# Patient Record
Sex: FEMALE | Race: BLACK OR AFRICAN AMERICAN | NOT HISPANIC OR LATINO | Employment: OTHER | URBAN - METROPOLITAN AREA
[De-identification: names, ages, dates, MRNs, and addresses within clinical notes are randomized per-mention and may not be internally consistent; named-entity substitution may affect disease eponyms.]

---

## 2023-02-22 LAB
ALANINE AMINOTRANSFERASE (SGPT) (U/L) IN SER/PLAS: 16 U/L (ref 7–45)
ALBUMIN (G/DL) IN SER/PLAS: 4.3 G/DL (ref 3.4–5)
ALKALINE PHOSPHATASE (U/L) IN SER/PLAS: 112 U/L (ref 33–136)
ANION GAP IN SER/PLAS: 13 MMOL/L (ref 10–20)
ASPARTATE AMINOTRANSFERASE (SGOT) (U/L) IN SER/PLAS: 18 U/L (ref 9–39)
BASOPHILS (10*3/UL) IN BLOOD BY AUTOMATED COUNT: 0.04 X10E9/L (ref 0–0.1)
BASOPHILS/100 LEUKOCYTES IN BLOOD BY AUTOMATED COUNT: 0.6 % (ref 0–2)
BILIRUBIN TOTAL (MG/DL) IN SER/PLAS: 0.3 MG/DL (ref 0–1.2)
CALCIUM (MG/DL) IN SER/PLAS: 9.6 MG/DL (ref 8.6–10.6)
CARBON DIOXIDE, TOTAL (MMOL/L) IN SER/PLAS: 27 MMOL/L (ref 21–32)
CHLORIDE (MMOL/L) IN SER/PLAS: 107 MMOL/L (ref 98–107)
CHOLESTEROL (MG/DL) IN SER/PLAS: 150 MG/DL (ref 0–199)
CHOLESTEROL IN HDL (MG/DL) IN SER/PLAS: 53 MG/DL
CHOLESTEROL/HDL RATIO: 2.8
CREATININE (MG/DL) IN SER/PLAS: 0.72 MG/DL (ref 0.5–1.05)
EOSINOPHILS (10*3/UL) IN BLOOD BY AUTOMATED COUNT: 0.26 X10E9/L (ref 0–0.7)
EOSINOPHILS/100 LEUKOCYTES IN BLOOD BY AUTOMATED COUNT: 4.1 % (ref 0–6)
ERYTHROCYTE DISTRIBUTION WIDTH (RATIO) BY AUTOMATED COUNT: 14.1 % (ref 11.5–14.5)
ERYTHROCYTE MEAN CORPUSCULAR HEMOGLOBIN CONCENTRATION (G/DL) BY AUTOMATED: 29.9 G/DL (ref 32–36)
ERYTHROCYTE MEAN CORPUSCULAR VOLUME (FL) BY AUTOMATED COUNT: 93 FL (ref 80–100)
ERYTHROCYTES (10*6/UL) IN BLOOD BY AUTOMATED COUNT: 4.32 X10E12/L (ref 4–5.2)
ESTIMATED AVERAGE GLUCOSE FOR HBA1C: 131 MG/DL
GFR FEMALE: >90 ML/MIN/1.73M2
GLUCOSE (MG/DL) IN SER/PLAS: 108 MG/DL (ref 74–99)
HEMATOCRIT (%) IN BLOOD BY AUTOMATED COUNT: 40.1 % (ref 36–46)
HEMOGLOBIN (G/DL) IN BLOOD: 12 G/DL (ref 12–16)
HEMOGLOBIN A1C/HEMOGLOBIN TOTAL IN BLOOD: 6.2 %
IMMATURE GRANULOCYTES/100 LEUKOCYTES IN BLOOD BY AUTOMATED COUNT: 0.2 % (ref 0–0.9)
LDL: 75 MG/DL (ref 0–99)
LEUKOCYTES (10*3/UL) IN BLOOD BY AUTOMATED COUNT: 6.3 X10E9/L (ref 4.4–11.3)
LYMPHOCYTES (10*3/UL) IN BLOOD BY AUTOMATED COUNT: 2.31 X10E9/L (ref 1.2–4.8)
LYMPHOCYTES/100 LEUKOCYTES IN BLOOD BY AUTOMATED COUNT: 36.6 % (ref 13–44)
MONOCYTES (10*3/UL) IN BLOOD BY AUTOMATED COUNT: 0.26 X10E9/L (ref 0.1–1)
MONOCYTES/100 LEUKOCYTES IN BLOOD BY AUTOMATED COUNT: 4.1 % (ref 2–10)
NEUTROPHILS (10*3/UL) IN BLOOD BY AUTOMATED COUNT: 3.43 X10E9/L (ref 1.2–7.7)
NEUTROPHILS/100 LEUKOCYTES IN BLOOD BY AUTOMATED COUNT: 54.4 % (ref 40–80)
NRBC (PER 100 WBCS) BY AUTOMATED COUNT: 0 /100 WBC (ref 0–0)
PLATELETS (10*3/UL) IN BLOOD AUTOMATED COUNT: 352 X10E9/L (ref 150–450)
POTASSIUM (MMOL/L) IN SER/PLAS: 4.4 MMOL/L (ref 3.5–5.3)
PROTEIN TOTAL: 6.9 G/DL (ref 6.4–8.2)
SODIUM (MMOL/L) IN SER/PLAS: 143 MMOL/L (ref 136–145)
THYROTROPIN (MIU/L) IN SER/PLAS BY DETECTION LIMIT <= 0.05 MIU/L: 1.41 MIU/L (ref 0.44–3.98)
TRIGLYCERIDE (MG/DL) IN SER/PLAS: 108 MG/DL (ref 0–149)
UREA NITROGEN (MG/DL) IN SER/PLAS: 13 MG/DL (ref 6–23)
VLDL: 22 MG/DL (ref 0–40)

## 2023-03-13 ENCOUNTER — TELEPHONE (OUTPATIENT)
Dept: PRIMARY CARE | Facility: CLINIC | Age: 68
End: 2023-03-13
Payer: MEDICARE

## 2023-03-13 DIAGNOSIS — R32 URINARY INCONTINENCE, UNSPECIFIED TYPE: Primary | ICD-10-CM

## 2023-03-13 RX ORDER — TROSPIUM CHLORIDE ER 60 MG/1
60 CAPSULE ORAL DAILY
Qty: 90 CAPSULE | Refills: 1 | Status: SHIPPED | OUTPATIENT
Start: 2023-03-13 | End: 2024-04-04 | Stop reason: SDUPTHER

## 2023-03-13 NOTE — TELEPHONE ENCOUNTER
----- Message from Aarti Miller sent at 3/13/2023 11:05 AM EDT -----  Regarding: Script & referral  Patient stated that she discussed with you about receiving a refill of  Tropsium at her visit and she would like this sent to [facility]. Patient also requested a referral for Urology because of excessive urination.

## 2023-03-20 ENCOUNTER — TELEPHONE (OUTPATIENT)
Dept: PRIMARY CARE | Facility: CLINIC | Age: 68
End: 2023-03-20

## 2023-03-20 PROBLEM — M25.612 DECREASED RANGE OF MOTION OF LEFT SHOULDER: Status: ACTIVE | Noted: 2023-03-20

## 2023-03-20 PROBLEM — M19.012 ARTHRITIS OF LEFT SHOULDER REGION: Status: ACTIVE | Noted: 2023-03-20

## 2023-03-20 PROBLEM — M43.10 ACQUIRED SPONDYLOLISTHESIS: Status: ACTIVE | Noted: 2023-03-20

## 2023-03-20 PROBLEM — E66.812 CLASS 2 SEVERE OBESITY WITH SERIOUS COMORBIDITY AND BODY MASS INDEX (BMI) OF 38.0 TO 38.9 IN ADULT: Status: ACTIVE | Noted: 2023-03-20

## 2023-03-20 PROBLEM — I50.9 HEART FAILURE (MULTI): Status: ACTIVE | Noted: 2023-03-20

## 2023-03-20 PROBLEM — I25.2 HISTORY OF ST ELEVATION MYOCARDIAL INFARCTION (STEMI): Status: ACTIVE | Noted: 2023-03-20

## 2023-03-20 PROBLEM — R39.9 LOWER URINARY TRACT SYMPTOMS (LUTS): Status: ACTIVE | Noted: 2023-03-20

## 2023-03-20 PROBLEM — M54.12 CERVICAL RADICULOPATHY: Status: ACTIVE | Noted: 2023-03-20

## 2023-03-20 PROBLEM — M25.519 SHOULDER PAIN: Status: ACTIVE | Noted: 2023-03-20

## 2023-03-20 PROBLEM — E83.42 HYPOMAGNESEMIA: Status: ACTIVE | Noted: 2023-03-20

## 2023-03-20 PROBLEM — M65.4 DE QUERVAIN'S TENOSYNOVITIS, RIGHT: Status: ACTIVE | Noted: 2023-03-20

## 2023-03-20 PROBLEM — Z96.652 STATUS POST LEFT KNEE REPLACEMENT: Status: ACTIVE | Noted: 2023-03-20

## 2023-03-20 PROBLEM — I73.9 PAD (PERIPHERAL ARTERY DISEASE) (CMS-HCC): Status: ACTIVE | Noted: 2023-03-20

## 2023-03-20 PROBLEM — M25.532 ARTHRALGIA OF LEFT WRIST: Status: ACTIVE | Noted: 2023-03-20

## 2023-03-20 PROBLEM — R93.1 DECREASED CARDIAC EJECTION FRACTION: Status: ACTIVE | Noted: 2023-03-20

## 2023-03-20 PROBLEM — M54.50 BACK PAIN, LUMBOSACRAL: Status: ACTIVE | Noted: 2023-03-20

## 2023-03-20 PROBLEM — G56.21 CUBITAL TUNNEL SYNDROME, RIGHT: Status: ACTIVE | Noted: 2023-03-20

## 2023-03-20 PROBLEM — M48.061 LUMBAR SPINAL STENOSIS: Status: ACTIVE | Noted: 2023-03-20

## 2023-03-20 PROBLEM — I25.5 ISCHEMIC CARDIOMYOPATHY: Status: ACTIVE | Noted: 2023-03-20

## 2023-03-20 PROBLEM — M54.16 CHRONIC LUMBAR RADICULOPATHY: Status: ACTIVE | Noted: 2023-03-20

## 2023-03-20 PROBLEM — M54.9 BACK PAIN: Status: ACTIVE | Noted: 2023-03-20

## 2023-03-20 PROBLEM — M17.12 OSTEOARTHRITIS OF LEFT KNEE: Status: ACTIVE | Noted: 2023-03-20

## 2023-03-20 PROBLEM — M79.672 LEFT FOOT PAIN: Status: ACTIVE | Noted: 2023-03-20

## 2023-03-20 PROBLEM — I10 ESSENTIAL HYPERTENSION: Status: ACTIVE | Noted: 2023-03-20

## 2023-03-20 PROBLEM — G56.01 RIGHT CARPAL TUNNEL SYNDROME: Status: ACTIVE | Noted: 2023-03-20

## 2023-03-20 PROBLEM — I47.29 VENTRICULAR TACHYCARDIA (PAROXYSMAL) (MULTI): Status: ACTIVE | Noted: 2023-03-20

## 2023-03-20 PROBLEM — R20.2 RIGHT HAND PARESTHESIA: Status: ACTIVE | Noted: 2023-03-20

## 2023-03-20 PROBLEM — M41.9 SCOLIOSIS: Status: ACTIVE | Noted: 2023-03-20

## 2023-03-20 PROBLEM — M17.11 PRIMARY OSTEOARTHRITIS OF RIGHT KNEE: Status: ACTIVE | Noted: 2023-03-20

## 2023-03-20 PROBLEM — S39.012A LOW BACK STRAIN: Status: ACTIVE | Noted: 2023-03-20

## 2023-03-20 PROBLEM — Z95.810 ICD (IMPLANTABLE CARDIOVERTER-DEFIBRILLATOR) IN PLACE: Status: ACTIVE | Noted: 2023-03-20

## 2023-03-20 PROBLEM — M51.369 DEGENERATIVE DISC DISEASE, LUMBAR: Status: ACTIVE | Noted: 2023-03-20

## 2023-03-20 PROBLEM — M19.011 ARTHRITIS OF RIGHT SHOULDER REGION: Status: ACTIVE | Noted: 2023-03-20

## 2023-03-20 PROBLEM — M47.812 CERVICAL SPONDYLOSIS: Status: ACTIVE | Noted: 2023-03-20

## 2023-03-20 PROBLEM — K59.00 CONSTIPATION: Status: ACTIVE | Noted: 2023-03-20

## 2023-03-20 PROBLEM — M51.36 DEGENERATIVE DISC DISEASE, LUMBAR: Status: ACTIVE | Noted: 2023-03-20

## 2023-03-20 PROBLEM — R63.4 WEIGHT LOSS: Status: ACTIVE | Noted: 2023-03-20

## 2023-03-20 PROBLEM — M25.511 RIGHT SHOULDER PAIN: Status: ACTIVE | Noted: 2023-03-20

## 2023-03-20 PROBLEM — E78.5 HYPERLIPIDEMIA: Status: ACTIVE | Noted: 2023-03-20

## 2023-03-20 PROBLEM — M46.1 SACROILIITIS (CMS-HCC): Status: ACTIVE | Noted: 2023-03-20

## 2023-03-20 PROBLEM — M62.81 MUSCLE WEAKNESS: Status: ACTIVE | Noted: 2023-03-20

## 2023-03-20 PROBLEM — M41.9 ACQUIRED SCOLIOSIS: Status: ACTIVE | Noted: 2023-03-20

## 2023-03-20 PROBLEM — E66.01 CLASS 2 SEVERE OBESITY WITH SERIOUS COMORBIDITY AND BODY MASS INDEX (BMI) OF 38.0 TO 38.9 IN ADULT (MULTI): Status: ACTIVE | Noted: 2023-03-20

## 2023-03-20 PROBLEM — K21.9 GERD (GASTROESOPHAGEAL REFLUX DISEASE): Status: ACTIVE | Noted: 2023-03-20

## 2023-03-20 PROBLEM — I47.20 VENTRICULAR TACHYCARDIA (PAROXYSMAL): Status: ACTIVE | Noted: 2023-03-20

## 2023-03-20 PROBLEM — R73.03 PRE-DIABETES: Status: ACTIVE | Noted: 2023-03-20

## 2023-03-20 PROBLEM — M25.512 LEFT SHOULDER PAIN: Status: ACTIVE | Noted: 2023-03-20

## 2023-03-20 PROBLEM — I25.10 CORONARY ARTERY DISEASE INVOLVING NATIVE CORONARY ARTERY OF NATIVE HEART WITHOUT ANGINA PECTORIS: Status: ACTIVE | Noted: 2023-03-20

## 2023-03-20 PROBLEM — M54.50 BILATERAL LOW BACK PAIN: Status: ACTIVE | Noted: 2023-03-20

## 2023-03-20 PROBLEM — M40.05 POSTURAL KYPHOSIS OF THORACOLUMBAR REGION: Status: ACTIVE | Noted: 2023-03-20

## 2023-03-20 RX ORDER — AMLODIPINE BESYLATE 10 MG/1
1 TABLET ORAL DAILY
COMMUNITY
Start: 2017-06-24 | End: 2023-08-11

## 2023-03-20 RX ORDER — GABAPENTIN 600 MG/1
2 TABLET ORAL 3 TIMES DAILY
COMMUNITY
Start: 2019-07-22

## 2023-03-20 RX ORDER — OMEPRAZOLE 20 MG/1
1 CAPSULE, DELAYED RELEASE ORAL DAILY
COMMUNITY
Start: 2015-04-28 | End: 2023-04-04

## 2023-03-20 RX ORDER — FLUTICASONE PROPIONATE 50 MCG
2 SPRAY, SUSPENSION (ML) NASAL DAILY
COMMUNITY
Start: 2017-05-02 | End: 2023-12-18 | Stop reason: SDUPTHER

## 2023-03-20 RX ORDER — CARVEDILOL 25 MG/1
1 TABLET ORAL 2 TIMES DAILY
COMMUNITY
Start: 2018-07-18 | End: 2023-05-05

## 2023-03-20 RX ORDER — LANOLIN ALCOHOL/MO/W.PET/CERES
1 CREAM (GRAM) TOPICAL DAILY
COMMUNITY
Start: 2021-06-27 | End: 2023-08-08 | Stop reason: SDUPTHER

## 2023-03-20 RX ORDER — ATORVASTATIN CALCIUM 80 MG/1
1 TABLET, FILM COATED ORAL NIGHTLY
COMMUNITY
Start: 2018-11-26 | End: 2023-04-19

## 2023-03-20 RX ORDER — ASPIRIN 81 MG/1
1 TABLET ORAL DAILY
COMMUNITY
Start: 2017-05-19

## 2023-03-20 RX ORDER — DAPAGLIFLOZIN 10 MG/1
1 TABLET, FILM COATED ORAL EVERY MORNING
COMMUNITY
End: 2023-10-13

## 2023-03-20 RX ORDER — DICLOFENAC SODIUM 10 MG/G
2 GEL TOPICAL 4 TIMES DAILY
COMMUNITY
End: 2024-02-07 | Stop reason: SDUPTHER

## 2023-03-20 RX ORDER — OXYBUTYNIN CHLORIDE 5 MG/1
1 TABLET, EXTENDED RELEASE ORAL DAILY
COMMUNITY
Start: 2023-01-12 | End: 2024-02-07 | Stop reason: WASHOUT

## 2023-03-20 RX ORDER — FUROSEMIDE 20 MG/1
1 TABLET ORAL DAILY
COMMUNITY
Start: 2021-01-22 | End: 2023-10-02 | Stop reason: SDUPTHER

## 2023-03-22 ENCOUNTER — OFFICE VISIT (OUTPATIENT)
Dept: PRIMARY CARE | Facility: CLINIC | Age: 68
End: 2023-03-22
Payer: MEDICARE

## 2023-03-22 VITALS
HEART RATE: 80 BPM | TEMPERATURE: 97 F | DIASTOLIC BLOOD PRESSURE: 80 MMHG | HEIGHT: 68 IN | SYSTOLIC BLOOD PRESSURE: 138 MMHG | BODY MASS INDEX: 37.25 KG/M2 | RESPIRATION RATE: 19 BRPM | OXYGEN SATURATION: 97 % | WEIGHT: 245.8 LBS

## 2023-03-22 DIAGNOSIS — R22.9 SUBCUTANEOUS MASS: Primary | ICD-10-CM

## 2023-03-22 DIAGNOSIS — R10.9 FLANK PAIN: ICD-10-CM

## 2023-03-22 PROBLEM — N17.9 AKI (ACUTE KIDNEY INJURY) (CMS-HCC): Status: ACTIVE | Noted: 2023-03-22

## 2023-03-22 PROCEDURE — 1160F RVW MEDS BY RX/DR IN RCRD: CPT | Performed by: FAMILY MEDICINE

## 2023-03-22 PROCEDURE — 3079F DIAST BP 80-89 MM HG: CPT | Performed by: FAMILY MEDICINE

## 2023-03-22 PROCEDURE — 99213 OFFICE O/P EST LOW 20 MIN: CPT | Performed by: FAMILY MEDICINE

## 2023-03-22 PROCEDURE — 1036F TOBACCO NON-USER: CPT | Performed by: FAMILY MEDICINE

## 2023-03-22 PROCEDURE — 3075F SYST BP GE 130 - 139MM HG: CPT | Performed by: FAMILY MEDICINE

## 2023-03-22 PROCEDURE — 1159F MED LIST DOCD IN RCRD: CPT | Performed by: FAMILY MEDICINE

## 2023-03-22 RX ORDER — TRAMADOL HYDROCHLORIDE 50 MG/1
TABLET ORAL
COMMUNITY
Start: 2009-09-21 | End: 2023-03-22 | Stop reason: SINTOL

## 2023-03-22 ASSESSMENT — PATIENT HEALTH QUESTIONNAIRE - PHQ9
SUM OF ALL RESPONSES TO PHQ9 QUESTIONS 1 AND 2: 0
2. FEELING DOWN, DEPRESSED OR HOPELESS: NOT AT ALL
1. LITTLE INTEREST OR PLEASURE IN DOING THINGS: NOT AT ALL

## 2023-03-22 NOTE — PROGRESS NOTES
"Subjective   Patient ID: Jerri Ramirez is a 67 y.o. female who presents for Back Pain (Pt complains of left side pain).    HPI     Health Maintenance:  - Colonoscopy:- 11/15/22- repeat 3-5 yrs  - Mammogram:8/30/22  - PAP: n/a  -Bone density DEXA:- due- ordered   COVID vaccination status - completed   Due for shingrix/ tdap - can get from local pharm      HTN  BP at goal today in office.  Using medications without issues.  Denies CP, SOB, palpitations, change in vision, dizziness, N/V.     Has subcutaneous mass resembling lipoma in the upper back.  States the area is causing issue where her ICD defibrillator is in place.  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 , Living with her grandson  :  -   Kids: 2     Review of Systems    All systems reviewed and neg if not noted in the HPI above       Objective   /80 (BP Location: Right arm, Patient Position: Sitting, BP Cuff Size: Adult)   Pulse 80   Temp 36.1 °C (97 °F)   Resp 19   Ht 1.715 m (5' 7.5\")   Wt 111 kg (245 lb 12.8 oz)   SpO2 97%   BMI 37.93 kg/m²     Physical Exam  TTP at the left flank with subcut mass    Assessment/Plan   Problem List Items Addressed This Visit    None  Visit Diagnoses       Subcutaneous mass    -  Primary    Relevant Orders    Referral to General Surgery    Flank pain                   "

## 2023-04-04 ENCOUNTER — HOSPITAL ENCOUNTER (OUTPATIENT)
Dept: DATA CONVERSION | Facility: HOSPITAL | Age: 68
End: 2023-04-04
Attending: SURGERY | Admitting: SURGERY
Payer: MEDICARE

## 2023-04-04 DIAGNOSIS — E78.5 HYPERLIPIDEMIA, UNSPECIFIED: ICD-10-CM

## 2023-04-04 DIAGNOSIS — D64.9 ANEMIA, UNSPECIFIED: ICD-10-CM

## 2023-04-04 DIAGNOSIS — I25.2 OLD MYOCARDIAL INFARCTION: ICD-10-CM

## 2023-04-04 DIAGNOSIS — Z79.82 LONG TERM (CURRENT) USE OF ASPIRIN: ICD-10-CM

## 2023-04-04 DIAGNOSIS — R22.2 LOCALIZED SWELLING, MASS AND LUMP, TRUNK: ICD-10-CM

## 2023-04-04 DIAGNOSIS — M79.9 SOFT TISSUE DISORDER, UNSPECIFIED: ICD-10-CM

## 2023-04-17 DIAGNOSIS — I25.10 CORONARY ARTERY DISEASE INVOLVING NATIVE CORONARY ARTERY OF NATIVE HEART WITHOUT ANGINA PECTORIS: ICD-10-CM

## 2023-04-17 DIAGNOSIS — I73.9 PAD (PERIPHERAL ARTERY DISEASE) (CMS-HCC): Primary | ICD-10-CM

## 2023-04-17 DIAGNOSIS — E78.5 HYPERLIPIDEMIA, UNSPECIFIED HYPERLIPIDEMIA TYPE: ICD-10-CM

## 2023-04-17 NOTE — TELEPHONE ENCOUNTER
----- Message from Trina Floyd DO sent at 4/17/2023  5:29 AM EDT -----  Regarding: RE: Referral for MRI  Please call patient have her call her orthopedic shoulder specialist regarding further imaging for her worsen shoulder pain  ----- Message -----  From: Aarti Miller  Sent: 4/12/2023  11:14 AM EDT  To: Trina Floyd DO  Subject: Referral for MRI                                 Patient called requesting an referral for MRI she stated that she continues to have pain in her shoulder blade area.

## 2023-04-17 NOTE — TELEPHONE ENCOUNTER
----- Message from Trina Floyd DO sent at 4/17/2023 12:19 PM EDT -----  Regarding: RE: Referral for MRI  Needs follow up for further imaging  ----- Message -----  From: Aarti Miller  Sent: 4/17/2023   9:52 AM EDT  To: Trina Floyd DO  Subject: RE: Referral for MRI                             Spoke with patient she stated that her pain is not associated with a bone she stated that it is an issue with her back and that is the reason she's requesting a MRI.  ----- Message -----  From: Trina Floyd DO  Sent: 4/17/2023   5:29 AM EDT  To: Aarti Miller  Subject: RE: Referral for MRI                             Please call patient have her call her orthopedic shoulder specialist regarding further imaging for her worsen shoulder pain  ----- Message -----  From: Aarti Miller  Sent: 4/12/2023  11:14 AM EDT  To: Trina Floyd DO  Subject: Referral for MRI                                 Patient called requesting an referral for MRI she stated that she continues to have pain in her shoulder blade area.

## 2023-04-18 LAB
APPEARANCE, URINE: CLEAR
BACTERIA, URINE: ABNORMAL /HPF
BILIRUBIN, URINE: NEGATIVE
BLOOD, URINE: NEGATIVE
COLOR, URINE: ABNORMAL
GLUCOSE, URINE: ABNORMAL MG/DL
KETONES, URINE: NEGATIVE MG/DL
LEUKOCYTE ESTERASE, URINE: ABNORMAL
NITRITE, URINE: NEGATIVE
PH, URINE: 8 (ref 5–8)
PROTEIN, URINE: NEGATIVE MG/DL
RBC, URINE: <1 /HPF (ref 0–5)
SPECIFIC GRAVITY, URINE: 1.01 (ref 1–1.03)
SQUAMOUS EPITHELIAL CELLS, URINE: <1 /HPF
UROBILINOGEN, URINE: <2 MG/DL (ref 0–1.9)
WBC, URINE: 13 /HPF (ref 0–5)

## 2023-04-19 RX ORDER — ATORVASTATIN CALCIUM 80 MG/1
TABLET, FILM COATED ORAL
Qty: 90 TABLET | Refills: 3 | Status: SHIPPED | OUTPATIENT
Start: 2023-04-19 | End: 2024-04-15

## 2023-04-20 LAB — URINE CULTURE: ABNORMAL

## 2023-04-24 LAB
COMPLETE PATHOLOGY REPORT: NORMAL
CONVERTED CLINICAL DIAGNOSIS-HISTORY: NORMAL
CONVERTED FINAL DIAGNOSIS: NORMAL
CONVERTED FINAL REPORT PDF LINK TO COPY AND PASTE: NORMAL
CONVERTED GROSS DESCRIPTION: NORMAL

## 2023-05-04 DIAGNOSIS — Z95.810 ICD (IMPLANTABLE CARDIOVERTER-DEFIBRILLATOR) IN PLACE: ICD-10-CM

## 2023-05-04 DIAGNOSIS — I50.9 HEART FAILURE, UNSPECIFIED HF CHRONICITY, UNSPECIFIED HEART FAILURE TYPE (MULTI): Primary | ICD-10-CM

## 2023-05-04 DIAGNOSIS — R93.1 DECREASED CARDIAC EJECTION FRACTION: ICD-10-CM

## 2023-05-05 RX ORDER — CARVEDILOL 25 MG/1
TABLET ORAL
Qty: 180 TABLET | Refills: 3 | Status: SHIPPED | OUTPATIENT
Start: 2023-05-05 | End: 2024-05-01

## 2023-05-08 LAB
APPEARANCE, URINE: CLEAR
BILIRUBIN, URINE: NEGATIVE
BLOOD, URINE: NEGATIVE
COLOR, URINE: ABNORMAL
GLUCOSE, URINE: ABNORMAL MG/DL
KETONES, URINE: NEGATIVE MG/DL
LEUKOCYTE ESTERASE, URINE: NEGATIVE
NITRITE, URINE: NEGATIVE
PH, URINE: 7 (ref 5–8)
PROTEIN, URINE: NEGATIVE MG/DL
SPECIFIC GRAVITY, URINE: 1.01 (ref 1–1.03)
UROBILINOGEN, URINE: <2 MG/DL (ref 0–1.9)

## 2023-05-12 LAB — URINE CULTURE: ABNORMAL

## 2023-05-16 DIAGNOSIS — R09.89 CHEST CONGESTION: Primary | ICD-10-CM

## 2023-05-22 ENCOUNTER — APPOINTMENT (OUTPATIENT)
Dept: PRIMARY CARE | Facility: CLINIC | Age: 68
End: 2023-05-22
Payer: MEDICARE

## 2023-05-23 RX ORDER — ALBUTEROL SULFATE 90 UG/1
2 AEROSOL, METERED RESPIRATORY (INHALATION) EVERY 4 HOURS PRN
Qty: 8 G | Refills: 0 | Status: SHIPPED | OUTPATIENT
Start: 2023-05-23 | End: 2024-04-08

## 2023-05-23 NOTE — TELEPHONE ENCOUNTER
Pls cll pt  Needs follow-up regarding medication refill.  She has requested this medication refill 4 times in the last year which is indicating that she might have some underlying pulmonary issue that needs further evaluation.    If she is feeling more shortness of breath please have her schedule a sooner appointment with me then August

## 2023-07-03 DIAGNOSIS — K21.9 GASTROESOPHAGEAL REFLUX DISEASE, UNSPECIFIED WHETHER ESOPHAGITIS PRESENT: ICD-10-CM

## 2023-07-05 RX ORDER — OMEPRAZOLE 20 MG/1
CAPSULE, DELAYED RELEASE ORAL
Qty: 90 CAPSULE | Refills: 0 | Status: SHIPPED | OUTPATIENT
Start: 2023-07-05 | End: 2023-10-07

## 2023-08-02 ENCOUNTER — TELEPHONE (OUTPATIENT)
Dept: PRIMARY CARE | Facility: CLINIC | Age: 68
End: 2023-08-02
Payer: MEDICARE

## 2023-08-02 DIAGNOSIS — K59.00 CONSTIPATION, UNSPECIFIED CONSTIPATION TYPE: Primary | ICD-10-CM

## 2023-08-02 DIAGNOSIS — I10 ESSENTIAL HYPERTENSION: ICD-10-CM

## 2023-08-02 DIAGNOSIS — E83.42 HYPOMAGNESEMIA: ICD-10-CM

## 2023-08-10 NOTE — TELEPHONE ENCOUNTER
Patient requested a medication refill of the following pended medications:  AmLODIPine (Norvasc) 10 mg tablet   Magnesium oxide (Mag-Ox) 400 mg (241.3 mg magnesium) tablet     If approved , Please send to pharmacy listed on pended medications

## 2023-08-11 RX ORDER — AMLODIPINE BESYLATE 10 MG/1
10 TABLET ORAL DAILY
Qty: 90 TABLET | Refills: 3 | Status: SHIPPED | OUTPATIENT
Start: 2023-08-11

## 2023-08-11 RX ORDER — LANOLIN ALCOHOL/MO/W.PET/CERES
1 CREAM (GRAM) TOPICAL DAILY
Qty: 90 TABLET | Refills: 3 | Status: SHIPPED | OUTPATIENT
Start: 2023-08-11

## 2023-08-25 ENCOUNTER — OFFICE VISIT (OUTPATIENT)
Dept: PRIMARY CARE | Facility: CLINIC | Age: 68
End: 2023-08-25
Payer: MEDICARE

## 2023-08-25 VITALS
TEMPERATURE: 97 F | BODY MASS INDEX: 37.83 KG/M2 | HEART RATE: 87 BPM | WEIGHT: 241 LBS | DIASTOLIC BLOOD PRESSURE: 78 MMHG | SYSTOLIC BLOOD PRESSURE: 132 MMHG | HEIGHT: 67 IN | OXYGEN SATURATION: 98 % | RESPIRATION RATE: 14 BRPM

## 2023-08-25 DIAGNOSIS — M54.50 BACK PAIN, LUMBOSACRAL: Primary | ICD-10-CM

## 2023-08-25 DIAGNOSIS — I47.29 VENTRICULAR TACHYCARDIA (PAROXYSMAL) (MULTI): ICD-10-CM

## 2023-08-25 DIAGNOSIS — I73.9 PAD (PERIPHERAL ARTERY DISEASE) (CMS-HCC): ICD-10-CM

## 2023-08-25 DIAGNOSIS — E66.01 CLASS 2 SEVERE OBESITY WITH SERIOUS COMORBIDITY AND BODY MASS INDEX (BMI) OF 38.0 TO 38.9 IN ADULT, UNSPECIFIED OBESITY TYPE (MULTI): ICD-10-CM

## 2023-08-25 DIAGNOSIS — I50.9 HEART FAILURE, UNSPECIFIED HF CHRONICITY, UNSPECIFIED HEART FAILURE TYPE (MULTI): ICD-10-CM

## 2023-08-25 PROBLEM — N39.0 ACUTE UTI: Status: ACTIVE | Noted: 2023-08-25

## 2023-08-25 PROBLEM — M96.1 LUMBAR POST-LAMINECTOMY SYNDROME: Status: ACTIVE | Noted: 2023-08-25

## 2023-08-25 PROBLEM — M47.816 ARTHRITIS OF LUMBAR SPINE: Status: ACTIVE | Noted: 2023-08-25

## 2023-08-25 PROBLEM — M46.1 SACROILIITIS (CMS-HCC): Status: RESOLVED | Noted: 2023-03-20 | Resolved: 2023-08-25

## 2023-08-25 PROCEDURE — 1160F RVW MEDS BY RX/DR IN RCRD: CPT | Performed by: FAMILY MEDICINE

## 2023-08-25 PROCEDURE — 1159F MED LIST DOCD IN RCRD: CPT | Performed by: FAMILY MEDICINE

## 2023-08-25 PROCEDURE — 3078F DIAST BP <80 MM HG: CPT | Performed by: FAMILY MEDICINE

## 2023-08-25 PROCEDURE — 1126F AMNT PAIN NOTED NONE PRSNT: CPT | Performed by: FAMILY MEDICINE

## 2023-08-25 PROCEDURE — 99213 OFFICE O/P EST LOW 20 MIN: CPT | Performed by: FAMILY MEDICINE

## 2023-08-25 PROCEDURE — 3075F SYST BP GE 130 - 139MM HG: CPT | Performed by: FAMILY MEDICINE

## 2023-08-25 PROCEDURE — 1036F TOBACCO NON-USER: CPT | Performed by: FAMILY MEDICINE

## 2023-08-25 PROCEDURE — 3008F BODY MASS INDEX DOCD: CPT | Performed by: FAMILY MEDICINE

## 2023-08-25 RX ORDER — HYDROCODONE BITARTRATE AND ACETAMINOPHEN 5; 325 MG/1; MG/1
TABLET ORAL
COMMUNITY
Start: 2023-04-04 | End: 2023-08-25 | Stop reason: ALTCHOICE

## 2023-08-25 RX ORDER — TROSPIUM CHLORIDE 20 MG/1
20 TABLET, FILM COATED ORAL EVERY 12 HOURS
COMMUNITY
End: 2024-02-07 | Stop reason: WASHOUT

## 2023-08-25 ASSESSMENT — PATIENT HEALTH QUESTIONNAIRE - PHQ9
1. LITTLE INTEREST OR PLEASURE IN DOING THINGS: NOT AT ALL
SUM OF ALL RESPONSES TO PHQ9 QUESTIONS 1 AND 2: 0
2. FEELING DOWN, DEPRESSED OR HOPELESS: NOT AT ALL

## 2023-08-25 ASSESSMENT — LIFESTYLE VARIABLES: HOW MANY STANDARD DRINKS CONTAINING ALCOHOL DO YOU HAVE ON A TYPICAL DAY: PATIENT DOES NOT DRINK

## 2023-08-25 NOTE — PROGRESS NOTES
"Subjective   Patient ID: Jerri Ramirez is a 68 y.o. female who presents for Follow-up (Pt is here for arthritis fuv.).    HPI   Has been with back pain after falling backwards in the tub- head hit the shower during power outage  Since then her back was been doing better  Followed by ortho back- recommended pain management for injections- used in the past- which helped  Was with back surgery in the past  Denies loss of control of bowel or bladder, saddle paresthesia, leg weakness or leg paresthesia    Review of Systems  All systems reviewed and neg if not noted in the HPI above     Objective   /78 (Patient Position: Sitting)   Pulse 87   Temp 36.1 °C (97 °F)   Resp 14   Ht 1.702 m (5' 7\")   Wt 109 kg (241 lb)   SpO2 98%   BMI 37.75 kg/m²     Physical Exam  Pleasant  Eyes: conjunctiva non-icteric and eye lids are without obvious rash or drooping. Pupils are symmetric.   Ears, Nose, Mouth, and Throat: External ears and nose appear to be without deformity or rash. No lesions or masses noted. Hearing is grossly intact.   CV: RRR, no murmur  Pulm:CTA B/L  LE: no edema  Psychiatric: Alert, orientation to person, place, and time. Recent/remote memory as evidenced through face-to-face interaction and discussion appear grossly intact. Mood and affect are normal.      Patient appears to be in no pain, non-antalgic gait noted.   paraspinal muscles TTP at L4  No masses.     Straight leg raise is neg  Peripheral pulses are palpable.      Assessment/Plan   Problem List Items Addressed This Visit       Back pain, lumbosacral - Primary  - better  -continue to monitor    Class 2 severe obesity with serious comorbidity and body mass index (BMI) of 38.0 to 38.9 in adult (CMS/HCC)  -lost some wt  Continue to work on healthy diet and exercise  We encourage all patients to engage in exercise 150 minutes per week   Adults 18 and older, activity during each week - if you are able:    Engage in at least 150 minutes of moderate " or vigorous exercise per week   If you are able- Engage in muscle strengthening activity on 2 or more days per week      Heart failure (CMS/HCC)  - stbale  -continue with meds    PAD (peripheral artery disease) (CMS/HCC)  - stbale  -continue with meds    Ventricular tachycardia (paroxysmal) (CMS/HCC)     Has been quiescent  Will continue to monitor             Patient was identified as a fall risk. Risk prevention instructions provided.      Please follow up in FEB 2024 for medicare wellness  or as needed.

## 2023-08-25 NOTE — PATIENT INSTRUCTIONS
Back pain  - over all better  - continue to monitor  -continue current meds  - follow up with pain management if needing repeat injections      Please follow up in FEB 2024 for medicare wellness  or as needed.       ** If labs or imaging ordered at today's visit, all the non-urgent results will be discussed at your next visit    If you have been referred for a special test or to a specialist please call  [phone] to schedule an appointment.  If you have any further questions, or if develop new or worsened symptoms, please give our office a call at [phone].         Ways to Help Prevent Falls at Home    Quick Tips   ? Ask for help if you need it. Most people want to help!   ? Get up slowly after sitting or laying down   ? Wear a medical alert device or keep cell phone in your pocket   ? Use night lights, especially areas near a bathroom   ? Keep the items you use often within reach on a small stool or end table   ? Use an assistive device such as walker or cane, as directed by provider/physical therapy   ? Use a non-slip mat and grab bars in your bathroom. Look for home health sections for best options     Other Areas to Focus On   ? Exercise and nutrition: Regular exercise or taking a falls prevention class are great ways improve strength and balance. Don’t forget to stay hydrated and bring a snack!   ? Medicine side effects: Some medicines can make you sleepy or dizzy, which could cause a fall. Ask your healthcare provider about the side effects your medicines could cause. Be sure to let them know if you take any vitamins or supplements as well.   ? Tripping hazards: Remove items you could trip on, such as loose mats, rugs, cords, and clutter. Wear closed toe shoes with rubber soles.   ? Health and wellness: Get regular checkups with your healthcare provider, plus routine vision and hearing screenings. Talk with your healthcare provider about:   o Your medicines and the possible side effects - bring  them in a bag if that is easier!   o Problems with balance or feeling dizzy   o Ways to promote bone health, such as Vitamin D and calcium supplements   o Questions or concerns about falling     *Ask your healthcare team if you have questions     ©[facility], 2022

## 2023-09-06 VITALS — BODY MASS INDEX: 40.42 KG/M2 | WEIGHT: 257.5 LBS | HEIGHT: 67 IN

## 2023-09-14 NOTE — H&P
History & Physical Reviewed:   I have reviewed the History and Physical dated:  29-Mar-2023   History and Physical reviewed and relevant findings noted. Patient examined to review pertinent physical  findings.: No significant changes   Home Medications Reviewed: no changes noted   Allergies Reviewed: no changes noted       ERAS (Enhanced Recovery After Surgery):  ·  ERAS Patient: no     Consent:   COVID-19 Consent:  ·  COVID-19 Risk Consent Surgeon has reviewed key risks related to the risk of baljeet COVID-19 and if they contract COVID-19 what the risks are.       Electronic Signatures:  Cheng Dickinson)  (Signed 04-Apr-2023 08:51)   Authored: History & Physical Reviewed, ERAS, Consent,  Note Completion      Last Updated: 04-Apr-2023 08:51 by Cheng Dickinson)

## 2023-10-02 DIAGNOSIS — I50.9 HEART FAILURE, UNSPECIFIED HF CHRONICITY, UNSPECIFIED HEART FAILURE TYPE (MULTI): Primary | ICD-10-CM

## 2023-10-02 DIAGNOSIS — K21.9 GASTROESOPHAGEAL REFLUX DISEASE, UNSPECIFIED WHETHER ESOPHAGITIS PRESENT: ICD-10-CM

## 2023-10-02 NOTE — OP NOTE
Post Operative Note:     Post-Procedure Diagnosis: Left mid back subcutaneous  lipoma   Procedure: 1.  Excision of lipoma  2.   3.   4.   5.   Surgeon: Dr. Dickinson   Resident/Fellow/Other Assistant: None   Estimated Blood Loss (mL): none   Specimen: yes   Findings: 10 x 7 cm diameter lipoma     Attestation:   Note Completion:  Attending Attestation I performed the procedure without a resident         Electronic Signatures:  Cheng Dickinson)  (Signed 04-Apr-2023 10:08)   Authored: Post Operative Note, Note Completion      Last Updated: 04-Apr-2023 10:08 by Cheng Dickinson)

## 2023-10-06 DIAGNOSIS — I47.20 VT (VENTRICULAR TACHYCARDIA) (MULTI): Primary | ICD-10-CM

## 2023-10-06 DIAGNOSIS — Z95.810 PRESENCE OF AUTOMATIC CARDIOVERTER/DEFIBRILLATOR (AICD): ICD-10-CM

## 2023-10-07 RX ORDER — OMEPRAZOLE 20 MG/1
CAPSULE, DELAYED RELEASE ORAL
Qty: 90 CAPSULE | Refills: 0 | Status: SHIPPED | OUTPATIENT
Start: 2023-10-07 | End: 2023-12-28

## 2023-10-07 RX ORDER — FUROSEMIDE 20 MG/1
20 TABLET ORAL DAILY
Qty: 90 TABLET | Refills: 3 | Status: SHIPPED | OUTPATIENT
Start: 2023-10-07 | End: 2023-12-18 | Stop reason: SDUPTHER

## 2023-10-09 ENCOUNTER — APPOINTMENT (OUTPATIENT)
Dept: CARDIOLOGY | Facility: CLINIC | Age: 68
End: 2023-10-09
Payer: MEDICARE

## 2023-10-10 ENCOUNTER — HOSPITAL ENCOUNTER (OUTPATIENT)
Dept: CARDIOLOGY | Facility: CLINIC | Age: 68
Discharge: HOME | End: 2023-10-10
Payer: MEDICARE

## 2023-10-10 DIAGNOSIS — I47.20 VT (VENTRICULAR TACHYCARDIA) (MULTI): ICD-10-CM

## 2023-10-10 DIAGNOSIS — Z95.810 PRESENCE OF AUTOMATIC CARDIOVERTER/DEFIBRILLATOR (AICD): ICD-10-CM

## 2023-10-10 PROCEDURE — 93296 REM INTERROG EVL PM/IDS: CPT

## 2023-10-10 PROCEDURE — 93295 DEV INTERROG REMOTE 1/2/MLT: CPT | Performed by: INTERNAL MEDICINE

## 2023-10-13 DIAGNOSIS — R93.1 DECREASED CARDIAC EJECTION FRACTION: Primary | ICD-10-CM

## 2023-10-13 RX ORDER — DAPAGLIFLOZIN 10 MG/1
10 TABLET, FILM COATED ORAL
Qty: 90 TABLET | Refills: 11 | Status: SHIPPED | OUTPATIENT
Start: 2023-10-13 | End: 2024-01-30

## 2023-11-28 ENCOUNTER — APPOINTMENT (OUTPATIENT)
Dept: RADIOLOGY | Facility: HOSPITAL | Age: 68
End: 2023-11-28
Payer: MEDICARE

## 2023-11-28 ENCOUNTER — APPOINTMENT (OUTPATIENT)
Dept: ORTHOPEDIC SURGERY | Facility: HOSPITAL | Age: 68
End: 2023-11-28
Payer: MEDICARE

## 2023-12-18 DIAGNOSIS — I50.9 HEART FAILURE, UNSPECIFIED HF CHRONICITY, UNSPECIFIED HEART FAILURE TYPE (MULTI): ICD-10-CM

## 2023-12-18 DIAGNOSIS — R09.81 NASAL CONGESTION: ICD-10-CM

## 2023-12-18 RX ORDER — FLUTICASONE PROPIONATE 50 MCG
2 SPRAY, SUSPENSION (ML) NASAL DAILY
Qty: 16 G | Refills: 3 | Status: SHIPPED | OUTPATIENT
Start: 2023-12-18

## 2023-12-18 RX ORDER — FUROSEMIDE 20 MG/1
20 TABLET ORAL DAILY
Qty: 90 TABLET | Refills: 3 | Status: SHIPPED | OUTPATIENT
Start: 2023-12-18

## 2023-12-28 DIAGNOSIS — K21.9 GASTROESOPHAGEAL REFLUX DISEASE, UNSPECIFIED WHETHER ESOPHAGITIS PRESENT: ICD-10-CM

## 2023-12-28 RX ORDER — OMEPRAZOLE 20 MG/1
20 CAPSULE, DELAYED RELEASE ORAL
Qty: 90 CAPSULE | Refills: 0 | Status: SHIPPED | OUTPATIENT
Start: 2023-12-28 | End: 2024-03-27

## 2024-01-12 ENCOUNTER — APPOINTMENT (OUTPATIENT)
Dept: ORTHOPEDIC SURGERY | Facility: HOSPITAL | Age: 69
End: 2024-01-12
Payer: MEDICARE

## 2024-01-16 ENCOUNTER — TELEPHONE (OUTPATIENT)
Dept: CARDIOLOGY | Facility: CLINIC | Age: 69
End: 2024-01-16
Payer: MEDICARE

## 2024-01-16 NOTE — TELEPHONE ENCOUNTER
Attempted call to both phone numbers listed in pts chart, unable to contact and cannot leave VM at this time. Please try again later.

## 2024-01-16 NOTE — TELEPHONE ENCOUNTER
Copied from CRM #[id number]. Topic: Information Request - Prescription Refill FAQ  >> Jan 16, 2024 11:41 AM Kofi JAMESON wrote:  Mrs. Ramirez is trying to reach Dr. Lerner office she's concerned about her farxiga (10 mg) medication.

## 2024-01-19 NOTE — TELEPHONE ENCOUNTER
"Called and spoke with pt, pt states the cost of her Farxiga is $500 for 30day supply, and she has not met her deductible. Pt states she called her insurance who told her that we can send a \"letter\" to insurance that will decrease the cost of medication to roughly $85/month.   I told the patient we will need more detail/specifics on what kind of paperwork or program the insurance is referring to. Pt states she will call her insurance back to get more information and then call us back on Monday   "

## 2024-01-22 NOTE — PROGRESS NOTES
Subjective    Patient ID: Denae Ramirez is a 68 y.o. female.    Chief Complaint: No chief complaint on file.     Last Surgery: L RTSR  Last Surgery Date: 07/14/21    DENAE RAMIREZ is a pleasant 67 year old female who returns to clinic for a repeat postoperative visit. She is status post left reverse total shoulder replacement on 7/14/21.      She states she is doing extremely well. She denies having any pain and states she has been performing her stretches and exercises daily.     Part of [facility] research study formal therapy group. X-rays and evaluation forms filled out today.    01/23/24  Denae returns to the clinic for a follow up visit regarding her left shoulder. She is part of the [facility] research study.      Past medical history, surgical history, social history, and family history were all reviewed and are as per the [place] patient health history questionnaire form that I signed and scanned into the chart today.          Objective   Ortho Exam  Patient is a well-developed, well-nourished female in no acute distress.  Breathes with normal chest rises.  Pupils are round and symmetric today.  Awake, alert, and oriented x3.      Examination of the left shoulder today reveals the skin to be intact. There is no sign of any atrophy, lesions, or abrasions. There is no pain to palpation of the bony prominences. Cervical lymphadenopathy examined, and this was negative. Patient had 5 out of 5 wrist flexion, extension, and thumb extension bilaterally. Sensation was intact to light touch to median, ulnar, radial axillary, and musculocutaneous nerves bilaterally. Positive radial pulse bilaterally. Provocative maneuvers on the left side today were negative.  Range of motion of the left shoulder revealed 0-130° of forward elevation, 0-30° of external rotation, and internal rotation was to L-4.     Examination of the right shoulder today reveals the skin to be intact. There is no sign of any atrophy, lesions, or abrasions. There is  no pain to palpation of the bony prominences. Cervical lymphadenopathy examined, and this was negative. Patient had 5 out of 5 wrist flexion, extension, and thumb extension, bilaterally. Sensation was intact to light touch to median, ulnar, radial, axillary, and musculocutaneous nerves bilaterally. Positive radial pulse bilaterally. Provocative maneuvers on the right side today were negative.  Range of motion of the right shoulder revealed 0-170° of forward elevation, 0-60° of external rotation, and internal rotation up to T-12.     Image Results:  01/23/24 X-rays personally ordered and interpreted by me show no signs of any fracture, dislocation, arthritis or proximal humerus migration.          Assessment/Plan   Encounter Diagnoses:  No diagnosis found.  Jerri is 2+ years s/p left reverse total shoulder arthroplasty     She is doing well. We will see her in a couple of years for her follow up.  No orders of the defined types were placed in this encounter.    Follow up as needed    Scribe Attestation  By signing my name below, IAmparo , Scribdeepa   attest that this documentation has been prepared under the direction and in the presence of Yann Roy MD.

## 2024-01-22 NOTE — TELEPHONE ENCOUNTER
Pt called stating she called her pharmacy and they need a prior authorization. Initiated request but authorization not needed. Drug is covered by current benefit plan.

## 2024-01-23 ENCOUNTER — HOSPITAL ENCOUNTER (OUTPATIENT)
Dept: RADIOLOGY | Facility: HOSPITAL | Age: 69
Discharge: HOME | End: 2024-01-23
Payer: MEDICARE

## 2024-01-23 ENCOUNTER — OFFICE VISIT (OUTPATIENT)
Dept: ORTHOPEDIC SURGERY | Facility: HOSPITAL | Age: 69
End: 2024-01-23
Payer: MEDICARE

## 2024-01-23 VITALS — BODY MASS INDEX: 41.65 KG/M2 | HEIGHT: 65 IN | WEIGHT: 250 LBS

## 2024-01-23 DIAGNOSIS — Z96.611 STATUS POST REVERSE TOTAL SHOULDER REPLACEMENT, RIGHT: ICD-10-CM

## 2024-01-23 DIAGNOSIS — Z96.611 STATUS POST REVERSE TOTAL SHOULDER REPLACEMENT, RIGHT: Primary | ICD-10-CM

## 2024-01-23 PROCEDURE — 1159F MED LIST DOCD IN RCRD: CPT | Performed by: ORTHOPAEDIC SURGERY

## 2024-01-23 PROCEDURE — 3008F BODY MASS INDEX DOCD: CPT | Performed by: ORTHOPAEDIC SURGERY

## 2024-01-23 PROCEDURE — 1036F TOBACCO NON-USER: CPT | Performed by: ORTHOPAEDIC SURGERY

## 2024-01-23 PROCEDURE — 99213 OFFICE O/P EST LOW 20 MIN: CPT | Performed by: ORTHOPAEDIC SURGERY

## 2024-01-23 PROCEDURE — 73030 X-RAY EXAM OF SHOULDER: CPT | Mod: LT

## 2024-01-23 PROCEDURE — 1126F AMNT PAIN NOTED NONE PRSNT: CPT | Performed by: ORTHOPAEDIC SURGERY

## 2024-01-23 PROCEDURE — 73030 X-RAY EXAM OF SHOULDER: CPT | Mod: LEFT SIDE | Performed by: RADIOLOGY

## 2024-01-23 ASSESSMENT — PAIN SCALES - GENERAL: PAINLEVEL_OUTOF10: 2

## 2024-01-23 ASSESSMENT — PAIN - FUNCTIONAL ASSESSMENT: PAIN_FUNCTIONAL_ASSESSMENT: 0-10

## 2024-01-30 DIAGNOSIS — R93.1 DECREASED CARDIAC EJECTION FRACTION: ICD-10-CM

## 2024-01-30 RX ORDER — DAPAGLIFLOZIN 10 MG/1
10 TABLET, FILM COATED ORAL
Qty: 90 TABLET | Refills: 11 | Status: SHIPPED | OUTPATIENT
Start: 2024-01-30

## 2024-02-07 ENCOUNTER — OFFICE VISIT (OUTPATIENT)
Dept: PRIMARY CARE | Facility: CLINIC | Age: 69
End: 2024-02-07
Payer: MEDICARE

## 2024-02-07 ENCOUNTER — LAB (OUTPATIENT)
Dept: LAB | Facility: LAB | Age: 69
End: 2024-02-07
Payer: MEDICARE

## 2024-02-07 VITALS
RESPIRATION RATE: 14 BRPM | OXYGEN SATURATION: 99 % | DIASTOLIC BLOOD PRESSURE: 76 MMHG | SYSTOLIC BLOOD PRESSURE: 138 MMHG | BODY MASS INDEX: 41.55 KG/M2 | HEART RATE: 82 BPM | TEMPERATURE: 97.7 F | HEIGHT: 65 IN | WEIGHT: 249.4 LBS

## 2024-02-07 DIAGNOSIS — Z12.31 ENCOUNTER FOR SCREENING MAMMOGRAM FOR BREAST CANCER: ICD-10-CM

## 2024-02-07 DIAGNOSIS — E66.01 CLASS 3 SEVERE OBESITY WITH SERIOUS COMORBIDITY AND BODY MASS INDEX (BMI) OF 40.0 TO 44.9 IN ADULT, UNSPECIFIED OBESITY TYPE (MULTI): ICD-10-CM

## 2024-02-07 DIAGNOSIS — M17.12 PRIMARY OSTEOARTHRITIS OF LEFT KNEE: ICD-10-CM

## 2024-02-07 DIAGNOSIS — I47.29 VENTRICULAR TACHYCARDIA (PAROXYSMAL) (MULTI): ICD-10-CM

## 2024-02-07 DIAGNOSIS — M96.1 LUMBAR POST-LAMINECTOMY SYNDROME: ICD-10-CM

## 2024-02-07 DIAGNOSIS — I73.9 PAD (PERIPHERAL ARTERY DISEASE) (CMS-HCC): ICD-10-CM

## 2024-02-07 DIAGNOSIS — I50.9 HEART FAILURE, UNSPECIFIED HF CHRONICITY, UNSPECIFIED HEART FAILURE TYPE (MULTI): ICD-10-CM

## 2024-02-07 DIAGNOSIS — Z13.89 ENCOUNTER FOR SCREENING FOR OTHER DISORDER: ICD-10-CM

## 2024-02-07 DIAGNOSIS — R73.03 PRE-DIABETES: ICD-10-CM

## 2024-02-07 DIAGNOSIS — Z00.00 ENCOUNTER FOR MEDICARE ANNUAL WELLNESS EXAM: Primary | ICD-10-CM

## 2024-02-07 DIAGNOSIS — D64.9 ANEMIA, UNSPECIFIED TYPE: ICD-10-CM

## 2024-02-07 DIAGNOSIS — M54.50 BACK PAIN, LUMBOSACRAL: ICD-10-CM

## 2024-02-07 DIAGNOSIS — Z00.00 ENCOUNTER FOR MEDICARE ANNUAL WELLNESS EXAM: ICD-10-CM

## 2024-02-07 PROBLEM — D17.1 LIPOMA OF BACK: Status: ACTIVE | Noted: 2024-02-07

## 2024-02-07 PROBLEM — K85.90 ACUTE PANCREATITIS (HHS-HCC): Status: ACTIVE | Noted: 2024-02-07

## 2024-02-07 PROBLEM — M79.9 DISORDER OF SOFT TISSUE: Status: ACTIVE | Noted: 2024-02-07

## 2024-02-07 PROBLEM — Z86.79 HISTORY OF HYPERTENSION: Status: ACTIVE | Noted: 2024-02-07

## 2024-02-07 PROBLEM — Z86.0100 HISTORY OF COLONIC POLYPS: Status: ACTIVE | Noted: 2024-02-07

## 2024-02-07 PROBLEM — M26.609 TEMPOROMANDIBULAR JOINT DISORDER: Status: ACTIVE | Noted: 2024-02-07

## 2024-02-07 PROBLEM — Z86.010 HISTORY OF COLONIC POLYPS: Status: ACTIVE | Noted: 2024-02-07

## 2024-02-07 PROBLEM — Z86.2 HISTORY OF IRON DEFICIENCY ANEMIA: Status: ACTIVE | Noted: 2024-02-07

## 2024-02-07 PROBLEM — R09.89 PULMONARY CONGESTION: Status: ACTIVE | Noted: 2024-02-07

## 2024-02-07 PROBLEM — M21.869: Status: ACTIVE | Noted: 2024-02-07

## 2024-02-07 PROBLEM — R63.4 WEIGHT LOSS: Status: RESOLVED | Noted: 2023-03-20 | Resolved: 2024-02-07

## 2024-02-07 PROBLEM — E66.9 CLASS 1 OBESITY: Status: ACTIVE | Noted: 2024-02-07

## 2024-02-07 PROBLEM — E66.9 CLASS 1 OBESITY: Status: RESOLVED | Noted: 2024-02-07 | Resolved: 2024-02-07

## 2024-02-07 PROBLEM — E66.811 CLASS 1 OBESITY: Status: RESOLVED | Noted: 2024-02-07 | Resolved: 2024-02-07

## 2024-02-07 PROBLEM — E66.812 CLASS 2 SEVERE OBESITY WITH SERIOUS COMORBIDITY AND BODY MASS INDEX (BMI) OF 38.0 TO 38.9 IN ADULT: Status: RESOLVED | Noted: 2023-03-20 | Resolved: 2024-02-07

## 2024-02-07 PROBLEM — E66.811 CLASS 1 OBESITY: Status: ACTIVE | Noted: 2024-02-07

## 2024-02-07 PROBLEM — E87.6 HYPOKALEMIA: Status: ACTIVE | Noted: 2024-02-07

## 2024-02-07 PROBLEM — S60.212A CONTUSION OF LEFT WRIST: Status: ACTIVE | Noted: 2024-02-07

## 2024-02-07 PROBLEM — I51.89 IMPAIRED LEFT VENTRICULAR FUNCTION: Status: ACTIVE | Noted: 2024-02-07

## 2024-02-07 LAB
ALBUMIN SERPL BCP-MCNC: 4.4 G/DL (ref 3.4–5)
ALP SERPL-CCNC: 119 U/L (ref 33–136)
ALT SERPL W P-5'-P-CCNC: 16 U/L (ref 7–45)
ANION GAP SERPL CALC-SCNC: 14 MMOL/L (ref 10–20)
AST SERPL W P-5'-P-CCNC: 17 U/L (ref 9–39)
BASOPHILS # BLD AUTO: 0.05 X10*3/UL (ref 0–0.1)
BASOPHILS NFR BLD AUTO: 0.7 %
BILIRUB SERPL-MCNC: 0.4 MG/DL (ref 0–1.2)
BUN SERPL-MCNC: 15 MG/DL (ref 6–23)
CALCIUM SERPL-MCNC: 10.1 MG/DL (ref 8.6–10.6)
CHLORIDE SERPL-SCNC: 104 MMOL/L (ref 98–107)
CHOLEST SERPL-MCNC: 175 MG/DL (ref 0–199)
CHOLESTEROL/HDL RATIO: 3.3
CO2 SERPL-SCNC: 28 MMOL/L (ref 21–32)
CREAT SERPL-MCNC: 0.83 MG/DL (ref 0.5–1.05)
EGFRCR SERPLBLD CKD-EPI 2021: 76 ML/MIN/1.73M*2
EOSINOPHIL # BLD AUTO: 0.27 X10*3/UL (ref 0–0.7)
EOSINOPHIL NFR BLD AUTO: 3.7 %
ERYTHROCYTE [DISTWIDTH] IN BLOOD BY AUTOMATED COUNT: 13.4 % (ref 11.5–14.5)
EST. AVERAGE GLUCOSE BLD GHB EST-MCNC: 148 MG/DL
GLUCOSE SERPL-MCNC: 126 MG/DL (ref 74–99)
HBA1C MFR BLD: 6.8 %
HCT VFR BLD AUTO: 41.1 % (ref 36–46)
HDLC SERPL-MCNC: 52.5 MG/DL
HGB BLD-MCNC: 12.9 G/DL (ref 12–16)
IMM GRANULOCYTES # BLD AUTO: 0 X10*3/UL (ref 0–0.7)
IMM GRANULOCYTES NFR BLD AUTO: 0 % (ref 0–0.9)
LDLC SERPL CALC-MCNC: 95 MG/DL
LYMPHOCYTES # BLD AUTO: 3.01 X10*3/UL (ref 1.2–4.8)
LYMPHOCYTES NFR BLD AUTO: 41.2 %
MCH RBC QN AUTO: 29.2 PG (ref 26–34)
MCHC RBC AUTO-ENTMCNC: 31.4 G/DL (ref 32–36)
MCV RBC AUTO: 93 FL (ref 80–100)
MONOCYTES # BLD AUTO: 0.3 X10*3/UL (ref 0.1–1)
MONOCYTES NFR BLD AUTO: 4.1 %
NEUTROPHILS # BLD AUTO: 3.67 X10*3/UL (ref 1.2–7.7)
NEUTROPHILS NFR BLD AUTO: 50.3 %
NON HDL CHOLESTEROL: 123 MG/DL (ref 0–149)
NRBC BLD-RTO: 0 /100 WBCS (ref 0–0)
PLATELET # BLD AUTO: 318 X10*3/UL (ref 150–450)
POTASSIUM SERPL-SCNC: 4.4 MMOL/L (ref 3.5–5.3)
PROT SERPL-MCNC: 7.1 G/DL (ref 6.4–8.2)
RBC # BLD AUTO: 4.42 X10*6/UL (ref 4–5.2)
SODIUM SERPL-SCNC: 142 MMOL/L (ref 136–145)
TRIGL SERPL-MCNC: 137 MG/DL (ref 0–149)
TSH SERPL-ACNC: 1.09 MIU/L (ref 0.44–3.98)
VLDL: 27 MG/DL (ref 0–40)
WBC # BLD AUTO: 7.3 X10*3/UL (ref 4.4–11.3)

## 2024-02-07 PROCEDURE — 1170F FXNL STATUS ASSESSED: CPT | Performed by: FAMILY MEDICINE

## 2024-02-07 PROCEDURE — 1036F TOBACCO NON-USER: CPT | Performed by: FAMILY MEDICINE

## 2024-02-07 PROCEDURE — 1123F ACP DISCUSS/DSCN MKR DOCD: CPT | Performed by: FAMILY MEDICINE

## 2024-02-07 PROCEDURE — 36415 COLL VENOUS BLD VENIPUNCTURE: CPT

## 2024-02-07 PROCEDURE — 3078F DIAST BP <80 MM HG: CPT | Performed by: FAMILY MEDICINE

## 2024-02-07 PROCEDURE — 3008F BODY MASS INDEX DOCD: CPT | Performed by: FAMILY MEDICINE

## 2024-02-07 PROCEDURE — 85025 COMPLETE CBC W/AUTO DIFF WBC: CPT

## 2024-02-07 PROCEDURE — 84443 ASSAY THYROID STIM HORMONE: CPT

## 2024-02-07 PROCEDURE — 1160F RVW MEDS BY RX/DR IN RCRD: CPT | Performed by: FAMILY MEDICINE

## 2024-02-07 PROCEDURE — 1158F ADVNC CARE PLAN TLK DOCD: CPT | Performed by: FAMILY MEDICINE

## 2024-02-07 PROCEDURE — 80053 COMPREHEN METABOLIC PANEL: CPT

## 2024-02-07 PROCEDURE — 1126F AMNT PAIN NOTED NONE PRSNT: CPT | Performed by: FAMILY MEDICINE

## 2024-02-07 PROCEDURE — G0439 PPPS, SUBSEQ VISIT: HCPCS | Performed by: FAMILY MEDICINE

## 2024-02-07 PROCEDURE — 83036 HEMOGLOBIN GLYCOSYLATED A1C: CPT

## 2024-02-07 PROCEDURE — 80061 LIPID PANEL: CPT

## 2024-02-07 PROCEDURE — 3075F SYST BP GE 130 - 139MM HG: CPT | Performed by: FAMILY MEDICINE

## 2024-02-07 PROCEDURE — 1159F MED LIST DOCD IN RCRD: CPT | Performed by: FAMILY MEDICINE

## 2024-02-07 RX ORDER — DICLOFENAC SODIUM 10 MG/G
2 GEL TOPICAL 4 TIMES DAILY
Qty: 100 G | Refills: 3 | Status: SHIPPED | OUTPATIENT
Start: 2024-02-07

## 2024-02-07 ASSESSMENT — ACTIVITIES OF DAILY LIVING (ADL)
DRESSING: INDEPENDENT
TAKING_MEDICATION: INDEPENDENT
BATHING: INDEPENDENT
DOING_HOUSEWORK: NEEDS ASSISTANCE
MANAGING_FINANCES: INDEPENDENT
GROCERY_SHOPPING: INDEPENDENT

## 2024-02-07 ASSESSMENT — PATIENT HEALTH QUESTIONNAIRE - PHQ9
2. FEELING DOWN, DEPRESSED OR HOPELESS: NOT AT ALL
SUM OF ALL RESPONSES TO PHQ9 QUESTIONS 1 AND 2: 0
1. LITTLE INTEREST OR PLEASURE IN DOING THINGS: NOT AT ALL

## 2024-02-07 NOTE — PATIENT INSTRUCTIONS
Health Maintenance:  - Colonoscopy:- 11/15/22- repeat 3-5 yrs  - Mammogram: 8/30/22- ordered another  - PAP: n/a  - Bone density DEXA:- 4/27/23- repeat in 2 yrs   TDAP/ shingrix/ RSV  due - can get from local pharm    Labs today    Back pain  - referral for water PT  - referral for pain ( Dr. Dockery)    Continue to work on healthy diet and exercise  We encourage all patients to engage in exercise 150 minutes per week   Adults 18 and older, activity during each week - if you are able:    Engage in at least 150 minutes of moderate or vigorous exercise per week   If you are able- Engage in muscle strengthening activity on 2 or more days per week    HTN  - Your blood pressure is NEAR goal today- goal is less than 130/80  - spironolactone caused too low bp  - Continue your current medications  - Weight loss can help lower your bp!  Work on a healthy whole food diet and add at least 30min of exercise 5 days per week  - Work on a low salt diet       Please follow up in 1 yr for medicare wellness and 6 months for HTN as needed.       ** If labs or imaging ordered at today's visit, all the non-urgent results will be discussed at your next visit    If you have been referred for a special test or to a specialist please call  [phone] to schedule an appointment.  If you have any further questions, or if develop new or worsened symptoms, please give our office a call at [phone].

## 2024-02-07 NOTE — PROGRESS NOTES
"Subjective   Reason for Visit: Jerri Ramirez is an 68 y.o. female here for a Medicare Wellness visit.     Past Medical, Surgical, and Family History reviewed and updated in chart.    Reviewed all medications by prescribing practitioner or clinical pharmacist (such as prescriptions, OTCs, herbal therapies and supplements) and documented in the medical record.    hospitals    Patient Care Team:  Trina Floyd DO as PCP - General  Trina Floyd DO as PCP - [facility]ributed [facility]  Reginald Lerner MD as PCP - Cardiology (Cardiology)     Health Maintenance:  - Colonoscopy:- 11/15/22- repeat 3-5 yrs  - Mammogram:8/30/22- ordered another  - PAP: n/a  -Bone density DEXA:- 4/27/23- repeat in 2 yrs   TDAP/ shingrix/ Flu/ RSV  due     HTN  BP NEAR goal today in office.  Using medications without issues.  Denies CP, SOB, palpitations, change in vision, dizziness, N/V.  Used spironolactone in the past made her bp too low      Chronic pain  Using cane  For back and knee pain  also with shoulder pain  States in pain X 1-2 wks worsened in the back 6/10  Seen by pain team in the past 3+ yrs ago  Seen by ortho spine- recommended pt/ pain if not better  Would like to try PT and revisit  pain management      Review of Systems  All systems reviewed and neg if not noted in the HPI above     Objective   Vitals:  /76   Pulse 82   Temp 36.5 °C (97.7 °F)   Resp 14   Ht 1.651 m (5' 5\")   Wt 113 kg (249 lb 6.4 oz)   SpO2 99%   BMI 41.50 kg/m²       Physical Exam  Pleasant  Eyes: conjunctiva non-icteric and eye lids are without obvious rash or drooping. Pupils are symmetric.   Ears, Nose, Mouth, and Throat: External ears and nose appear to be without deformity or rash. No lesions or masses noted. Hearing is grossly intact.   CV: RRR, no murmur  Carotids: no bruits  Pulm:CTA B/L  Abd: soft, NTTP, + BS  LE: no edema  Psychiatric: Alert, orientation to person, place, and time. Recent/remote memory as evidenced through face-to-face " interaction and discussion appear grossly intact. Mood and affect are normal.      Assessment/Plan   Problem List Items Addressed This Visit       Back pain, lumbosacral    Relevant Orders    Referral to Physical Therapy    Referral to Pain Medicine    Heart failure (CMS/[facility])    Overview     Chronic condition documentation: stable based on symptoms and exam. Continue established treatment plan and follow-up at least yearly.         Current Assessment & Plan     Continue meds  Followed by cardio         Relevant Orders    Lipid Panel    Hemoglobin A1C    Osteoarthritis of left knee    Relevant Medications    diclofenac sodium (Voltaren) 1 % gel    PAD (peripheral artery disease) (CMS/[facility])    Overview     Chronic condition documentation: stable based on symptoms and exam. Continue established treatment plan and follow-up at least yearly.         Current Assessment & Plan     Stbale  Continue to monitor         Relevant Orders    Lipid Panel    Hemoglobin A1C    Pre-diabetes    Relevant Orders    Comprehensive Metabolic Panel    Hemoglobin A1C    Ventricular tachycardia (paroxysmal) (CMS/[facility])    Current Assessment & Plan     Stable-   Will continue to monitor         Lumbar post-laminectomy syndrome    Relevant Orders    Referral to Physical Therapy    Referral to Pain Medicine    Anemia    Relevant Orders    CBC and Auto Differential     Other Visit Diagnoses       Encounter for Medicare annual wellness exam    -  Primary    Relevant Orders    Comprehensive Metabolic Panel    Lipid Panel    TSH with reflex to Free T4 if abnormal    CBC and Auto Differential    Hemoglobin A1C    Encounter for screening for other disorder        Class 3 severe obesity with serious comorbidity and body mass index (BMI) of 40.0 to 44.9 in adult, unspecified obesity type (CMS/[facility])        Relevant Orders    Lipid Panel    Hemoglobin A1C    Encounter for screening mammogram for breast cancer        Relevant Orders    BI mammo bilateral  screening tomosynthesis        HTN  - Your blood pressure is NEAR goal today- goal is less than 130/80  - spironolactone caused too low bp  - Continue your current medications  - Weight loss can help lower your bp!  Work on a healthy whole food diet and add at least 30min of exercise 5 days per week  - Work on a low salt diet            Please follow up in 1 yr for medicare wellness and 6 months for HTN as needed.

## 2024-02-20 ENCOUNTER — HOSPITAL ENCOUNTER (OUTPATIENT)
Dept: CARDIOLOGY | Facility: CLINIC | Age: 69
Discharge: HOME | End: 2024-02-20
Payer: MEDICARE

## 2024-02-20 DIAGNOSIS — Z95.810 PRESENCE OF AUTOMATIC CARDIOVERTER/DEFIBRILLATOR (AICD): ICD-10-CM

## 2024-02-20 DIAGNOSIS — I47.20 VT (VENTRICULAR TACHYCARDIA) (MULTI): ICD-10-CM

## 2024-02-20 PROCEDURE — 93296 REM INTERROG EVL PM/IDS: CPT

## 2024-02-20 PROCEDURE — 93295 DEV INTERROG REMOTE 1/2/MLT: CPT | Performed by: INTERNAL MEDICINE

## 2024-02-29 ENCOUNTER — APPOINTMENT (OUTPATIENT)
Dept: PAIN MEDICINE | Facility: HOSPITAL | Age: 69
End: 2024-02-29
Payer: MEDICARE

## 2024-03-12 ENCOUNTER — OFFICE VISIT (OUTPATIENT)
Dept: PAIN MEDICINE | Facility: HOSPITAL | Age: 69
End: 2024-03-12
Payer: MEDICARE

## 2024-03-12 DIAGNOSIS — M54.50 BACK PAIN, LUMBOSACRAL: ICD-10-CM

## 2024-03-12 DIAGNOSIS — M96.1 LUMBAR POST-LAMINECTOMY SYNDROME: ICD-10-CM

## 2024-03-12 PROCEDURE — 1160F RVW MEDS BY RX/DR IN RCRD: CPT | Performed by: ANESTHESIOLOGY

## 2024-03-12 PROCEDURE — 1123F ACP DISCUSS/DSCN MKR DOCD: CPT | Performed by: ANESTHESIOLOGY

## 2024-03-12 PROCEDURE — 99214 OFFICE O/P EST MOD 30 MIN: CPT | Performed by: ANESTHESIOLOGY

## 2024-03-12 PROCEDURE — 3008F BODY MASS INDEX DOCD: CPT | Performed by: ANESTHESIOLOGY

## 2024-03-12 PROCEDURE — 1159F MED LIST DOCD IN RCRD: CPT | Performed by: ANESTHESIOLOGY

## 2024-03-12 PROCEDURE — 1125F AMNT PAIN NOTED PAIN PRSNT: CPT | Performed by: ANESTHESIOLOGY

## 2024-03-12 PROCEDURE — 1036F TOBACCO NON-USER: CPT | Performed by: ANESTHESIOLOGY

## 2024-03-12 ASSESSMENT — PAIN SCALES - GENERAL: PAINLEVEL: 7

## 2024-03-12 NOTE — PROGRESS NOTES
Chief Complaint   Patient presents with    Back Pain     History Of Present Illness  Jerri Ramirez is a 68 y.o. female with a past medical history of hypertension, CAD, heart failure s/p defibrillator, peripheral arterial disease, prediabetes, paroxysmal ventral tachycardia, osteopenia, and postlaminectomy syndrome (L3-S1 fusion 2015) for management of low back pain.  Patient does have extensive surgical history including bilateral hip replacements, bilateral knee replacements as well as left shoulder replacement.  Patient was last seen in pain clinic in 2020 in which we saw saw her for shoulder pain.  We saw her her back pain as well and were considering a caudal epidural injection as she was having low back pain that radiates into her buttocks bilaterally.  However she ultimately did not undergo this injection.  In regards to her current pain she says she has pain in the right side of her low back that is worse with prolonged standing and walking.  She is not endorsing any radicular pain or neurological deficits at this time.  She is very eager to start aqua therapy as this has helped her in the past however she states this flank pain is preventing her from going to physical therapy.  She recently saw Dr. Mcintosh that recommended mended follow-up with pain management.  She states she has not had pain like this prior however she has had the same pain on the left side that appeared to now transition to the right.  In regards to medications she takes gabapentin 600 mg twice daily.  Plain films showing lumbar fusion laminectomy L3-S1 with vertebroplasty anteriorly at L3.  [employer] defibrillator device seen.  It is not a spinal cord stimulator as reported on x-ray. The pain causes significant stress in the patient's life, specifically interferes with general activity, mood, walking ability, ability to perform tasks at home and/or work.  Patient participates in physical therapy and continues to perform physician  directed exercises at home. Denies any bowel or bladder incontinence, saddle anesthesia, worsening pain, weakness or falls.     Past Medical History  She has a past medical history of Abnormal finding of blood chemistry, unspecified (11/06/2019), Acute kidney failure, unspecified (CMS/HCC), Acute posthemorrhagic anemia, Adjustment disorder with depressed mood (11/13/2019), Atherosclerotic heart disease of native coronary artery without angina pectoris (11/16/2017), Bilateral primary osteoarthritis of knee, Body mass index (BMI)40.0-44.9, adult (02/12/2021), Contusion of left wrist, initial encounter (11/21/2019), Dermatitis, unspecified (01/12/2018), Encounter for follow-up examination after completed treatment for conditions other than malignant neoplasm (07/30/2021), Encounter for other preprocedural examination (05/20/2021), Encounter for other screening for malignant neoplasm of breast (10/30/2020), Encounter for other screening for malignant neoplasm of breast (04/20/2017), History of falling (11/20/2019), History of falling (11/13/2019), Hypomagnesemia, Myalgia, other site (10/26/2017), Nondisplaced transverse fracture of left patella, subsequent encounter for closed fracture with nonunion (10/28/2018), Obesity, unspecified (10/31/2017), Other hemorrhoids, Other injury of unspecified body region, initial encounter (01/08/2018), Other specified acquired deformities of right lower leg, Other specified symptoms and signs involving the circulatory and respiratory systems (02/12/2021), Overweight, Pain in unspecified hand (11/20/2019), Pain in unspecified hip (10/26/2017), Pain in unspecified knee (06/02/2016), Personal history of colonic polyps, Personal history of diseases of the blood and blood-forming organs and certain disorders involving the immune mechanism, Personal history of other benign neoplasm (06/22/2016), Personal history of other diseases of the circulatory system, Personal history of other diseases  of the circulatory system (10/10/2016), Personal history of other diseases of the digestive system (2019), Personal history of other diseases of the musculoskeletal system and connective tissue (2013), Personal history of other diseases of the musculoskeletal system and connective tissue (10/26/2017), Personal history of other diseases of the musculoskeletal system and connective tissue (2016), Personal history of other diseases of the musculoskeletal system and connective tissue (2021), Personal history of other endocrine, nutritional and metabolic disease (2019), Personal history of other endocrine, nutritional and metabolic disease, Personal history of other specified conditions (2019), Personal history of peptic ulcer disease, Presence of left artificial knee joint (2020), Radiculopathy, lumbar region (2019), Reaction to severe stress, unspecified (2019), Spondylolysis, cervical region, Unspecified symptoms and signs involving the genitourinary system (2018), Unspecified systolic (congestive) heart failure (CMS/HCC) (2021), and Urge incontinence (2016).    Surgical History  She has a past surgical history that includes Hip surgery (2013); Gallbladder surgery (2013);  section, classic (2013); Foot surgery (2013); Other surgical history (2020); Coronary angioplasty with stent (2017); Knee surgery (2017); and CT angio aorta and bilateral iliofemoral runoff w and or wo IV contrast (2017).     Social History  She reports that she has never smoked. She has never used smokeless tobacco. She reports that she does not drink alcohol and does not use drugs.    Family History  Family History   Problem Relation Name Age of Onset    Coronary artery disease Sibling      Sudden death Sibling          sudden cardiac death (SCD)    Throat cancer Sibling          Allergies  Lisinopril,  Lisinopril-hydrochlorothiazide, and Tramadol    Review of Symptoms:   Constitutional: Negative for chills, diaphoresis or fever  HENT: Negative for neck swelling  Eyes:.  Negative for eye pain  Respiratory:.  Negative for cough, shortness of breath or wheezing    Cardiovascular:.  Negative for chest pain or palpitations  Gastrointestinal:.  Negative for abdominal pain, nausea and vomiting  Genitourinary:.  Negative for urgency  Musculoskeletal:  Positive for back pain. Positive for joint pain. Denies falls within the past 3 months.  Skin: Negative for wounds or itching   Neurological: Negative for dizziness, seizures, loss of consciousness and weakness  Endo/Heme/Allergies: Does not bruise/bleed easily  Psychiatric/Behavioral: Negative for depression. The patient does not appear anxious.       PHYSICAL EXAM  Vitals signs reviewed  Constitutional:       General: Not in acute distress     Appearance: Normal appearance. Not ill-appearing.  HENT:     Head: Normocephalic and atraumatic  Eyes:     Conjunctiva/sclera: Conjunctivae normal  Cardiovascular:     Rate and Rhythm: Normal rate and regular rhythm  Pulmonary:     Effort: No respiratory distress  Abdominal:     Palpations: Abdomen is soft  Musculoskeletal: BRINK  Skin:     General: Skin is warm and dry  Neurological:     General: No focal deficit present  Psychiatric:         Mood and Affect: Mood normal         Behavior: Behavior normal    Advanced Exam   Inspection: No gross deformities, scar well-healed  Palpation: Palpation over right facets, right lumbar paraspinal as well as right SI joint  ROM: Normal range of motion of the lumbar flexion extension  Motor: 4-5 strength lower extremities  Sensory: Negative for sensory abnormalities in upper and lower extremities  Reflexes: 2+ reflexes bilateral upper and lower extremities  Lumbar: Negative straight leg raising bilaterally, negative for facet loading  Sacral: Negative Ebenezer, negative Gaenslen's  Hip: Negative for  pain with anterior, lateral, posterior palpation of hip joints, negative FADIR, negative for internal/external rotation of the hip, negative logroll     Last Recorded Vitals  There were no vitals taken for this visit.    Relevant Results  Current Outpatient Medications   Medication Instructions    albuterol (Ventolin HFA) 90 mcg/actuation inhaler 2 puffs, inhalation, Every 4 hours PRN    amLODIPine (NORVASC) 10 mg, oral, Daily    aspirin 81 mg EC tablet 1 tablet, oral, Daily    atorvastatin (Lipitor) 80 mg tablet TAKE 1 TABLET AT BEDTIME    carvedilol (Coreg) 25 mg tablet TAKE 1 TABLET TWICE A DAY    dapagliflozin propanediol (FARXIGA) 10 mg, oral, Daily before breakfast    diclofenac sodium (VOLTAREN) 2 g, Topical, 4 times daily, Apply to upper extremities, to affected area. Do not apply more than 8 GM daily to any one affected joint    fluticasone (Flonase) 50 mcg/actuation nasal spray 2 sprays, Each Nostril, Daily    furosemide (LASIX) 20 mg, oral, Daily, As directed    gabapentin (Neurontin) 600 mg tablet 2 tablets, oral, 3 times daily    magnesium oxide (MAG-OX) 400 mg, oral, Daily    multivit-min/iron/folic/lutein (MULTIVITAMIN WOMEN 50 PLUS ORAL) 1 tablet, oral, Daily, Multivitamin Women 50+ oral tablet    omeprazole (PRILOSEC) 20 mg, oral, Daily before breakfast    trospium (SANCTURA XR) 60 mg, oral, Daily         XR ENTIRE SPINE 2 OR 3 VIEWS 08/31/2021    Narrative  MRN: [id number]  Patient Name: DENAE RUFF    STUDY:  SPINE, ENTIRE THORACIC/LUMBAR, INCLUDE SKULL, CERVICAL ANSD SACRAL  SPINE WHEN PERFORMED 2 OR 3 VIEW; ;  8/31/2021 2:50 pm    INDICATION:  Scoliosis.    COMPARISON:  None.    ACCESSION NUMBER(S):  [id number]    ORDERING CLINICIAN:  MOON QUINONEZ    FINDINGS:  Mild S shaped scoliotic curvature of the thoracolumbar spine.  Internal fixation posterior transpedicular fusion of the lower lumbar  spine. No acute or complication. No acute fracture. Background of  multilevel spondylosis. Left-sided  abdominal wall pulse generator  device present. Stimulator lead terminating over the midline.    Impression  Mild S shaped scoliotic curvature of the thoracolumbar spine.    Background of multilevel spondylosis.     No image results found.       1. Lumbar post-laminectomy syndrome  Referral to Pain Medicine      2. Back pain, lumbosacral  Referral to Pain Medicine           ASSESSMENT/PLAN  Jerri Ramirez is a 68 y.o. female with a past medical history of hypertension, CAD, heart failure s/p defibrillator, peripheral arterial disease, prediabetes, paroxysmal ventral tachycardia and postlaminectomy syndrome (L3-S1 fusion 2015) for management of low back pain.  Patient has right axial paraspinal pain that is worse with movement and activity.  She is not having any radicular pain or neurological deficits at this time.  Spine surgery recommending conservative management.  Last advanced imaging we have was from 2017, and would likely benefit from new MRI, unsure if defibrillator device is MRI compatible at this time. In the interim patient may benefit from bilateral L2 transforaminal epidural steroid injections    Our plan is as follows:  - Bilateral L2 transforaminal epidural steroid injection.  Procedure, risk, benefits, and alternatives reviewed with the patient.  - Continue to participate in physical therapy as well as physician directed home exercises  - Continue pain medications as prescribed       Ralph Khanna MD

## 2024-03-27 DIAGNOSIS — E11.9 NEW ONSET TYPE 2 DIABETES MELLITUS (MULTI): Primary | ICD-10-CM

## 2024-03-27 PROBLEM — Z98.890 HISTORY OF REVERSE TOTAL REPLACEMENT OF RIGHT SHOULDER JOINT: Status: ACTIVE | Noted: 2024-03-27

## 2024-03-27 PROBLEM — I47.20 VENTRICULAR TACHYCARDIA, UNSPECIFIED (MULTI): Status: ACTIVE | Noted: 2023-03-20

## 2024-03-28 ENCOUNTER — OFFICE VISIT (OUTPATIENT)
Dept: UROLOGY | Facility: CLINIC | Age: 69
End: 2024-03-28
Payer: MEDICARE

## 2024-03-28 VITALS
WEIGHT: 251 LBS | BODY MASS INDEX: 41.82 KG/M2 | HEART RATE: 84 BPM | SYSTOLIC BLOOD PRESSURE: 163 MMHG | HEIGHT: 65 IN | DIASTOLIC BLOOD PRESSURE: 87 MMHG | RESPIRATION RATE: 16 BRPM | OXYGEN SATURATION: 98 % | TEMPERATURE: 98 F

## 2024-03-28 DIAGNOSIS — N39.41 URGE INCONTINENCE OF URINE: ICD-10-CM

## 2024-03-28 DIAGNOSIS — R39.9 LOWER URINARY TRACT SYMPTOMS (LUTS): Primary | ICD-10-CM

## 2024-03-28 LAB
POC APPEARANCE, URINE: CLEAR
POC BILIRUBIN, URINE: NEGATIVE
POC BLOOD, URINE: NEGATIVE
POC COLOR, URINE: YELLOW
POC GLUCOSE, URINE: ABNORMAL MG/DL
POC KETONES, URINE: NEGATIVE MG/DL
POC LEUKOCYTES, URINE: NEGATIVE
POC NITRITE,URINE: NEGATIVE
POC PH, URINE: 7.5 PH
POC PROTEIN, URINE: ABNORMAL MG/DL
POC SPECIFIC GRAVITY, URINE: 1.02
POC UROBILINOGEN, URINE: 0.2 EU/DL

## 2024-03-28 PROCEDURE — 99213 OFFICE O/P EST LOW 20 MIN: CPT | Mod: ZK | Performed by: PHYSICIAN ASSISTANT

## 2024-03-28 PROCEDURE — 99213 OFFICE O/P EST LOW 20 MIN: CPT | Performed by: PHYSICIAN ASSISTANT

## 2024-03-28 PROCEDURE — 3077F SYST BP >= 140 MM HG: CPT | Performed by: PHYSICIAN ASSISTANT

## 2024-03-28 PROCEDURE — 3008F BODY MASS INDEX DOCD: CPT | Performed by: PHYSICIAN ASSISTANT

## 2024-03-28 PROCEDURE — 51798 US URINE CAPACITY MEASURE: CPT | Performed by: PHYSICIAN ASSISTANT

## 2024-03-28 PROCEDURE — 1036F TOBACCO NON-USER: CPT | Performed by: PHYSICIAN ASSISTANT

## 2024-03-28 PROCEDURE — 81003 URINALYSIS AUTO W/O SCOPE: CPT | Mod: QW,91,ZK | Performed by: PHYSICIAN ASSISTANT

## 2024-03-28 PROCEDURE — 1159F MED LIST DOCD IN RCRD: CPT | Performed by: PHYSICIAN ASSISTANT

## 2024-03-28 PROCEDURE — 1160F RVW MEDS BY RX/DR IN RCRD: CPT | Performed by: PHYSICIAN ASSISTANT

## 2024-03-28 PROCEDURE — 1123F ACP DISCUSS/DSCN MKR DOCD: CPT | Performed by: PHYSICIAN ASSISTANT

## 2024-03-28 PROCEDURE — 3079F DIAST BP 80-89 MM HG: CPT | Performed by: PHYSICIAN ASSISTANT

## 2024-03-28 PROCEDURE — 51798 US URINE CAPACITY MEASURE: CPT | Mod: ZK | Performed by: PHYSICIAN ASSISTANT

## 2024-03-28 PROCEDURE — 1125F AMNT PAIN NOTED PAIN PRSNT: CPT | Performed by: PHYSICIAN ASSISTANT

## 2024-03-28 ASSESSMENT — ENCOUNTER SYMPTOMS
HEMATOLOGIC/LYMPHATIC NEGATIVE: 1
CARDIOVASCULAR NEGATIVE: 1
EYES NEGATIVE: 1
PSYCHIATRIC NEGATIVE: 1
CONSTITUTIONAL NEGATIVE: 1
GASTROINTESTINAL NEGATIVE: 1
DEPRESSION: 0
OCCASIONAL FEELINGS OF UNSTEADINESS: 1
NEUROLOGICAL NEGATIVE: 1
ENDOCRINE NEGATIVE: 1
MUSCULOSKELETAL NEGATIVE: 1
LOSS OF SENSATION IN FEET: 0
RESPIRATORY NEGATIVE: 1
ALLERGIC/IMMUNOLOGIC NEGATIVE: 1

## 2024-03-28 ASSESSMENT — PAIN SCALES - GENERAL: PAINLEVEL: 10-WORST PAIN EVER

## 2024-03-28 NOTE — PROGRESS NOTES
Subjective   Patient ID: Jerri Ramirez is a 68 y.o. female who presents for Follow-up.  HPI  Patient is a 69 yo female with OAB on trospium 60 mg seen for a follow up     Patient is doing well symptomatically.  She was started on furosemide since last seen.  She reports in the morning she has increased urinary frequency and urgency and urgency incontinence.  In the afternoon she does very well.  She denies nocturia.    Patient would like to start aqua therapy again.  She is concerned about urgency incontinence while in the pool.    Patient reports great control on trospium. Patient denies difficulty urinating. She denies urinary frequency or urgency. She denies urinary incontinence.      Patient reports she is currently taking Trospium 60 mg at bedtime and trospium 20 mg BID. She denies any side effects.      UA shows a trace of protein and glucose 500 mg.  Patient is on Farxiga.  PVR 51  Review of Systems   Constitutional: Negative.    HENT: Negative.     Eyes: Negative.    Respiratory: Negative.     Cardiovascular: Negative.    Gastrointestinal: Negative.    Endocrine: Negative.    Genitourinary: Negative.    Musculoskeletal: Negative.    Skin: Negative.    Allergic/Immunologic: Negative.    Neurological: Negative.    Hematological: Negative.    Psychiatric/Behavioral: Negative.         Objective   Physical Exam  Constitutional:       General: She is not in acute distress.     Appearance: Normal appearance.   HENT:      Head: Normocephalic and atraumatic.      Nose: Nose normal.      Mouth/Throat:      Mouth: Mucous membranes are dry.   Cardiovascular:      Rate and Rhythm: Normal rate.   Pulmonary:      Effort: Pulmonary effort is normal.   Abdominal:      General: Abdomen is flat.      Palpations: Abdomen is soft.   Musculoskeletal:         General: Normal range of motion.      Cervical back: Normal range of motion and neck supple.   Skin:     General: Skin is warm and dry.   Neurological:      General: No  focal deficit present.      Mental Status: She is alert and oriented to person, place, and time.   Psychiatric:         Mood and Affect: Mood normal.         Assessment/Plan   Problem List Items Addressed This Visit             ICD-10-CM    Lower urinary tract symptoms (LUTS) - Primary R39.9    Relevant Orders    POCT UA Automated manually resulted (Completed)    Urge incontinence of urine N39.41     Continue trospium 60 mg.  I advised continuing timed voiding to prevent urgency incontinence.  I discussed if needed will try Myrbetriq 25 mg.    I discussed third line treatment.  Patient is not a candidate for PTNS because she has a defibrillator.  I discussed Botox therapy.    Patient will let us know if she would like to proceed with Botox.    She will follow-up in 6 months or sooner as needed                 Bety Fischer PA-C 03/28/24 12:37 PM

## 2024-03-28 NOTE — ASSESSMENT & PLAN NOTE
Continue trospium 60 mg.  I advised continuing timed voiding to prevent urgency incontinence.  I discussed if needed will try Myrbetriq 25 mg.    I discussed third line treatment.  Patient is not a candidate for PTNS because she has a defibrillator.  I discussed Botox therapy.    Patient will let us know if she would like to proceed with Botox.    She will follow-up in 6 months or sooner as needed

## 2024-04-02 ENCOUNTER — TELEPHONE (OUTPATIENT)
Dept: PRIMARY CARE | Facility: CLINIC | Age: 69
End: 2024-04-02
Payer: MEDICARE

## 2024-04-04 ENCOUNTER — TELEPHONE (OUTPATIENT)
Dept: PRIMARY CARE | Facility: CLINIC | Age: 69
End: 2024-04-04
Payer: MEDICARE

## 2024-04-04 DIAGNOSIS — R09.89 CHEST CONGESTION: ICD-10-CM

## 2024-04-04 DIAGNOSIS — R32 URINARY INCONTINENCE, UNSPECIFIED TYPE: ICD-10-CM

## 2024-04-04 RX ORDER — TROSPIUM CHLORIDE ER 60 MG/1
60 CAPSULE ORAL DAILY
Qty: 30 CAPSULE | Refills: 1 | Status: SHIPPED | OUTPATIENT
Start: 2024-04-04 | End: 2024-05-04

## 2024-04-08 RX ORDER — ALBUTEROL SULFATE 90 UG/1
AEROSOL, METERED RESPIRATORY (INHALATION)
Qty: 8.5 G | Refills: 1 | Status: SHIPPED | OUTPATIENT
Start: 2024-04-08

## 2024-04-10 ENCOUNTER — HOSPITAL ENCOUNTER (OUTPATIENT)
Dept: RADIOLOGY | Facility: HOSPITAL | Age: 69
Discharge: HOME | End: 2024-04-10
Payer: MEDICARE

## 2024-04-10 VITALS
WEIGHT: 242 LBS | RESPIRATION RATE: 18 BRPM | BODY MASS INDEX: 37.98 KG/M2 | TEMPERATURE: 97.8 F | OXYGEN SATURATION: 99 % | DIASTOLIC BLOOD PRESSURE: 77 MMHG | HEART RATE: 75 BPM | SYSTOLIC BLOOD PRESSURE: 158 MMHG | HEIGHT: 67 IN

## 2024-04-10 DIAGNOSIS — M96.1 LUMBAR POST-LAMINECTOMY SYNDROME: ICD-10-CM

## 2024-04-10 PROCEDURE — 2720000007 HC OR 272 NO HCPCS

## 2024-04-10 PROCEDURE — 64483 NJX AA&/STRD TFRM EPI L/S 1: CPT | Performed by: ANESTHESIOLOGY

## 2024-04-10 PROCEDURE — 2500000004 HC RX 250 GENERAL PHARMACY W/ HCPCS (ALT 636 FOR OP/ED): Performed by: ANESTHESIOLOGY

## 2024-04-10 RX ORDER — MIDAZOLAM HYDROCHLORIDE 1 MG/ML
INJECTION INTRAMUSCULAR; INTRAVENOUS
Status: COMPLETED | OUTPATIENT
Start: 2024-04-10 | End: 2024-04-10

## 2024-04-10 RX ADMIN — MIDAZOLAM HYDROCHLORIDE 2 MG: 1 INJECTION INTRAMUSCULAR; INTRAVENOUS at 10:42

## 2024-04-10 ASSESSMENT — PAIN SCALES - GENERAL
PAINLEVEL_OUTOF10: 0 - NO PAIN
PAINLEVEL_OUTOF10: 8
PAINLEVEL_OUTOF10: 4
PAINLEVEL_OUTOF10: 3
PAINLEVEL_OUTOF10: 5 - MODERATE PAIN
PAINLEVEL_OUTOF10: 0 - NO PAIN

## 2024-04-10 ASSESSMENT — PAIN - FUNCTIONAL ASSESSMENT
PAIN_FUNCTIONAL_ASSESSMENT: 0-10

## 2024-04-10 ASSESSMENT — PAIN DESCRIPTION - DESCRIPTORS: DESCRIPTORS: ACHING

## 2024-04-10 NOTE — PROCEDURES
Preoperative diagnosis:  Lumbar radiculitis  Postoperative diagnosis:  Lumbar radiculitis, adjacent level disease, failed back surgery syndrome  Procedure: Bilateral L2 lumbar transforaminal epidural steroid injection under fluoroscopic guidance  Surgeon: Isabella Dockery  Assistant:  Fellow, Jose David Sharp  Anesthesia: Local plus IV sedation  Complications: Apparently none    Clinical note:  Jerri Ramirez is a 68-year-old female with history of back and leg symptoms, prior back surgery who presents today for an injection to target her adjacent level disease.    Procedure note: The patient was met in the preoperative holding area after risks benefits and alternatives to procedure were discussed with the patient, informed consent was obtained. Patient brought back to the procedure room and placed in the prone position on the fluoroscopy table. Area over the back was exposed, prepped, draped, in the usual sterile fashion.  Skin and subcutaneous tissues to the neuroforamen was anesthetized using 0.5% lidocaine.  22-gauge Sprotte needles were inserted in the skin and advanced into the foramen. Needle tip position was confirmed in AP oblique and lateral view.  Contrast was injected which showed appropriate epidural spread, no intravascular or intrathecal uptake. A total of 2 mL of 0.5% lidocaine mixed with 10 mg dexamethasone was injected in divided doses among the 2 needles. Needles removed, bandage applied, patient tolerated the procedure well with no immediate complications.

## 2024-04-10 NOTE — H&P
History Of Present Illness  Jerri Ramirez is a 68 y.o. female presents for procedure state below. Endorses no changes in past medical history or medical health since last seen in clinic.     Past Medical History  She has a past medical history of Abnormal finding of blood chemistry, unspecified (11/06/2019), Acute kidney failure, unspecified (CMS/HCC), Acute posthemorrhagic anemia, Adjustment disorder with depressed mood (11/13/2019), Atherosclerotic heart disease of native coronary artery without angina pectoris (11/16/2017), Bilateral primary osteoarthritis of knee, Body mass index (BMI)40.0-44.9, adult (02/12/2021), Contusion of left wrist, initial encounter (11/21/2019), Dermatitis, unspecified (01/12/2018), Encounter for follow-up examination after completed treatment for conditions other than malignant neoplasm (07/30/2021), Encounter for other preprocedural examination (05/20/2021), Encounter for other screening for malignant neoplasm of breast (10/30/2020), Encounter for other screening for malignant neoplasm of breast (04/20/2017), History of falling (11/20/2019), History of falling (11/13/2019), Hypomagnesemia, Myalgia, other site (10/26/2017), Nondisplaced transverse fracture of left patella, subsequent encounter for closed fracture with nonunion (10/28/2018), Obesity, unspecified (10/31/2017), Other hemorrhoids, Other injury of unspecified body region, initial encounter (01/08/2018), Other specified acquired deformities of right lower leg, Other specified symptoms and signs involving the circulatory and respiratory systems (02/12/2021), Overweight, Pain in unspecified hand (11/20/2019), Pain in unspecified hip (10/26/2017), Pain in unspecified knee (06/02/2016), Personal history of colonic polyps, Personal history of diseases of the blood and blood-forming organs and certain disorders involving the immune mechanism, Personal history of other benign neoplasm (06/22/2016), Personal history of other  diseases of the circulatory system, Personal history of other diseases of the circulatory system (10/10/2016), Personal history of other diseases of the digestive system (2019), Personal history of other diseases of the musculoskeletal system and connective tissue (2013), Personal history of other diseases of the musculoskeletal system and connective tissue (10/26/2017), Personal history of other diseases of the musculoskeletal system and connective tissue (2016), Personal history of other diseases of the musculoskeletal system and connective tissue (2021), Personal history of other endocrine, nutritional and metabolic disease (2019), Personal history of other endocrine, nutritional and metabolic disease, Personal history of other specified conditions (2019), Personal history of peptic ulcer disease, Presence of left artificial knee joint (2020), Radiculopathy, lumbar region (2019), Reaction to severe stress, unspecified (2019), Spondylolysis, cervical region, Unspecified symptoms and signs involving the genitourinary system (2018), Unspecified systolic (congestive) heart failure (CMS/HCC) (2021), and Urge incontinence (2016).    Surgical History  She has a past surgical history that includes Hip surgery (2013); Gallbladder surgery (2013);  section, classic (2013); Foot surgery (2013); Other surgical history (2020); Coronary angioplasty with stent (2017); Knee surgery (2017); and CT angio aorta and bilateral iliofemoral runoff w and or wo IV contrast (2017).     Social History  She reports that she has never smoked. She has never used smokeless tobacco. She reports current drug use. Drug: Marijuana. She reports that she does not drink alcohol.    Family History  Family History   Problem Relation Name Age of Onset    Coronary artery disease Sibling      Sudden death Sibling          sudden  cardiac death (SCD)    Throat cancer Sibling          Allergies  Lisinopril, Lisinopril-hydrochlorothiazide, and Tramadol    Review of Symptoms:   Constitutional: Negative for chills, diaphoresis or fever  HENT: Negative for neck swelling  Eyes:.  Negative for eye pain  Respiratory:.  Negative for cough, shortness of breath or wheezing    Cardiovascular:.  Negative for chest pain or palpitations  Gastrointestinal:.  Negative for abdominal pain, nausea and vomiting  Genitourinary:.  Negative for urgency  Musculoskeletal:  Positive for back pain. Positive for joint pain. Denies falls within the past 3 months.  Skin: Negative for wounds or itching   Neurological: Negative for dizziness, seizures, loss of consciousness and weakness  Endo/Heme/Allergies: Does not bruise/bleed easily  Psychiatric/Behavioral: Negative for depression. The patient does not appear anxious.       PHYSICAL EXAM  Vitals signs reviewed  Constitutional:       General: Not in acute distress     Appearance: Normal appearance. Not ill-appearing.  HENT:     Head: Normocephalic and atraumatic  Eyes:     Conjunctiva/sclera: Conjunctivae normal  Cardiovascular:     Rate and Rhythm: Normal rate and regular rhythm  Pulmonary:     Effort: No respiratory distress  Abdominal:     Palpations: Abdomen is soft  Musculoskeletal: BRINK  Skin:     General: Skin is warm and dry  Neurological:     General: No focal deficit present  Psychiatric:         Mood and Affect: Mood normal         Behavior: Behavior normal     Last Recorded Vitals  BP (!) 160/93   Pulse 80   Temp 36.7 °C (98.1 °F) (Temporal)   Resp 18   Wt 110 kg (242 lb)   SpO2 100%     Relevant Results  Current Outpatient Medications   Medication Instructions    albuterol 90 mcg/actuation inhaler USE 2 INHALATIONS EVERY 4 HOURS IF NEEDED FOR WHEEZING OR SHORTNESS OF BREATH    amLODIPine (NORVASC) 10 mg, oral, Daily    aspirin 81 mg EC tablet 1 tablet, oral, Daily    atorvastatin (Lipitor) 80 mg tablet  TAKE 1 TABLET AT BEDTIME    carvedilol (Coreg) 25 mg tablet TAKE 1 TABLET TWICE A DAY    dapagliflozin propanediol (FARXIGA) 10 mg, oral, Daily before breakfast    diclofenac sodium (VOLTAREN) 2 g, Topical, 4 times daily, Apply to upper extremities, to affected area. Do not apply more than 8 GM daily to any one affected joint    fluticasone (Flonase) 50 mcg/actuation nasal spray 2 sprays, Each Nostril, Daily    furosemide (LASIX) 20 mg, oral, Daily, As directed    gabapentin (Neurontin) 600 mg tablet 2 tablets, oral, 3 times daily    magnesium oxide (MAG-OX) 400 mg, oral, Daily    multivit-min-iron-FA-vit K-lut 4 mg iron-200 mcg-25 mcg tablet oral, Daily RT    multivit-min/iron/folic/lutein (MULTIVITAMIN WOMEN 50 PLUS ORAL) 1 tablet, oral, Daily, Multivitamin Women 50+ oral tablet    omeprazole (PRILOSEC) 20 mg, oral, Daily before breakfast    trospium (SANCTURA XR) 60 mg, oral, Daily         XR ENTIRE SPINE 2 OR 3 VIEWS 08/31/2021    Narrative  MRN: [id number]  Patient Name: DENAE RAMIREZ    STUDY:  SPINE, ENTIRE THORACIC/LUMBAR, INCLUDE SKULL, CERVICAL ANSD SACRAL  SPINE WHEN PERFORMED 2 OR 3 VIEW; ;  8/31/2021 2:50 pm    INDICATION:  Scoliosis.    COMPARISON:  None.    ACCESSION NUMBER(S):  [id number]    ORDERING CLINICIAN:  MOON QUINONEZ    FINDINGS:  Mild S shaped scoliotic curvature of the thoracolumbar spine.  Internal fixation posterior transpedicular fusion of the lower lumbar  spine. No acute or complication. No acute fracture. Background of  multilevel spondylosis. Left-sided abdominal wall pulse generator  device present. Stimulator lead terminating over the midline.    Impression  Mild S shaped scoliotic curvature of the thoracolumbar spine.    Background of multilevel spondylosis.     No image results found.       1. Lumbar post-laminectomy syndrome  FL pain management    FL pain management    Transforaminal    Transforaminal           ASSESSMENT/PLAN  Denae Ramirez is a 68 y.o. female presents for  Bilateral L2 transforaminal epidural steroid injection.      Our plan is as follows:  - Follow In pain clinic  - Continue to participate in physical therapy as well as physician directed home exercises  - Continue pain medications as prescribed       Ralph Khanna MD

## 2024-04-11 DIAGNOSIS — I25.10 CORONARY ARTERY DISEASE INVOLVING NATIVE CORONARY ARTERY OF NATIVE HEART WITHOUT ANGINA PECTORIS: ICD-10-CM

## 2024-04-11 DIAGNOSIS — I73.9 PAD (PERIPHERAL ARTERY DISEASE) (CMS-HCC): ICD-10-CM

## 2024-04-11 DIAGNOSIS — E78.5 HYPERLIPIDEMIA, UNSPECIFIED HYPERLIPIDEMIA TYPE: ICD-10-CM

## 2024-04-15 RX ORDER — ATORVASTATIN CALCIUM 80 MG/1
TABLET, FILM COATED ORAL
Qty: 90 TABLET | Refills: 3 | Status: SHIPPED | OUTPATIENT
Start: 2024-04-15

## 2024-04-29 DIAGNOSIS — Z95.810 ICD (IMPLANTABLE CARDIOVERTER-DEFIBRILLATOR) IN PLACE: ICD-10-CM

## 2024-04-29 DIAGNOSIS — R93.1 DECREASED CARDIAC EJECTION FRACTION: ICD-10-CM

## 2024-04-29 DIAGNOSIS — I50.9 HEART FAILURE, UNSPECIFIED HF CHRONICITY, UNSPECIFIED HEART FAILURE TYPE (MULTI): ICD-10-CM

## 2024-05-01 RX ORDER — CARVEDILOL 25 MG/1
TABLET ORAL
Qty: 180 TABLET | Refills: 3 | Status: SHIPPED | OUTPATIENT
Start: 2024-05-01

## 2024-05-06 NOTE — OP NOTE
SURGEON:  Cheng Dickinson MD    PREOPERATIVE DIAGNOSIS:    Left mid back subcutaneous mass.    POSTOPERATIVE DIAGNOSIS:    Left mid back subcutaneous lipoma.    PROCEDURE:    Excision of lipoma.    ANESTHESIA:    MAC.    INDICATIONS:    The patient is a 67-year-old female with a left mid back subcutaneous  mass.  The patient also has pain in the area.  I told the patient  preoperatively that the pain likely is unrelated to the mass.     DESCRIPTION OF PROCEDURE:    Following informed consent, the patient was taken to the operating  room, placed in the supine position.  A preoperative huddle was  performed.  She was then placed into the right lateral decubitus  position and given MAC anesthetic.  Sequential compression devices  were in place and functioning.  Her back was prepped and draped in  sterile fashion.  A time-out was performed.  Following infiltration  of local anesthetic, a transverse skin incision was made overlying  the palpable mass and the patient was found to have a subcutaneous  multi lobulated lipoma, which was completely freed from the  surrounding tissue.  The lipoma measured 10 x 7 cm in diameter and  was sent to Pathology.  Plain Marcaine 0.5% was infiltrated.  The  wound was irrigated with saline.  Hemostasis was obtained with  electrocautery and appeared to be excellent.  The area was palpated.  No additional masses were identified.  The wound was closed in layers  with interrupted 3-0 Vicryl subcutaneous sutures and a running 4-0  Vicryl subcuticular skin stitch.  A dressing was placed, and the  patient taken to recovery room in satisfactory condition, having  tolerated the procedure well.  Counts were correct.       Cheng Dickinson MD    DD:  04/04/2023 10:11:06 EST  DT:  04/04/2023 10:42:25 EST  DICTATION NUMBER:  [id number]  INTERNAL JOB NUMBER:  [id number]      cc:             Trina Floyd          Electronic Signatures:  Cheng Dickinson (MD) (Signed on 05-Apr-2023  06:16)   Authored  Unsigned, Draft (SYS GENERATED) (Entered on 04-Apr-2023 10:42)   Entered    Last Updated: 05-Apr-2023 06:16 by Cheng Dickinson)

## 2024-05-21 ENCOUNTER — HOSPITAL ENCOUNTER (OUTPATIENT)
Dept: CARDIOLOGY | Facility: CLINIC | Age: 69
Discharge: HOME | End: 2024-05-21
Payer: MEDICARE

## 2024-05-21 DIAGNOSIS — I47.20 VT (VENTRICULAR TACHYCARDIA) (MULTI): ICD-10-CM

## 2024-05-21 DIAGNOSIS — Z95.810 PRESENCE OF AUTOMATIC CARDIOVERTER/DEFIBRILLATOR (AICD): ICD-10-CM

## 2024-05-21 PROCEDURE — 93296 REM INTERROG EVL PM/IDS: CPT

## 2024-06-14 RX ORDER — GABAPENTIN 600 MG/1
1200 TABLET ORAL 3 TIMES DAILY
Qty: 90 TABLET | Refills: 3 | OUTPATIENT
Start: 2024-06-14

## 2024-06-14 NOTE — TELEPHONE ENCOUNTER
Has not been seen for over a year. Should request med refill from pain management as they are following her. thanks

## 2024-06-19 RX ORDER — GABAPENTIN 600 MG/1
1200 TABLET ORAL 3 TIMES DAILY
Qty: 90 TABLET | Refills: 1 | OUTPATIENT
Start: 2024-06-19

## 2024-06-25 DIAGNOSIS — M47.812 CERVICAL SPONDYLOSIS: ICD-10-CM

## 2024-06-25 RX ORDER — GABAPENTIN 600 MG/1
TABLET ORAL
Refills: 0 | OUTPATIENT
Start: 2024-06-25

## 2024-06-25 RX ORDER — GABAPENTIN 600 MG/1
1200 TABLET ORAL 3 TIMES DAILY
Qty: 180 TABLET | Refills: 6 | Status: SHIPPED | OUTPATIENT
Start: 2024-06-25

## 2024-07-09 ENCOUNTER — APPOINTMENT (OUTPATIENT)
Dept: ORTHOPEDIC SURGERY | Facility: CLINIC | Age: 69
End: 2024-07-09
Payer: MEDICARE

## 2024-07-09 ENCOUNTER — TELEPHONE (OUTPATIENT)
Dept: PRIMARY CARE | Facility: CLINIC | Age: 69
End: 2024-07-09
Payer: MEDICARE

## 2024-07-16 ENCOUNTER — OFFICE VISIT (OUTPATIENT)
Dept: PAIN MEDICINE | Facility: HOSPITAL | Age: 69
End: 2024-07-16
Payer: MEDICARE

## 2024-07-16 DIAGNOSIS — M96.1 LUMBAR POST-LAMINECTOMY SYNDROME: Primary | ICD-10-CM

## 2024-07-16 DIAGNOSIS — M48.062 SPINAL STENOSIS OF LUMBAR REGION WITH NEUROGENIC CLAUDICATION: ICD-10-CM

## 2024-07-16 DIAGNOSIS — N39.41 URGE INCONTINENCE OF URINE: Primary | ICD-10-CM

## 2024-07-16 PROCEDURE — 1123F ACP DISCUSS/DSCN MKR DOCD: CPT | Performed by: ANESTHESIOLOGY

## 2024-07-16 PROCEDURE — 99214 OFFICE O/P EST MOD 30 MIN: CPT | Performed by: ANESTHESIOLOGY

## 2024-07-16 PROCEDURE — 1125F AMNT PAIN NOTED PAIN PRSNT: CPT | Performed by: ANESTHESIOLOGY

## 2024-07-16 RX ORDER — TROSPIUM CHLORIDE ER 60 MG/1
60 CAPSULE ORAL DAILY
Qty: 90 CAPSULE | Refills: 3 | Status: SHIPPED | OUTPATIENT
Start: 2024-07-16 | End: 2025-07-16

## 2024-07-16 ASSESSMENT — PAIN SCALES - GENERAL: PAINLEVEL: 7

## 2024-07-16 NOTE — PROGRESS NOTES
Chief Complaint   Patient presents with    Back Pain    Extremity Pain     Subjective   Patient ID: Jerri Ramirez is a 68 y.o. female with a past medical history of  CAD, heart failure s/p defibrillator, peripheral arterial disease, osteopenia, and postlaminectomy syndrome (L3-S1 fusion 2015), now s/p bilateral L2 lumbar transforaminal epidural steroid injection on 4/10/24 who presents for a follow up. Patient reports that she had about 80% pain relief until she ran out of her gabapentin a few weeks ago. She states that she was able to participate in all of her therapy sessions while the DEE worked, which was a significant improvement in her functional status. She has since refilled her gabapentin and is taking it 600mg TID, but pain is still around a 6-7/10 (10 being the worst).     The patient notes that the combination of the epidural injection along with gabapentin was very helpful in treating her pain.    Review of Systems   13-point ROS done and negative except for HPI.     Current Outpatient Medications   Medication Instructions    albuterol 90 mcg/actuation inhaler USE 2 INHALATIONS EVERY 4 HOURS IF NEEDED FOR WHEEZING OR SHORTNESS OF BREATH    amLODIPine (NORVASC) 10 mg, oral, Daily    aspirin 81 mg EC tablet 1 tablet, oral, Daily    atorvastatin (Lipitor) 80 mg tablet TAKE 1 TABLET AT BEDTIME    carvedilol (Coreg) 25 mg tablet TAKE 1 TABLET TWICE A DAY    dapagliflozin propanediol (FARXIGA) 10 mg, oral, Daily before breakfast    diclofenac sodium (VOLTAREN) 2 g, Topical, 4 times daily, Apply to upper extremities, to affected area. Do not apply more than 8 GM daily to any one affected joint    fluticasone (Flonase) 50 mcg/actuation nasal spray 2 sprays, Each Nostril, Daily    furosemide (LASIX) 20 mg, oral, Daily, As directed    gabapentin (NEURONTIN) 1,200 mg, oral, 3 times daily    magnesium oxide (MAG-OX) 400 mg, oral, Daily    multivit-min-iron-FA-vit K-lut 4 mg iron-200 mcg-25 mcg tablet oral, Daily  RT    multivit-min/iron/folic/lutein (MULTIVITAMIN WOMEN 50 PLUS ORAL) 1 tablet, oral, Daily, Multivitamin Women 50+ oral tablet    omeprazole (PRILOSEC) 20 mg, oral, Daily before breakfast    trospium (SANCTURA XR) 60 mg, oral, Daily       Past Medical History:   Diagnosis Date    Abnormal finding of blood chemistry, unspecified 11/06/2019    Abnormal blood chemistry    Acute kidney failure, unspecified (CMS-HCC)     Acute kidney injury    Acute posthemorrhagic anemia     Postoperative anemia due to acute blood loss    Adjustment disorder with depressed mood 11/13/2019    Grief reaction    Atherosclerotic heart disease of native coronary artery without angina pectoris 11/16/2017    Coronary artery disease    Bilateral primary osteoarthritis of knee     Osteoarthritis of both knees    Body mass index (BMI)40.0-44.9, adult 02/12/2021    Body mass index (BMI) of 40.0 to 44.9 in adult    Contusion of left wrist, initial encounter 11/21/2019    Contusion of left wrist, initial encounter    Dermatitis, unspecified 01/12/2018    Dermatitis of external ear    Encounter for follow-up examination after completed treatment for conditions other than malignant neoplasm 07/30/2021    Status post orthopedic surgery, follow-up exam    Encounter for other preprocedural examination 05/20/2021    Pre-operative clearance    Encounter for other screening for malignant neoplasm of breast 10/30/2020    Screening for breast cancer    Encounter for other screening for malignant neoplasm of breast 04/20/2017    Breast cancer screening    History of falling 11/20/2019    H/O fall    History of falling 11/13/2019    H/O fall    Hypomagnesemia     Hypomagnesemia    Myalgia, other site 10/26/2017    Myofascial pain    Nondisplaced transverse fracture of left patella, subsequent encounter for closed fracture with nonunion 10/28/2018    Closed nondisplaced transverse fracture of left patella with nonunion, subsequent encounter    Obesity,  unspecified 10/31/2017    Obesity (BMI 30.0-34.9)    Other hemorrhoids     Internal hemorrhoid    Other injury of unspecified body region, initial encounter 01/08/2018    Hematoma    Other specified acquired deformities of right lower leg     Acquired genu recurvatum of right knee    Other specified symptoms and signs involving the circulatory and respiratory systems 02/12/2021    Chest congestion    Overweight     Overweight    Pain in unspecified hand 11/20/2019    Hand pain    Pain in unspecified hip 10/26/2017    Hip pain    Pain in unspecified knee 06/02/2016    Knee pain    Personal history of colonic polyps     History of colonic polyps    Personal history of diseases of the blood and blood-forming organs and certain disorders involving the immune mechanism     History of iron deficiency anemia    Personal history of other benign neoplasm 06/22/2016    History of other benign neoplasm    Personal history of other diseases of the circulatory system     History of cardiac disorder    Personal history of other diseases of the circulatory system 10/10/2016    History of hypertension    Personal history of other diseases of the digestive system 11/13/2019    History of acute pancreatitis    Personal history of other diseases of the musculoskeletal system and connective tissue 12/31/2013    History of neck pain    Personal history of other diseases of the musculoskeletal system and connective tissue 10/26/2017    History of low back pain    Personal history of other diseases of the musculoskeletal system and connective tissue 06/22/2016    History of temporomandibular joint disorder    Personal history of other diseases of the musculoskeletal system and connective tissue 09/20/2021    History of low back pain    Personal history of other endocrine, nutritional and metabolic disease 11/13/2019    History of hypokalemia    Personal history of other endocrine, nutritional and metabolic disease     History of  dehydration    Personal history of other specified conditions 2019    History of abdominal pain    Personal history of peptic ulcer disease     History of peptic ulcer    Presence of left artificial knee joint 2020    History of left knee replacement    Radiculopathy, lumbar region 2019    Lumbar radicular pain    Reaction to severe stress, unspecified 2019    Situational stress    Spondylolysis, cervical region     Cervical spondylolysis    Unspecified symptoms and signs involving the genitourinary system 2018    Lower urinary tract symptoms (LUTS)    Unspecified systolic (congestive) heart failure (Multi) 2021    NYHA class 3 heart failure with reduced ejection fraction    Urge incontinence 2016    Sensory urge incontinence        Past Surgical History:   Procedure Laterality Date     SECTION, CLASSIC  2013     Section    CORONARY ANGIOPLASTY WITH STENT PLACEMENT  2017    Cath Stent Placement    CT AORTA AND BILATERAL ILIOFEMORAL RUNOFF ANGIOGRAM W AND/OR WO IV CONTRAST  2017    CT AORTA AND BILATERAL ILIOFEMORAL RUNOFF ANGIOGRAM W AND/OR WO IV CONTRAST 2017 [facility]    FOOT SURGERY  2013    Foot Surgery    GALLBLADDER SURGERY  2013    Gallbladder Surgery    HIP SURGERY  2013    Hip Surgery    KNEE SURGERY  2017    Knee Surgery    OTHER SURGICAL HISTORY  2020    Shoulder replacement        Family History   Problem Relation Name Age of Onset    Coronary artery disease Sibling      Sudden death Sibling          sudden cardiac death (SCD)    Throat cancer Sibling          Allergies   Allergen Reactions    Lisinopril Swelling and Angioedema    Lisinopril-Hydrochlorothiazide Other    Tramadol Other     irritates pancreas        Objective     There were no vitals filed for this visit.     Physical Exam  General: NAD, well groomed, well nourished  Eyes: Non-icteric sclera, EOMI  Ears, Nose, Mouth, and  Throat: External ears and nose appear to be without deformity or rash. No lesions or masses noted. Hearing is grossly intact.   Neck: Trachea midline  Respiratory: Nonlabored breathing   Cardiovascular: no peripheral edema   Skin: No rashes or open lesions/ulcers identified on skin.    Back:   Palpation: Tenderness to palpation over lumbar paraspinous muscles.   Straight leg raise: does not reproduce their pain, bilaterally   APRIL Maneuver does not reproduce pain bilaterally  Facet Loading test: absent on lumbar spine    Neurologic:   Cranial nerves grossly intact.   Strength 5/5 and symmetric plantar/dorsiflexion   Sensation: Normal to light touch throughout, pinprick intact throughout.  DTRs:normal and symmetric throughout  Antoine: absent  Clonus: absent    Psychiatric: Alert, orientation to person, place, and time. Cooperative.    Imaging personally reviewed and independently interpreted.    Assessment/Plan   Jerri Ramirez is a 68 y.o. female with a past medical history of  CAD, heart failure s/p defibrillator, peripheral arterial disease, osteopenia, and postlaminectomy syndrome (L3-S1 fusion 2015), now s/p bilateral L2 lumbar transforaminal epidural steroid injection on 4/10/24 present for a follow up. Patient endorsed great relief that last more than 2 months. She would like to proceed with a repeat injection.    Plan:  -will schedule for bilateral L2 trans-foraminal epidural steroid injection.  We discussed the potential for use methylprednisolone which would hopefully give more extended and higher percentage relief.    -Encouraged to keep up with physical therapy and core strengthening.    -Will restart gabapentin to be titrated up slowly.  Side effect profile and risk reviewed.  If we stop this medication later the patient was advised that cannot be stopped abruptly but more so would have to be weaned down slowly.  Advised to call refill line for further refills.    The patient has failed treatment with  : Physical therapy , three or more classes of medications, alternative therapies, they are not a surgical candidate, they have failed surgery, have significant limitations in their sleep quality due to the pain, have significant limitations of their quality of life due to the pain, have significant impairments of their activities of daily living (ADLs) due to the pain, and the procedure is medically indicated    We discussed  the risks, benefits and alternatives of the procedure including but not limited to: , Neuritis/sunburn sensation, Lack of efficacy , Transiently worsening pain , Bleeding, Infection , and Nerve Damage    Follow up: After procedure    The patient was invited to contact us back anytime with any questions or concerns and follow-up with us in the office as needed.     There are no diagnoses linked to this encounter.    This note was generated with the aid of dictation software, there may be typos despite my attempts at proofreading.     Cherri Mares MD  Chronic Pain Medicine Fellow  [facility]

## 2024-07-23 DIAGNOSIS — E83.42 HYPOMAGNESEMIA: ICD-10-CM

## 2024-07-23 DIAGNOSIS — K59.00 CONSTIPATION, UNSPECIFIED CONSTIPATION TYPE: ICD-10-CM

## 2024-07-23 RX ORDER — MAGNESIUM OXIDE 400 MG
1 TABLET ORAL DAILY
Qty: 90 TABLET | Refills: 3 | Status: SHIPPED | OUTPATIENT
Start: 2024-07-23

## 2024-08-05 ENCOUNTER — APPOINTMENT (OUTPATIENT)
Facility: HOSPITAL | Age: 69
End: 2024-08-05
Payer: MEDICARE

## 2024-08-05 VITALS
DIASTOLIC BLOOD PRESSURE: 95 MMHG | BODY MASS INDEX: 38.45 KG/M2 | TEMPERATURE: 98.6 F | RESPIRATION RATE: 19 BRPM | SYSTOLIC BLOOD PRESSURE: 146 MMHG | WEIGHT: 245 LBS | HEART RATE: 77 BPM | HEIGHT: 67 IN | OXYGEN SATURATION: 100 %

## 2024-08-05 DIAGNOSIS — M48.062 SPINAL STENOSIS OF LUMBAR REGION WITH NEUROGENIC CLAUDICATION: ICD-10-CM

## 2024-08-05 PROCEDURE — 2720000007 HC OR 272 NO HCPCS

## 2024-08-05 PROCEDURE — 64483 NJX AA&/STRD TFRM EPI L/S 1: CPT | Performed by: ANESTHESIOLOGY

## 2024-08-05 RX ORDER — DEXAMETHASONE SODIUM PHOSPHATE 10 MG/ML
INJECTION INTRAMUSCULAR; INTRAVENOUS
Status: DISPENSED
Start: 2024-08-05 | End: 2024-08-05

## 2024-08-05 RX ORDER — LIDOCAINE HYDROCHLORIDE 5 MG/ML
INJECTION, SOLUTION INFILTRATION; INTRAVENOUS
Status: DISPENSED
Start: 2024-08-05 | End: 2024-08-05

## 2024-08-05 ASSESSMENT — COLUMBIA-SUICIDE SEVERITY RATING SCALE - C-SSRS
2. HAVE YOU ACTUALLY HAD ANY THOUGHTS OF KILLING YOURSELF?: NO
1. IN THE PAST MONTH, HAVE YOU WISHED YOU WERE DEAD OR WISHED YOU COULD GO TO SLEEP AND NOT WAKE UP?: NO
6. HAVE YOU EVER DONE ANYTHING, STARTED TO DO ANYTHING, OR PREPARED TO DO ANYTHING TO END YOUR LIFE?: NO

## 2024-08-05 ASSESSMENT — PAIN - FUNCTIONAL ASSESSMENT
PAIN_FUNCTIONAL_ASSESSMENT: 0-10

## 2024-08-05 ASSESSMENT — PAIN SCALES - GENERAL
PAINLEVEL_OUTOF10: 8

## 2024-08-05 NOTE — H&P
HISTORY AND PHYSICAL    History Of Present Illness  Jerri Ramirez is a 69 y.o. female presenting with chronic pain. Here for Bilateral lumbar transforaminal epidural steroid injections  she denies any recent antibiotic use or infections, she denies any blood thinner use (other than ASA 81mg, which she stopped 7 days ago), and she denies contrast or local anesthetic allergies     Past Medical History  Past Medical History:   Diagnosis Date    Abnormal finding of blood chemistry, unspecified 11/06/2019    Abnormal blood chemistry    Acute kidney failure, unspecified (CMS-HCC)     Acute kidney injury    Acute posthemorrhagic anemia     Postoperative anemia due to acute blood loss    Adjustment disorder with depressed mood 11/13/2019    Grief reaction    Atherosclerotic heart disease of native coronary artery without angina pectoris 11/16/2017    Coronary artery disease    Bilateral primary osteoarthritis of knee     Osteoarthritis of both knees    Body mass index (BMI)40.0-44.9, adult 02/12/2021    Body mass index (BMI) of 40.0 to 44.9 in adult    Contusion of left wrist, initial encounter 11/21/2019    Contusion of left wrist, initial encounter    Dermatitis, unspecified 01/12/2018    Dermatitis of external ear    Encounter for follow-up examination after completed treatment for conditions other than malignant neoplasm 07/30/2021    Status post orthopedic surgery, follow-up exam    Encounter for other preprocedural examination 05/20/2021    Pre-operative clearance    Encounter for other screening for malignant neoplasm of breast 10/30/2020    Screening for breast cancer    Encounter for other screening for malignant neoplasm of breast 04/20/2017    Breast cancer screening    History of falling 11/20/2019    H/O fall    History of falling 11/13/2019    H/O fall    Hypomagnesemia     Hypomagnesemia    Myalgia, other site 10/26/2017    Myofascial pain    Nondisplaced transverse fracture of left patella, subsequent  encounter for closed fracture with nonunion 10/28/2018    Closed nondisplaced transverse fracture of left patella with nonunion, subsequent encounter    Obesity, unspecified 10/31/2017    Obesity (BMI 30.0-34.9)    Other hemorrhoids     Internal hemorrhoid    Other injury of unspecified body region, initial encounter 01/08/2018    Hematoma    Other specified acquired deformities of right lower leg     Acquired genu recurvatum of right knee    Other specified symptoms and signs involving the circulatory and respiratory systems 02/12/2021    Chest congestion    Overweight     Overweight    Pain in unspecified hand 11/20/2019    Hand pain    Pain in unspecified hip 10/26/2017    Hip pain    Pain in unspecified knee 06/02/2016    Knee pain    Personal history of colonic polyps     History of colonic polyps    Personal history of diseases of the blood and blood-forming organs and certain disorders involving the immune mechanism     History of iron deficiency anemia    Personal history of other benign neoplasm 06/22/2016    History of other benign neoplasm    Personal history of other diseases of the circulatory system     History of cardiac disorder    Personal history of other diseases of the circulatory system 10/10/2016    History of hypertension    Personal history of other diseases of the digestive system 11/13/2019    History of acute pancreatitis    Personal history of other diseases of the musculoskeletal system and connective tissue 12/31/2013    History of neck pain    Personal history of other diseases of the musculoskeletal system and connective tissue 10/26/2017    History of low back pain    Personal history of other diseases of the musculoskeletal system and connective tissue 06/22/2016    History of temporomandibular joint disorder    Personal history of other diseases of the musculoskeletal system and connective tissue 09/20/2021    History of low back pain    Personal history of other endocrine,  nutritional and metabolic disease 2019    History of hypokalemia    Personal history of other endocrine, nutritional and metabolic disease     History of dehydration    Personal history of other specified conditions 2019    History of abdominal pain    Personal history of peptic ulcer disease     History of peptic ulcer    Presence of left artificial knee joint 2020    History of left knee replacement    Radiculopathy, lumbar region 2019    Lumbar radicular pain    Reaction to severe stress, unspecified 2019    Situational stress    Spondylolysis, cervical region     Cervical spondylolysis    Unspecified symptoms and signs involving the genitourinary system 2018    Lower urinary tract symptoms (LUTS)    Unspecified systolic (congestive) heart failure (Multi) 2021    NYHA class 3 heart failure with reduced ejection fraction    Urge incontinence 2016    Sensory urge incontinence       Surgical History  Past Surgical History:   Procedure Laterality Date     SECTION, CLASSIC  2013     Section    CORONARY ANGIOPLASTY WITH STENT PLACEMENT  2017    Cath Stent Placement    CT AORTA AND BILATERAL ILIOFEMORAL RUNOFF ANGIOGRAM W AND/OR WO IV CONTRAST  2017    CT AORTA AND BILATERAL ILIOFEMORAL RUNOFF ANGIOGRAM W AND/OR WO IV CONTRAST 2017 [facility]    FOOT SURGERY  2013    Foot Surgery    GALLBLADDER SURGERY  2013    Gallbladder Surgery    HIP SURGERY  2013    Hip Surgery    KNEE SURGERY  2017    Knee Surgery    OTHER SURGICAL HISTORY  2020    Shoulder replacement        Social History  She reports that she has never smoked. She has never used smokeless tobacco. She reports current drug use. Drug: Marijuana. She reports that she does not drink alcohol.    Family History  Family History   Problem Relation Name Age of Onset    Coronary artery disease Sibling      Sudden death Sibling          sudden  cardiac death (SCD)    Throat cancer Sibling          Allergies  Lisinopril, Lisinopril-hydrochlorothiazide, and Tramadol    Review of Systems   12 point ROS done and negative except for the above.   Physical Exam     General: NAD, well groomed, well nourished  Eyes: Non-icteric sclera, EOMI  Ears, Nose, Mouth, and Throat: External ears and nose appear to be without deformity or rash. No lesions or masses noted. Hearing is grossly intact.   Neck: Trachea midline  Respiratory: Nonlabored breathing   Cardiovascular: No peripheral edema   Skin: No rashes or open lesions/ulcers identified on skin.    Last Recorded Vitals  There were no vitals taken for this visit.    Relevant Results           Assessment/Plan       Risks, benefits, alternatives discussed. All questions answered to the best of my ability. Patient agrees to proceed.   -We will proceed with planned procedure        Cherri Mares MD  Chronic Pain Fellow  [facility]

## 2024-08-05 NOTE — PROCEDURES
Preoperative diagnosis:  Lumbar radiculitis  Postoperative diagnosis:  Lumbar radiculitis, adjacent level disease, stenosis  Procedure: Bilateral L2 lumbar transforaminal epidural steroid injection under fluoroscopic guidance  Surgeon: Isabella Dockery  Assistant:  Fellow, Suzan  Anesthesia: Local, IV sedation  Complications: Apparently none    Clinical note: Jerri Ramirez is a 69-year-old female who had a history of prior back surgery and has known adjacent level disease.  She presents today for a targeted injection.    Procedure note: The patient was met in the preoperative holding area after risks benefits and alternatives to procedure were discussed with the patient, informed consent was obtained. Patient brought back to the procedure room and she was able to to position herself into the prone position on the fluoroscopy table.  The area over the back was exposed, prepped, draped, in the usual sterile fashion.  Skin and subcutaneous tissues to the neuroforamen was anesthetized using 0.5% lidocaine.  22-gauge Sprotte needles were inserted in the skin and advanced into the foramen. Needle tip position was confirmed in AP oblique and lateral view.  Contrast was injected which showed appropriate epidural spread on the right side and the left-sided needle was slightly repositioned more ventrally to get appropriate epidural spread.  In the final position at each needle tip there is appropriate epidural spread and no intravascular or intrathecal uptake. A total of 2 mL of 0.5% lidocaine mixed with 10 mg dexamethasone was injected in divided doses among the 2 needles. Needles removed, bandage applied, patient tolerated the procedure well with no immediate complications.

## 2024-08-13 ENCOUNTER — HOSPITAL ENCOUNTER (OUTPATIENT)
Dept: RADIOLOGY | Facility: CLINIC | Age: 69
Discharge: HOME | End: 2024-08-13
Payer: MEDICARE

## 2024-08-13 ENCOUNTER — APPOINTMENT (OUTPATIENT)
Dept: PRIMARY CARE | Facility: CLINIC | Age: 69
End: 2024-08-13
Payer: MEDICARE

## 2024-08-13 ENCOUNTER — LAB (OUTPATIENT)
Dept: LAB | Facility: LAB | Age: 69
End: 2024-08-13
Payer: MEDICARE

## 2024-08-13 VITALS
HEIGHT: 67 IN | DIASTOLIC BLOOD PRESSURE: 70 MMHG | SYSTOLIC BLOOD PRESSURE: 126 MMHG | OXYGEN SATURATION: 96 % | RESPIRATION RATE: 15 BRPM | TEMPERATURE: 97.6 F | BODY MASS INDEX: 38.97 KG/M2 | HEART RATE: 77 BPM | WEIGHT: 248.3 LBS

## 2024-08-13 DIAGNOSIS — E11.9 DIABETES MELLITUS WITHOUT COMPLICATION (MULTI): Primary | ICD-10-CM

## 2024-08-13 DIAGNOSIS — Z11.59 ENCOUNTER FOR HEPATITIS C SCREENING TEST FOR LOW RISK PATIENT: ICD-10-CM

## 2024-08-13 DIAGNOSIS — Z13.9 ENCOUNTER FOR SCREENING INVOLVING SOCIAL DETERMINANTS OF HEALTH (SDOH): ICD-10-CM

## 2024-08-13 DIAGNOSIS — I50.9 CONGESTIVE HEART FAILURE, UNSPECIFIED HF CHRONICITY, UNSPECIFIED HEART FAILURE TYPE (MULTI): ICD-10-CM

## 2024-08-13 DIAGNOSIS — E11.9 DIABETES MELLITUS WITHOUT COMPLICATION (MULTI): ICD-10-CM

## 2024-08-13 DIAGNOSIS — K59.00 CONSTIPATION, UNSPECIFIED CONSTIPATION TYPE: ICD-10-CM

## 2024-08-13 DIAGNOSIS — E66.01 CLASS 2 SEVERE OBESITY WITH SERIOUS COMORBIDITY AND BODY MASS INDEX (BMI) OF 38.0 TO 38.9 IN ADULT, UNSPECIFIED OBESITY TYPE (MULTI): ICD-10-CM

## 2024-08-13 LAB
ANION GAP SERPL CALC-SCNC: 14 MMOL/L (ref 10–20)
BASOPHILS # BLD AUTO: 0.05 X10*3/UL (ref 0–0.1)
BASOPHILS NFR BLD AUTO: 0.6 %
BUN SERPL-MCNC: 13 MG/DL (ref 6–23)
CALCIUM SERPL-MCNC: 9.9 MG/DL (ref 8.6–10.6)
CHLORIDE SERPL-SCNC: 105 MMOL/L (ref 98–107)
CHOLEST SERPL-MCNC: 176 MG/DL (ref 0–199)
CHOLESTEROL/HDL RATIO: 3.4
CO2 SERPL-SCNC: 28 MMOL/L (ref 21–32)
CREAT SERPL-MCNC: 0.71 MG/DL (ref 0.5–1.05)
EGFRCR SERPLBLD CKD-EPI 2021: >90 ML/MIN/1.73M*2
EOSINOPHIL # BLD AUTO: 0.24 X10*3/UL (ref 0–0.7)
EOSINOPHIL NFR BLD AUTO: 3 %
ERYTHROCYTE [DISTWIDTH] IN BLOOD BY AUTOMATED COUNT: 14 % (ref 11.5–14.5)
EST. AVERAGE GLUCOSE BLD GHB EST-MCNC: 143 MG/DL
GLUCOSE SERPL-MCNC: 105 MG/DL (ref 74–99)
HBA1C MFR BLD: 6.6 %
HCT VFR BLD AUTO: 41.5 % (ref 36–46)
HCV AB SER QL: NONREACTIVE
HDLC SERPL-MCNC: 51.1 MG/DL
HGB BLD-MCNC: 12.8 G/DL (ref 12–16)
IMM GRANULOCYTES # BLD AUTO: 0.02 X10*3/UL (ref 0–0.7)
IMM GRANULOCYTES NFR BLD AUTO: 0.2 % (ref 0–0.9)
LDLC SERPL CALC-MCNC: 98 MG/DL
LYMPHOCYTES # BLD AUTO: 2.87 X10*3/UL (ref 1.2–4.8)
LYMPHOCYTES NFR BLD AUTO: 35.7 %
MCH RBC QN AUTO: 27.8 PG (ref 26–34)
MCHC RBC AUTO-ENTMCNC: 30.8 G/DL (ref 32–36)
MCV RBC AUTO: 90 FL (ref 80–100)
MONOCYTES # BLD AUTO: 0.32 X10*3/UL (ref 0.1–1)
MONOCYTES NFR BLD AUTO: 4 %
NEUTROPHILS # BLD AUTO: 4.54 X10*3/UL (ref 1.2–7.7)
NEUTROPHILS NFR BLD AUTO: 56.5 %
NON HDL CHOLESTEROL: 125 MG/DL (ref 0–149)
NRBC BLD-RTO: 0 /100 WBCS (ref 0–0)
PLATELET # BLD AUTO: 349 X10*3/UL (ref 150–450)
POTASSIUM SERPL-SCNC: 4.3 MMOL/L (ref 3.5–5.3)
RBC # BLD AUTO: 4.6 X10*6/UL (ref 4–5.2)
SODIUM SERPL-SCNC: 143 MMOL/L (ref 136–145)
TRIGL SERPL-MCNC: 134 MG/DL (ref 0–149)
VLDL: 27 MG/DL (ref 0–40)
WBC # BLD AUTO: 8 X10*3/UL (ref 4.4–11.3)

## 2024-08-13 PROCEDURE — 83036 HEMOGLOBIN GLYCOSYLATED A1C: CPT

## 2024-08-13 PROCEDURE — 3078F DIAST BP <80 MM HG: CPT | Performed by: FAMILY MEDICINE

## 2024-08-13 PROCEDURE — 3048F LDL-C <100 MG/DL: CPT | Performed by: FAMILY MEDICINE

## 2024-08-13 PROCEDURE — 86803 HEPATITIS C AB TEST: CPT

## 2024-08-13 PROCEDURE — 83880 ASSAY OF NATRIURETIC PEPTIDE: CPT

## 2024-08-13 PROCEDURE — 80061 LIPID PANEL: CPT

## 2024-08-13 PROCEDURE — 36415 COLL VENOUS BLD VENIPUNCTURE: CPT

## 2024-08-13 PROCEDURE — 1160F RVW MEDS BY RX/DR IN RCRD: CPT | Performed by: FAMILY MEDICINE

## 2024-08-13 PROCEDURE — 3074F SYST BP LT 130 MM HG: CPT | Performed by: FAMILY MEDICINE

## 2024-08-13 PROCEDURE — 1123F ACP DISCUSS/DSCN MKR DOCD: CPT | Performed by: FAMILY MEDICINE

## 2024-08-13 PROCEDURE — 80048 BASIC METABOLIC PNL TOTAL CA: CPT

## 2024-08-13 PROCEDURE — 1036F TOBACCO NON-USER: CPT | Performed by: FAMILY MEDICINE

## 2024-08-13 PROCEDURE — 71046 X-RAY EXAM CHEST 2 VIEWS: CPT

## 2024-08-13 PROCEDURE — 3008F BODY MASS INDEX DOCD: CPT | Performed by: FAMILY MEDICINE

## 2024-08-13 PROCEDURE — 99214 OFFICE O/P EST MOD 30 MIN: CPT | Performed by: FAMILY MEDICINE

## 2024-08-13 PROCEDURE — 1159F MED LIST DOCD IN RCRD: CPT | Performed by: FAMILY MEDICINE

## 2024-08-13 PROCEDURE — 85025 COMPLETE CBC W/AUTO DIFF WBC: CPT

## 2024-08-13 PROCEDURE — 3044F HG A1C LEVEL LT 7.0%: CPT | Performed by: FAMILY MEDICINE

## 2024-08-13 PROCEDURE — 71046 X-RAY EXAM CHEST 2 VIEWS: CPT | Performed by: RADIOLOGY

## 2024-08-13 RX ORDER — DOCUSATE SODIUM 100 MG/1
200 CAPSULE, LIQUID FILLED ORAL 2 TIMES DAILY
Qty: 360 CAPSULE | Refills: 0 | Status: SHIPPED | OUTPATIENT
Start: 2024-08-13

## 2024-08-13 SDOH — SOCIAL STABILITY: SOCIAL NETWORK: HOW OFTEN DO YOU ATTENT MEETINGS OF THE CLUB OR ORGANIZATION YOU BELONG TO?: MORE THAN 4 TIMES PER YEAR

## 2024-08-13 SDOH — SOCIAL STABILITY: SOCIAL NETWORK
DO YOU BELONG TO ANY CLUBS OR ORGANIZATIONS SUCH AS CHURCH GROUPS UNIONS, FRATERNAL OR ATHLETIC GROUPS, OR SCHOOL GROUPS?: YES

## 2024-08-13 SDOH — SOCIAL STABILITY: SOCIAL INSECURITY
WITHIN THE LAST YEAR, HAVE YOU BEEN KICKED, HIT, SLAPPED, OR OTHERWISE PHYSICALLY HURT BY YOUR PARTNER OR EX-PARTNER?: NO

## 2024-08-13 SDOH — SOCIAL STABILITY: SOCIAL INSECURITY
WITHIN THE LAST YEAR, HAVE TO BEEN RAPED OR FORCED TO HAVE ANY KIND OF SEXUAL ACTIVITY BY YOUR PARTNER OR EX-PARTNER?: NO

## 2024-08-13 SDOH — SOCIAL STABILITY: SOCIAL INSECURITY: WITHIN THE LAST YEAR, HAVE YOU BEEN HUMILIATED OR EMOTIONALLY ABUSED IN OTHER WAYS BY YOUR PARTNER OR EX-PARTNER?: NO

## 2024-08-13 SDOH — ECONOMIC STABILITY: TRANSPORTATION INSECURITY
IN THE PAST 12 MONTHS, HAS LACK OF TRANSPORTATION KEPT YOU FROM MEETINGS, WORK, OR FROM GETTING THINGS NEEDED FOR DAILY LIVING?: YES

## 2024-08-13 SDOH — SOCIAL STABILITY: SOCIAL INSECURITY: WITHIN THE LAST YEAR, HAVE YOU BEEN AFRAID OF YOUR PARTNER OR EX-PARTNER?: NO

## 2024-08-13 SDOH — SOCIAL STABILITY: SOCIAL NETWORK: ARE YOU MARRIED, WIDOWED, DIVORCED, SEPARATED, NEVER MARRIED, OR LIVING WITH A PARTNER?: WIDOWED

## 2024-08-13 SDOH — HEALTH STABILITY: MENTAL HEALTH: HOW OFTEN DO YOU HAVE SIX OR MORE DRINKS ON ONE OCCASION?: NEVER

## 2024-08-13 SDOH — ECONOMIC STABILITY: FOOD INSECURITY: WITHIN THE PAST 12 MONTHS, YOU WORRIED THAT YOUR FOOD WOULD RUN OUT BEFORE YOU GOT MONEY TO BUY MORE.: OFTEN TRUE

## 2024-08-13 SDOH — ECONOMIC STABILITY: INCOME INSECURITY: HOW HARD IS IT FOR YOU TO PAY FOR THE VERY BASICS LIKE FOOD, HOUSING, MEDICAL CARE, AND HEATING?: VERY HARD

## 2024-08-13 SDOH — ECONOMIC STABILITY: FOOD INSECURITY: WITHIN THE PAST 12 MONTHS, THE FOOD YOU BOUGHT JUST DIDN'T LAST AND YOU DIDN'T HAVE MONEY TO GET MORE.: OFTEN TRUE

## 2024-08-13 SDOH — ECONOMIC STABILITY: INCOME INSECURITY: IN THE LAST 12 MONTHS, WAS THERE A TIME WHEN YOU WERE NOT ABLE TO PAY THE MORTGAGE OR RENT ON TIME?: YES

## 2024-08-13 SDOH — SOCIAL STABILITY: SOCIAL NETWORK: HOW OFTEN DO YOU ATTEND CHURCH OR RELIGIOUS SERVICES?: MORE THAN 4 TIMES PER YEAR

## 2024-08-13 SDOH — HEALTH STABILITY: MENTAL HEALTH: HOW OFTEN DO YOU HAVE A DRINK CONTAINING ALCOHOL?: NEVER

## 2024-08-13 SDOH — ECONOMIC STABILITY: HOUSING INSECURITY: AT ANY TIME IN THE PAST 12 MONTHS, WERE YOU HOMELESS OR LIVING IN A SHELTER (INCLUDING NOW)?: NO

## 2024-08-13 SDOH — ECONOMIC STABILITY: INCOME INSECURITY: IN THE PAST 12 MONTHS, HAS THE ELECTRIC, GAS, OIL, OR WATER COMPANY THREATENED TO SHUT OFF SERVICE IN YOUR HOME?: YES

## 2024-08-13 SDOH — HEALTH STABILITY: MENTAL HEALTH: HOW MANY STANDARD DRINKS CONTAINING ALCOHOL DO YOU HAVE ON A TYPICAL DAY?: PATIENT DOES NOT DRINK

## 2024-08-13 SDOH — ECONOMIC STABILITY: GENERAL: IN THE PAST 12 MONTHS HAS THE ELECTRIC, GAS, OIL, OR WATER COMPANY THREATENED TO SHUT OFF SERVICES IN YOUR HOME?: NO

## 2024-08-13 SDOH — SOCIAL STABILITY: SOCIAL INSECURITY: ARE YOU MARRIED, WIDOWED, DIVORCED, SEPARATED, NEVER MARRIED, OR LIVING WITH A PARTNER?: WIDOWED

## 2024-08-13 SDOH — ECONOMIC STABILITY: TRANSPORTATION INSECURITY
IN THE PAST 12 MONTHS, HAS THE LACK OF TRANSPORTATION KEPT YOU FROM MEDICAL APPOINTMENTS OR FROM GETTING MEDICATIONS?: YES

## 2024-08-13 SDOH — HEALTH STABILITY: MENTAL HEALTH
STRESS IS WHEN SOMEONE FEELS TENSE, NERVOUS, ANXIOUS, OR CAN'T SLEEP AT NIGHT BECAUSE THEIR MIND IS TROUBLED. HOW STRESSED ARE YOU?: RATHER MUCH

## 2024-08-13 SDOH — ECONOMIC STABILITY: INCOME INSECURITY: IN THE LAST 12 MONTHS, WAS THERE A TIME WHEN YOU WERE NOT ABLE TO PAY THE MORTGAGE OR RENT ON TIME?: NO

## 2024-08-13 SDOH — SOCIAL STABILITY: SOCIAL NETWORK: HOW OFTEN DO YOU GET TOGETHER WITH FRIENDS OR RELATIVES?: MORE THAN THREE TIMES A WEEK

## 2024-08-13 SDOH — HEALTH STABILITY: PHYSICAL HEALTH: ON AVERAGE, HOW MANY DAYS PER WEEK DO YOU ENGAGE IN MODERATE TO STRENUOUS EXERCISE (LIKE A BRISK WALK)?: 7 DAYS

## 2024-08-13 SDOH — HEALTH STABILITY: MENTAL HEALTH
DO YOU FEEL STRESS - TENSE, RESTLESS, NERVOUS, OR ANXIOUS, OR UNABLE TO SLEEP AT NIGHT BECAUSE YOUR MIND IS TROUBLED ALL THE TIME - THESE DAYS?: RATHER MUCH

## 2024-08-13 SDOH — SOCIAL STABILITY: SOCIAL NETWORK
IN A TYPICAL WEEK, HOW MANY TIMES DO YOU TALK ON THE PHONE WITH FAMILY, FRIENDS, OR NEIGHBORS?: MORE THAN THREE TIMES A WEEK

## 2024-08-13 SDOH — SOCIAL STABILITY: SOCIAL INSECURITY
WITHIN THE LAST YEAR, HAVE YOU BEEN RAPED OR FORCED TO HAVE ANY KIND OF SEXUAL ACTIVITY BY YOUR PARTNER OR EX-PARTNER?: NO

## 2024-08-13 SDOH — ECONOMIC STABILITY: FOOD INSECURITY: WITHIN THE PAST 12 MONTHS, YOU WORRIED THAT YOUR FOOD WOULD RUN OUT BEFORE YOU GOT THE MONEY TO BUY MORE.: OFTEN TRUE

## 2024-08-13 SDOH — HEALTH STABILITY: PHYSICAL HEALTH: ON AVERAGE, HOW MANY MINUTES DO YOU ENGAGE IN EXERCISE AT THIS LEVEL?: 60 MIN

## 2024-08-13 SDOH — ECONOMIC STABILITY: HOUSING INSECURITY: IN THE PAST 12 MONTHS, HOW MANY TIMES HAVE YOU MOVED WHERE YOU WERE LIVING?: 1

## 2024-08-13 SDOH — HEALTH STABILITY: MENTAL HEALTH: HOW MANY DRINKS CONTAINING ALCOHOL DO YOU HAVE ON A TYPICAL DAY WHEN YOU ARE DRINKING?: PATIENT DOES NOT DRINK

## 2024-08-13 SDOH — HEALTH STABILITY: MENTAL HEALTH: HOW OFTEN DO YOU HAVE 6 OR MORE DRINKS ON ONE OCCASION?: NEVER

## 2024-08-13 SDOH — SOCIAL STABILITY: SOCIAL NETWORK: HOW OFTEN DO YOU ATTEND MEETINGS OF THE CLUBS OR ORGANIZATIONS YOU BELONG TO?: MORE THAN 4 TIMES PER YEAR

## 2024-08-13 SDOH — ECONOMIC STABILITY: HOUSING INSECURITY: IN THE LAST 12 MONTHS, WAS THERE A TIME WHEN YOU WERE NOT ABLE TO PAY THE MORTGAGE OR RENT ON TIME?: YES

## 2024-08-13 SDOH — ECONOMIC STABILITY: GENERAL: HOW HARD IS IT FOR YOU TO PAY FOR THE VERY BASICS LIKE FOOD, HOUSING, MEDICAL CARE, AND HEATING?: VERY HARD

## 2024-08-13 SDOH — SOCIAL STABILITY: SOCIAL NETWORK
DO YOU BELONG TO ANY CLUBS OR ORGANIZATIONS SUCH AS CHURCH GROUPS, UNIONS, FRATERNAL OR ATHLETIC GROUPS, OR SCHOOL GROUPS?: YES

## 2024-08-13 SDOH — HEALTH STABILITY: MENTAL HEALTH: ON AVERAGE, HOW MANY MINUTES DO YOU ENGAGE IN EXERCISE AT THIS LEVEL?: 60 MIN

## 2024-08-13 ASSESSMENT — LIFESTYLE VARIABLES
AUDIT-C TOTAL SCORE: 0
SKIP TO QUESTIONS 9-10: 1
AUDIT-C TOTAL SCORE: 0
AUDIT-C TOTAL SCORE: 0

## 2024-08-13 ASSESSMENT — ACTIVITIES OF DAILY LIVING (ADL): LACK_OF_TRANSPORTATION: YES

## 2024-08-13 NOTE — PATIENT INSTRUCTIONS
Labs today  Chest xray    Pls call and follow up with cardio  Call for mammogram    Be on the look out for a call from the [facility] pharm and cinema program to give you a call      Please follow up in 3 month and as scheduled in FEB for [facility] or as needed.             Please follow up in 3 month and as scheduled in FEB for [facility] or as needed.       ** If labs or imaging ordered at today's visit, all the non-urgent results will be discussed at your next visit    If you have been referred for a special test or to a specialist please call  [phone] to schedule an appointment.  If you have any further questions, or if develop new or worsened symptoms, please give our office a call at [phone].

## 2024-08-13 NOTE — PROGRESS NOTES
CHW spoke with pt concerning her [facility] referral for transportation and food assistance. CHW gave pt the numbers to [employer] @[phone]. Pt was also given [place] to go for seniors [phone]. [facility] transportation services [phone]. CHW spoke with Dr. Floyd concerning the food for life program. Pt given the [employer] [phone] and [facility] [phone]. Pt will phone [facility] if she need further assistance.

## 2024-08-13 NOTE — PROGRESS NOTES
"Subjective   Patient ID: Jerri Ramirez is a 69 y.o. female who presents for Follow-up (Pt is here for HTN fuv./Pt states when she takes a breath and breathe out she here whistling.).    HPI     Symptoms of congestion  Can now hear \"rattling\" and \"crackles\"of the lung- this started 2wks ago after shingrix vaccination  No fever/ chills, No SOB/Cough, states feeling congestion in the lung  Only getting at night  Getting PT and aqua therapy  Hx of CHF- past due for cardio  Has swelling in the left leg- always present    Review of Systems  All systems reviewed and neg if not noted in the HPI above     Objective   /70   Pulse 77   Temp 36.4 °C (97.6 °F)   Resp 15   Ht 1.702 m (5' 7\")   Wt 113 kg (248 lb 4.8 oz)   SpO2 96%   BMI 38.89 kg/m²     Physical Exam  Pleasant  Eyes: conjunctiva non-icteric and eye lids are without obvious rash or drooping. Pupils are symmetric.   Ears, Nose, Mouth, and Throat: External ears and nose appear to be without deformity or rash. No lesions or masses noted. Hearing is grossly intact.   CV: RRR, no murmur  Pulm:CTA B/L  LE:+ RLL edema  Psychiatric: Alert, orientation to person, place, and time. Recent/remote memory as evidenced through face-to-face interaction and discussion appear grossly intact. Mood and affect are normal.        Assessment/Plan   Problem List Items Addressed This Visit             ICD-10-CM    Constipation K59.00    Relevant Medications    docusate sodium (Colace) 100 mg capsule    Diabetes mellitus without complication (Multi) - Primary E11.9     Hba1c 6.8 <--- 6.2  Rechecking today  Be on the look out for a call from the [facility]           Relevant Orders    Follow Up In [facility] - Pharmacy    Hemoglobin A1C    Lipid Panel    Basic metabolic panel    Albumin-Creatinine Ratio, Urine Random    Referral to [facility]     Other Visit Diagnoses         Codes    Class 2 severe obesity with serious comorbidity and body mass index (BMI) of 38.0 " to 38.9 in adult, unspecified obesity type (Multi)     E66.01, Z68.38    Congestive heart failure, unspecified HF chronicity, unspecified heart failure type (Multi)     I50.9    Relevant Orders    Follow Up In [facility] - Pharmacy    Basic metabolic panel    XR chest 2 views    B-type natriuretic peptide    CBC and Auto Differential    Referral to [facility]    Encounter for hepatitis C screening test for low risk patient     Z11.59    Relevant Orders    Hepatitis C antibody    Encounter for screening involving social determinants of health (SDoH)     Z13.9    Relevant Orders    Referral to Community Health Worker          Please follow up in 3 month and as scheduled in FEB for [facility] or as needed.

## 2024-08-14 LAB — BNP SERPL-MCNC: 126 PG/ML (ref 0–99)

## 2024-08-29 ENCOUNTER — CLINICAL SUPPORT (OUTPATIENT)
Dept: NUTRITION | Facility: CLINIC | Age: 69
End: 2024-08-29
Payer: MEDICARE

## 2024-08-29 NOTE — PROGRESS NOTES
Food For Life  Diet Recommendation 1: Heart Healthy  Diet Recommendation 2: Healthy Eating  Food Intolerance Avoidance: NKFA  Household Size: 2 Family Members  Interventions: Referral Number: 1st 6 Mo Referral 6 Mos  Interventions: Visit Number: 1 of 6 Visits - Max 6 Visits/Referral Each 6 Mo Period  Grains: 25-50% Whole  Fruit: 50-75% Fresh  Vegetables: % Fresh  Proteins: 1-2 Plant-based Items  Dairy: Lowfat - 100%  Originating Site of Referral Order: [facility]  Initials of RD Assisting Today: CHARLES

## 2024-08-30 ENCOUNTER — LAB REQUISITION (OUTPATIENT)
Dept: LAB | Facility: HOSPITAL | Age: 69
End: 2024-08-30
Payer: MEDICARE

## 2024-08-30 DIAGNOSIS — E11.9 TYPE 2 DIABETES MELLITUS WITHOUT COMPLICATIONS (MULTI): ICD-10-CM

## 2024-08-30 LAB
CREAT UR-MCNC: 48.9 MG/DL (ref 20–320)
MICROALBUMIN UR-MCNC: 38.3 MG/L
MICROALBUMIN/CREAT UR: 78.3 UG/MG CREAT

## 2024-08-30 PROCEDURE — 82570 ASSAY OF URINE CREATININE: CPT

## 2024-08-30 PROCEDURE — 82043 UR ALBUMIN QUANTITATIVE: CPT

## 2024-09-06 ENCOUNTER — APPOINTMENT (OUTPATIENT)
Dept: PHARMACY | Facility: HOSPITAL | Age: 69
End: 2024-09-06
Payer: MEDICARE

## 2024-09-06 DIAGNOSIS — I50.9 CONGESTIVE HEART FAILURE, UNSPECIFIED HF CHRONICITY, UNSPECIFIED HEART FAILURE TYPE (MULTI): ICD-10-CM

## 2024-09-06 DIAGNOSIS — E11.9 DIABETES MELLITUS WITHOUT COMPLICATION (MULTI): ICD-10-CM

## 2024-09-06 NOTE — ASSESSMENT & PLAN NOTE
CHF ASSESSMENT     Symptom/Staging:  -Most recent ejection fraction: 30-35%   -NYHA Stage: 3      Guideline-Directed Medical Therapy:  -ARNI: No   -If no, then ACEi/ARB?: No  -Beta Blocker: Yes, describe: Carvedilol 25 mg  -MRA: No - previously on Spironolactone-  low BP  -SGLT2i: Yes, describe: Farxiga 10 mg    Secondary Prevention:  -The ASCVD Risk score (Memo LOVETT, et al., 2019) failed to calculate for the following reasons:    The patient has a prior MI or stroke diagnosis

## 2024-09-06 NOTE — PROGRESS NOTES
Clinical Pharmacy Appointment  Subjective     Patient ID: Jerri Ramirez is a 69 y.o. female who presents for Diabetes.    Referring Provider: Trina Floyd DO     Diabetes  She presents for her initial diabetic visit. She has type 2 diabetes mellitus. Her disease course has been stable. She is compliant with treatment all of the time.     Referred primarily for cost assistance on Farxiga    Allergies   Allergen Reactions    Lisinopril Swelling and Angioedema    Lisinopril-Hydrochlorothiazide Other    Tramadol Other     irritates pancreas       Objective     Current DM Pharmacotherapy:   Farxiga 10 mg - 1 tablet daily     Clarifications to above regimen: None  Adverse Effects: Dry Mouth    SECONDARY PREVENTION  - Statin? Yes, Atorvastatin 80 mg  - ACEi/ARB? No, Allergy  - Aspirin? Yes      Pertinent PMH Review:  - PMH of Urinary Tract Infections/Yeast Infections: No    Lab Review  BMP  Lab Results   Component Value Date    CALCIUM 9.9 08/13/2024     08/13/2024    K 4.3 08/13/2024    CO2 28 08/13/2024     08/13/2024    BUN 13 08/13/2024    CREATININE 0.71 08/13/2024    EGFR >90 08/13/2024     LFTs  Lab Results   Component Value Date    ALT 16 02/07/2024    AST 17 02/07/2024    ALKPHOS 119 02/07/2024    BILITOT 0.4 02/07/2024     FLP  Lab Results   Component Value Date    TRIG 134 08/13/2024    CHOL 176 08/13/2024    LDLF 75 02/22/2023    LDLCALC 98 08/13/2024    HDL 51.1 08/13/2024       The ASCVD Risk score (Memo DK, et al., 2019) failed to calculate for the following reasons:    The patient has a prior MI or stroke diagnosis  Urine Microalbumin  Lab Results   Component Value Date    MICROALBCREA 78.3 (H) 08/30/2024     Weight Management  Wt Readings from Last 3 Encounters:   08/13/24 113 kg (248 lb 4.8 oz)   08/05/24 111 kg (245 lb)   04/10/24 110 kg (242 lb)      There is no height or weight on file to calculate BMI.   A1C  Lab Results   Component Value Date    HGBA1C 6.6 (H) 08/13/2024     HGBA1C 6.8 (H) 02/07/2024    HGBA1C 6.2 (A) 02/22/2023         Assessment/Plan     Problem List Items Addressed This Visit       Congestive heart failure (Multi)     CHF ASSESSMENT     Symptom/Staging:  -Most recent ejection fraction: 30-35%   -NYHA Stage: 3      Guideline-Directed Medical Therapy:  -ARNI: No   -If no, then ACEi/ARB?: No  -Beta Blocker: Yes, describe: Carvedilol 25 mg  -MRA: No - previously on Spironolactone-  low BP  -SGLT2i: Yes, describe: Farxiga 10 mg    Secondary Prevention:  -The ASCVD Risk score (Memo LOVETT, et al., 2019) failed to calculate for the following reasons:    The patient has a prior MI or stroke diagnosis            Diabetes mellitus without complication (Multi)     A1C at goal, continue current therapy           Patient Assistance Screening (VAF)    Patient verbally reports monthly or yearly income which is less than 400% federal poverty level    Application for program will be submitted for the following medications: Farxiga    Patient aware that they will need to provide appropriate proof of income documents: Federal 1040 - e-mail sent to patient     Patient aware this process may take up to 2-4 weeks from time of submission and application will be submitted upon receipt of income documents     If approved, medication must be filled through [facility] pharmacy and may be picked up or mailed to patient. If approved, medication will be billed through insurance, and patient assistance team will pay the copay. This will result in a $0 copay for the patient.      Follow up: I recommend diabetes care be 2 weeks.    Elidia Guillory PharmD [facility]  Clinical Pharmacy Specialist, Primary Care     Continue all meds under the continuation of care with the referring provider and clinical pharmacy team

## 2024-09-20 ENCOUNTER — APPOINTMENT (OUTPATIENT)
Dept: PHARMACY | Facility: HOSPITAL | Age: 69
End: 2024-09-20
Payer: MEDICARE

## 2024-09-20 DIAGNOSIS — I50.9 CONGESTIVE HEART FAILURE, UNSPECIFIED HF CHRONICITY, UNSPECIFIED HEART FAILURE TYPE: ICD-10-CM

## 2024-09-20 DIAGNOSIS — E11.9 DIABETES MELLITUS WITHOUT COMPLICATION (MULTI): ICD-10-CM

## 2024-09-20 NOTE — PROGRESS NOTES
Clinical Pharmacy Appointment  Subjective     Patient ID: Jerri Ramirez is a 69 y.o. female who presents for Diabetes.    Referring Provider: Trina Floyd DO     Diabetes  She presents for her initial diabetic visit. She has type 2 diabetes mellitus. Her disease course has been stable. She is compliant with treatment all of the time.     Referred primarily for cost assistance on Farxiga    Allergies   Allergen Reactions    Lisinopril Swelling and Angioedema    Lisinopril-Hydrochlorothiazide Other    Tramadol Other     irritates pancreas       Objective     Current DM Pharmacotherapy:   Farxiga 10 mg - 1 tablet daily     Clarifications to above regimen: None  Adverse Effects: Dry Mouth    SECONDARY PREVENTION  - Statin? Yes, Atorvastatin 80 mg  - ACEi/ARB? No, Allergy  - Aspirin? Yes      Pertinent PMH Review:  - PMH of Urinary Tract Infections/Yeast Infections: No    Lab Review  BMP  Lab Results   Component Value Date    CALCIUM 9.9 08/13/2024     08/13/2024    K 4.3 08/13/2024    CO2 28 08/13/2024     08/13/2024    BUN 13 08/13/2024    CREATININE 0.71 08/13/2024    EGFR >90 08/13/2024     LFTs  Lab Results   Component Value Date    ALT 16 02/07/2024    AST 17 02/07/2024    ALKPHOS 119 02/07/2024    BILITOT 0.4 02/07/2024     FLP  Lab Results   Component Value Date    TRIG 134 08/13/2024    CHOL 176 08/13/2024    LDLF 75 02/22/2023    LDLCALC 98 08/13/2024    HDL 51.1 08/13/2024       The ASCVD Risk score (Memo DK, et al., 2019) failed to calculate for the following reasons:    The patient has a prior MI or stroke diagnosis  Urine Microalbumin  Lab Results   Component Value Date    MICROALBCREA 78.3 (H) 08/30/2024     Weight Management  Wt Readings from Last 3 Encounters:   08/13/24 113 kg (248 lb 4.8 oz)   08/05/24 111 kg (245 lb)   04/10/24 110 kg (242 lb)      There is no height or weight on file to calculate BMI.   A1C  Lab Results   Component Value Date    HGBA1C 6.6 (H) 08/13/2024     HGBA1C 6.8 (H) 02/07/2024    HGBA1C 6.2 (A) 02/22/2023       Assessment/Plan     Problem List Items Addressed This Visit    None    Type 2 diabetes mellitus:  Patient reported no concerns, she has collected her tax forms to be sent in, she has been under the weather and wasn't able to get them in earlier. A1c is at goal.    CONTINUE  Farxiga 10 mg - 1 tablet daily      Patient Assistance Screening (VAF)    Patient verbally reports monthly or yearly income which is less than 400% federal poverty level    Application for program will be submitted for the following medications: Farxiga    Patient aware that they will need to provide appropriate proof of income documents: Federal 2740 - e-mail sent to patient (RESENT 9/20)    Patient aware this process may take up to 2-4 weeks from time of submission and application will be submitted upon receipt of income documents     If approved, medication must be filled through [facility] pharmacy and may be picked up or mailed to patient. If approved, medication will be billed through insurance, and patient assistance team will pay the copay. This will result in a $0 copay for the patient.    Follow up: I recommend diabetes care be 2 weeks. 10/4 @10:30 AM   Email back the tax forms to the email that was resent today to process the PAP.    RABIA Barrios Pharmacy Student  Preceptor: Elidia Guillory PharmD [facility]  Clinical Pharmacy Specialist, Primary Care     Continue all meds under the continuation of care with the referring provider and clinical pharmacy team

## 2024-09-23 DIAGNOSIS — K21.9 GASTROESOPHAGEAL REFLUX DISEASE, UNSPECIFIED WHETHER ESOPHAGITIS PRESENT: ICD-10-CM

## 2024-09-26 ENCOUNTER — APPOINTMENT (OUTPATIENT)
Dept: NUTRITION | Facility: CLINIC | Age: 69
End: 2024-09-26
Payer: MEDICARE

## 2024-09-26 RX ORDER — OMEPRAZOLE 20 MG/1
20 CAPSULE, DELAYED RELEASE ORAL
Qty: 90 CAPSULE | Refills: 3 | Status: SHIPPED | OUTPATIENT
Start: 2024-09-26

## 2024-09-27 ENCOUNTER — TELEPHONE (OUTPATIENT)
Dept: PHARMACY | Facility: HOSPITAL | Age: 69
End: 2024-09-27

## 2024-09-27 ENCOUNTER — OFFICE VISIT (OUTPATIENT)
Dept: CARDIOLOGY | Facility: CLINIC | Age: 69
End: 2024-09-27
Payer: MEDICARE

## 2024-09-27 VITALS
HEIGHT: 67 IN | WEIGHT: 239.9 LBS | DIASTOLIC BLOOD PRESSURE: 86 MMHG | OXYGEN SATURATION: 89 % | HEART RATE: 84 BPM | BODY MASS INDEX: 37.65 KG/M2 | SYSTOLIC BLOOD PRESSURE: 145 MMHG

## 2024-09-27 DIAGNOSIS — R93.1 DECREASED CARDIAC EJECTION FRACTION: ICD-10-CM

## 2024-09-27 DIAGNOSIS — I50.9 CONGESTIVE HEART FAILURE, UNSPECIFIED HF CHRONICITY, UNSPECIFIED HEART FAILURE TYPE: ICD-10-CM

## 2024-09-27 DIAGNOSIS — E11.9 DIABETES MELLITUS WITHOUT COMPLICATION (MULTI): ICD-10-CM

## 2024-09-27 PROCEDURE — 3077F SYST BP >= 140 MM HG: CPT | Performed by: INTERNAL MEDICINE

## 2024-09-27 PROCEDURE — 99214 OFFICE O/P EST MOD 30 MIN: CPT | Mod: 25 | Performed by: INTERNAL MEDICINE

## 2024-09-27 PROCEDURE — 97803 MED NUTRITION INDIV SUBSEQ: CPT | Performed by: INTERNAL MEDICINE

## 2024-09-27 PROCEDURE — 3048F LDL-C <100 MG/DL: CPT | Performed by: INTERNAL MEDICINE

## 2024-09-27 PROCEDURE — 1126F AMNT PAIN NOTED NONE PRSNT: CPT | Performed by: INTERNAL MEDICINE

## 2024-09-27 PROCEDURE — 1159F MED LIST DOCD IN RCRD: CPT | Performed by: INTERNAL MEDICINE

## 2024-09-27 PROCEDURE — 3044F HG A1C LEVEL LT 7.0%: CPT | Performed by: INTERNAL MEDICINE

## 2024-09-27 PROCEDURE — 99214 OFFICE O/P EST MOD 30 MIN: CPT | Performed by: INTERNAL MEDICINE

## 2024-09-27 PROCEDURE — 3079F DIAST BP 80-89 MM HG: CPT | Performed by: INTERNAL MEDICINE

## 2024-09-27 PROCEDURE — 3060F POS MICROALBUMINURIA REV: CPT | Performed by: INTERNAL MEDICINE

## 2024-09-27 PROCEDURE — 1123F ACP DISCUSS/DSCN MKR DOCD: CPT | Performed by: INTERNAL MEDICINE

## 2024-09-27 PROCEDURE — 3008F BODY MASS INDEX DOCD: CPT | Performed by: INTERNAL MEDICINE

## 2024-09-27 RX ORDER — DAPAGLIFLOZIN 10 MG/1
10 TABLET, FILM COATED ORAL
Qty: 90 TABLET | Refills: 11 | Status: SHIPPED | OUTPATIENT
Start: 2024-09-27

## 2024-09-27 ASSESSMENT — ENCOUNTER SYMPTOMS
OCCASIONAL FEELINGS OF UNSTEADINESS: 0
LOSS OF SENSATION IN FEET: 0
DEPRESSION: 0

## 2024-09-27 ASSESSMENT — PAIN SCALES - GENERAL: PAINLEVEL: 0-NO PAIN

## 2024-09-27 NOTE — PROGRESS NOTES
[facility] - MEDICAL NUTRITION THERAPY     69 y.o. is here today for nutrition counseling and support for coronary artery disease. Referred by Dr Trina Floyd    Social History: Pt shares she was just told she had prediabetes about 3 weeks ago though her HgA1c was 6.8% 7 months ago. She has been on Farixga for about 3 years now for HF. Pt is  and lives alone. Her daughter lives in [place].    Lab Results   Component Value Date    HGBA1C 6.6 (H) 08/13/2024      Pt was diagnosed with prediabetes 3 weeks ago.  Current DM meds include: Farixga   Pt uses no monitors for SMBG.    Lab Results   Component Value Date    TRIG 134 08/13/2024        Blood Pressures         9/27/2024  1029             BP: 145/86           Weight management:   Vitals:    09/27/24 1029   Weight: 109 kg (239 lb 14.4 oz)    Body mass index is 37.57 kg/m².     Diet Recall:  Meal 1-donut  Meal 2-jo bowl Banquet-chicken fried steak with green beans, corn, mashed potatoes  Meal 3-Talita Calendar-Pot Pie (only ate 3 spoonfuls due to feeling unwell)  Beverages-5 water bottles, green tea with lemon, honey, decaf coffee with SF creamer    Typical intake: Pt likes her sweets like cookies, m & ms and peanuts. She mentions that farixga reduces her appetite and normally eats only one meal dinner. She cooks on Sundays, but regularly uses frozen meals throughout the week. She will eat protein bars sometimes.  Alcohol: none     Exercise: does aqua therapy about 3x per week, uses stepper at home daily and PT for her hand; uses a cane    Tobacco Use: Low Risk  (9/27/2024)    Patient History     Smoking Tobacco Use: Never     Smokeless Tobacco Use: Never     Passive Exposure: Not on file      Sleep: Sleep duration/quality : 7+ hours and continuous sleep Sleep disorders: none    Education/plan:  Reviewed [employer] welcome folder.   Pt did accept meeting invites for education calls.  Pt is interested in CGM, but Dexcom not compatible with phone.  Nutrition goal: Discussed  strategies to help reduce sweet cravings. Emphasized adding lean protein to snacks to regulate appetite.  Exercise goal: Pt agreed to join chair exercise classes.    Learning assessment/barriers:  n/a    I will follow up with patient in 1-2 weeks for virtual nutrition follow-up.    30 minutes spent face to face.   Kassandra Martin RD, LD, CDE

## 2024-09-27 NOTE — TELEPHONE ENCOUNTER
Patient Assistance Screening (VAF)    Patient verbally reports monthly or yearly income which is less than 400% federal poverty level    Application for program will be submitted for the following medications: Farxiga    Patient aware that they will need to provide appropriate proof of income documents: [facility] 1040    Patient aware this process may take up to 2-4 weeks from time of submission and application will be submitted upon receipt of income documents     If approved, medication must be filled through [facility] pharmacy and may be picked up or mailed to patient. If approved, medication will be billed through insurance, and patient assistance team will pay the copay. This will result in a $0 copay for the patient.    Elidia Guillory PharmD [facility]  Clinical Pharmacy Specialist, Primary Care

## 2024-09-27 NOTE — PROGRESS NOTES
Primary Care Physician: Trina Floyd DO  Date of Visit: 09/27/2024 10:40 AM EDT  Location of visit: [facility] [place] [address]     Chief Complaint:   Chief Complaint   Patient presents with    New Patient Visit        HPI / Summary:   Jerri Ramirez is a 69 y.o. female presents for followup.     She presents in office follow-up evaluation for evaluation and management of diabetes risk cardiovascular risk in the background history of heart failure with relatively preserved LV ejection fraction.  She notes having recently had a COVID-vaccine and an RSV vaccine and feels pretty poor she is also had some family stressors.  She was less than enthusiastic about initiating meds.      Specialty Problems          Cardiology Problems    Congestive heart failure (Multi)     Chronic condition documentation: stable based on symptoms and exam. Continue established treatment plan and follow-up at least yearly.         Coronary artery disease involving native coronary artery of native heart without angina pectoris    Decreased cardiac ejection fraction    Essential hypertension    History of ST elevation myocardial infarction (STEMI)    Hyperlipidemia     Unspecified hyperlipidemia type         ICD (implantable cardioverter-defibrillator) in place    Ischemic cardiomyopathy    PAD (peripheral artery disease) (CMS-HCC)     Chronic condition documentation: stable based on symptoms and exam. Continue established treatment plan and follow-up at least yearly.         Ventricular tachycardia, unspecified (Multi)    History of hypertension    Impaired left ventricular function        Past Medical History:   Diagnosis Date    Abnormal finding of blood chemistry, unspecified 11/06/2019    Abnormal blood chemistry    Acute kidney failure, unspecified (CMS-HCC)     Acute kidney injury    Acute posthemorrhagic anemia     Postoperative anemia due to acute blood loss    Adjustment disorder with depressed mood 11/13/2019    Grief reaction     Atherosclerotic heart disease of native coronary artery without angina pectoris 11/16/2017    Coronary artery disease    Bilateral primary osteoarthritis of knee     Osteoarthritis of both knees    Body mass index (BMI)40.0-44.9, adult 02/12/2021    Body mass index (BMI) of 40.0 to 44.9 in adult    Contusion of left wrist, initial encounter 11/21/2019    Contusion of left wrist, initial encounter    Dermatitis, unspecified 01/12/2018    Dermatitis of external ear    Encounter for follow-up examination after completed treatment for conditions other than malignant neoplasm 07/30/2021    Status post orthopedic surgery, follow-up exam    Encounter for other preprocedural examination 05/20/2021    Pre-operative clearance    Encounter for other screening for malignant neoplasm of breast 10/30/2020    Screening for breast cancer    Encounter for other screening for malignant neoplasm of breast 04/20/2017    Breast cancer screening    History of falling 11/20/2019    H/O fall    History of falling 11/13/2019    H/O fall    Hypomagnesemia     Hypomagnesemia    Myalgia, other site 10/26/2017    Myofascial pain    Nondisplaced transverse fracture of left patella, subsequent encounter for closed fracture with nonunion 10/28/2018    Closed nondisplaced transverse fracture of left patella with nonunion, subsequent encounter    Obesity, unspecified 10/31/2017    Obesity (BMI 30.0-34.9)    Other hemorrhoids     Internal hemorrhoid    Other injury of unspecified body region, initial encounter 01/08/2018    Hematoma    Other specified acquired deformities of right lower leg     Acquired genu recurvatum of right knee    Other specified symptoms and signs involving the circulatory and respiratory systems 02/12/2021    Chest congestion    Overweight     Overweight    Pain in unspecified hand 11/20/2019    Hand pain    Pain in unspecified hip 10/26/2017    Hip pain    Pain in unspecified knee 06/02/2016    Knee pain    Personal history  of colonic polyps     History of colonic polyps    Personal history of diseases of the blood and blood-forming organs and certain disorders involving the immune mechanism     History of iron deficiency anemia    Personal history of other benign neoplasm 06/22/2016    History of other benign neoplasm    Personal history of other diseases of the circulatory system     History of cardiac disorder    Personal history of other diseases of the circulatory system 10/10/2016    History of hypertension    Personal history of other diseases of the digestive system 11/13/2019    History of acute pancreatitis    Personal history of other diseases of the musculoskeletal system and connective tissue 12/31/2013    History of neck pain    Personal history of other diseases of the musculoskeletal system and connective tissue 10/26/2017    History of low back pain    Personal history of other diseases of the musculoskeletal system and connective tissue 06/22/2016    History of temporomandibular joint disorder    Personal history of other diseases of the musculoskeletal system and connective tissue 09/20/2021    History of low back pain    Personal history of other endocrine, nutritional and metabolic disease 11/13/2019    History of hypokalemia    Personal history of other endocrine, nutritional and metabolic disease     History of dehydration    Personal history of other specified conditions 06/27/2019    History of abdominal pain    Personal history of peptic ulcer disease     History of peptic ulcer    Presence of left artificial knee joint 01/30/2020    History of left knee replacement    Radiculopathy, lumbar region 04/24/2019    Lumbar radicular pain    Reaction to severe stress, unspecified 06/30/2019    Situational stress    Spondylolysis, cervical region     Cervical spondylolysis    Unspecified symptoms and signs involving the genitourinary system 09/07/2018    Lower urinary tract symptoms (LUTS)    Unspecified systolic  (congestive) heart failure (Multi) 2021    NYHA class 3 heart failure with reduced ejection fraction    Urge incontinence 2016    Sensory urge incontinence          Past Surgical History:   Procedure Laterality Date     SECTION, CLASSIC  2013     Section    CORONARY ANGIOPLASTY WITH STENT PLACEMENT  2017    Cath Stent Placement    CT AORTA AND BILATERAL ILIOFEMORAL RUNOFF ANGIOGRAM W AND/OR WO IV CONTRAST  2017    CT AORTA AND BILATERAL ILIOFEMORAL RUNOFF ANGIOGRAM W AND/OR WO IV CONTRAST 2017 [facility]    FOOT SURGERY  2013    Foot Surgery    GALLBLADDER SURGERY  2013    Gallbladder Surgery    HIP SURGERY  2013    Hip Surgery    KNEE SURGERY  2017    Knee Surgery    OTHER SURGICAL HISTORY  2020    Shoulder replacement          Social History:   reports that she has never smoked. She has never used smokeless tobacco. She reports current drug use. Drug: Marijuana. She reports that she does not drink alcohol.      Allergies:  Allergies   Allergen Reactions    Lisinopril Swelling and Angioedema    Lisinopril-Hydrochlorothiazide Other    Tramadol Other     irritates pancreas       Outpatient Medications:  Current Outpatient Medications   Medication Instructions    albuterol 90 mcg/actuation inhaler USE 2 INHALATIONS EVERY 4 HOURS IF NEEDED FOR WHEEZING OR SHORTNESS OF BREATH    amLODIPine (NORVASC) 10 mg, oral, Daily    aspirin 81 mg EC tablet 1 tablet, oral, Daily    atorvastatin (Lipitor) 80 mg tablet TAKE 1 TABLET AT BEDTIME    carvedilol (Coreg) 25 mg tablet TAKE 1 TABLET TWICE A DAY    dapagliflozin propanediol (FARXIGA) 10 mg, oral, Daily before breakfast    diclofenac sodium (VOLTAREN) 2 g, Topical, 4 times daily, Apply to upper extremities, to affected area. Do not apply more than 8 GM daily to any one affected joint    docusate sodium (COLACE) 200 mg, oral, 2 times daily    fluticasone (Flonase) 50 mcg/actuation nasal  "spray 2 sprays, Each Nostril, Daily    furosemide (LASIX) 20 mg, oral, Daily, As directed    gabapentin (NEURONTIN) 1,200 mg, oral, 3 times daily    MagOx 400 mg, oral, Daily    omeprazole (PRILOSEC) 20 mg, oral, Daily before breakfast    trospium (SANCTURA XR) 60 mg, oral, Daily       ROS     Physical Exam:  Vitals:    09/27/24 1029   BP: 145/86   BP Location: Right arm   Patient Position: Sitting   BP Cuff Size: Large adult   Pulse: 84   SpO2: (!) 89%   Weight: 109 kg (239 lb 14.4 oz)   Height: 1.702 m (5' 7\")     Wt Readings from Last 5 Encounters:   09/27/24 109 kg (239 lb 14.4 oz)   08/13/24 113 kg (248 lb 4.8 oz)   08/05/24 111 kg (245 lb)   04/10/24 110 kg (242 lb)   03/28/24 114 kg (251 lb)     Body mass index is 37.57 kg/m².   Well-developed female no acute distress.  Clear lungs.  Soft abdomen.  Right ankle edema worse than the left intact pedal pulses     Last Labs:  CMP:  Recent Labs     08/13/24  1222 02/07/24  1121 03/16/23  1114 02/22/23  1134 04/08/22  0943    142 139 143 139   K 4.3 4.4 4.2 4.4 4.7    104 103 107 104   CO2 28 28 30 27 28   ANIONGAP 14 14 10 13 12   BUN 13 15 11 13 16   CREATININE 0.71 0.83 0.81 0.72 0.90   EGFR >90 76  --   --   --    GLUCOSE 105* 126* 111* 108* 119*     Recent Labs     02/07/24  1121 02/22/23  1134 04/08/22  0943 02/16/22  0000 02/12/21  0957 11/13/19  1451 10/24/19  0651 10/19/19  1702 10/19/19  0042 10/18/19  1635 06/27/19  1200   ALBUMIN 4.4 4.3 4.2  --  4.3 4.2   < > 3.5 2.3* 3.5  --    ALKPHOS 119 112  --   --   --  94  --  82 52 72  --    ALT 16 16  --  14  --  10  --  10 7 11  --    AST 17 18  --  16  --  13  --  16 11 21  --    BILITOT 0.4 0.3  --   --   --  0.5  --  0.6 0.4 0.7  --    LIPASE  --   --   --   --   --   --   --   --   --  758* <3*    < > = values in this interval not displayed.     CBC:  Recent Labs     08/13/24  1222 02/07/24  1121 03/16/23  1114 02/22/23  1134 06/30/21  0955   WBC 8.0 7.3 7.6 6.3 6.8   HGB 12.8 12.9 12.1 12.0 " 11.5*   HCT 41.5 41.1 39.4 40.1 38.7    318 313 352 331   MCV 90 93 92 93 95     COAG:   Recent Labs     08/03/18  1037 12/13/17  0507   INR 1.0 1.1     HEME/ENDO:  Recent Labs     08/13/24  1222 02/07/24  1121 02/22/23  1134 10/18/17  1245   TSH  --  1.09 1.41 1.70   HGBA1C 6.6* 6.8* 6.2*  --       CARDIAC:   Recent Labs     08/13/24  1222 04/08/22  0943 11/03/17  0911 10/16/17  1058   * 114* 163* 558*     Recent Labs     08/13/24  1222 02/07/24  1121 02/22/23  1134 02/16/22  0000 10/19/19  0042   CHOL 176 175 150 157 127   LDLF  --   --  75 92 71   HDL 51.1 52.5 53.0 48.6 42.3   TRIG 134 137 108 80 70       Last Cardiology Tests:  ECG:      Echo:  Echo Results:  No results found for this or any previous visit from the past 3650 days.       Cath:      Stress Test:  Stress Results:  No results found for this or any previous visit from the past 365 days.         Cardiac Imaging:  XR chest 2 views  Narrative: Interpreted By:  Tesfaye Stuart,   STUDY:  XR CHEST 2 VIEWS; ;  8/13/2024 1:08 pm      INDICATION:  Signs/Symptoms:congestion.      COMPARISON:  01/08/2018      ACCESSION NUMBER(S):  [id number]      ORDERING CLINICIAN:  LEENA LECHUGA      TECHNIQUE:  3 radiographs of the chest are performed.      FINDINGS:  The heart is at the upper limits of normal in size and unchanged. The  pulmonary vessels are within normal limits there are linear areas of  atelectasis in the left mid and lower lung which is slightly  increased in the interval. There is no airspace consolidation,  pleural effusion, or pneumothorax. Postoperative changes are  identified in both shoulders and lumbar spine. There are multilevel  discogenic degenerative changes of the thoracic spine.      Impression: Linear areas of atelectasis are identified in the left mid and lower  lung.      No airspace consolidation.          MACRO:  None      Signed by: Tesfaye Stuart 8/16/2024 4:10 PM  Dictation workstation:    [id number]        Assessment/Plan   This very pleasant diabetic female with heart failure preserved EF, elevated BMI hypertension and dyslipidemia.  Will try in the future to get her on a GLP-1 and consider the addition of more intensification of lipid-lowering therapy possibly with ezetimibe.  She is unenthusiastic about starting these therapies at this juncture she feels she has too much on her plate right now.  Will give her a CGM and work towards dietary and lifestyle interventions and see how she is doing in 3 months and readdress these issues at that time cost is also a concern.  Thank you for allowing me to participate in your patient's care      Orders:  No orders of the defined types were placed in this encounter.     Followup Appts:  Future Appointments   Date Time Provider Department Center   9/27/2024 10:40 AM Reginald Lerner MD [id number] [facility]   10/3/2024 10:00 AM Bety Fischer PA-C [id number] [facility]   10/3/2024 11:00 AM [employer] [employer] [facility] [id number] [facility]   10/4/2024 10:30 AM Elidia Guillory, PharmD [id number] [facility]   11/12/2024 11:40 AM Trina Floyd DO [id number] [facility]   2/11/2025 10:00 AM Trina Floyd DO [id number] [facility]           ____________________________________________________________  Reginald Lerner MD    Senior Attending Physician  [facility]  [facility]

## 2024-09-30 ENCOUNTER — APPOINTMENT (OUTPATIENT)
Dept: CARDIOLOGY | Facility: CLINIC | Age: 69
End: 2024-09-30
Payer: MEDICARE

## 2024-10-03 ENCOUNTER — CLINICAL SUPPORT (OUTPATIENT)
Dept: NUTRITION | Facility: CLINIC | Age: 69
End: 2024-10-03
Payer: MEDICARE

## 2024-10-03 ENCOUNTER — OFFICE VISIT (OUTPATIENT)
Dept: UROLOGY | Facility: CLINIC | Age: 69
End: 2024-10-03
Payer: MEDICARE

## 2024-10-03 VITALS
WEIGHT: 238.2 LBS | RESPIRATION RATE: 16 BRPM | HEART RATE: 84 BPM | BODY MASS INDEX: 37.39 KG/M2 | DIASTOLIC BLOOD PRESSURE: 83 MMHG | TEMPERATURE: 96.6 F | SYSTOLIC BLOOD PRESSURE: 130 MMHG | HEIGHT: 67 IN

## 2024-10-03 DIAGNOSIS — Z13.9 ENCOUNTER FOR SCREENING INVOLVING SOCIAL DETERMINANTS OF HEALTH (SDOH): ICD-10-CM

## 2024-10-03 DIAGNOSIS — B37.9 YEAST INFECTION: Primary | ICD-10-CM

## 2024-10-03 PROCEDURE — 99213 OFFICE O/P EST LOW 20 MIN: CPT | Performed by: PHYSICIAN ASSISTANT

## 2024-10-03 PROCEDURE — 3075F SYST BP GE 130 - 139MM HG: CPT | Performed by: PHYSICIAN ASSISTANT

## 2024-10-03 PROCEDURE — 1125F AMNT PAIN NOTED PAIN PRSNT: CPT | Performed by: PHYSICIAN ASSISTANT

## 2024-10-03 PROCEDURE — 3044F HG A1C LEVEL LT 7.0%: CPT | Performed by: PHYSICIAN ASSISTANT

## 2024-10-03 PROCEDURE — 3008F BODY MASS INDEX DOCD: CPT | Performed by: PHYSICIAN ASSISTANT

## 2024-10-03 PROCEDURE — 3060F POS MICROALBUMINURIA REV: CPT | Performed by: PHYSICIAN ASSISTANT

## 2024-10-03 PROCEDURE — 1036F TOBACCO NON-USER: CPT | Performed by: PHYSICIAN ASSISTANT

## 2024-10-03 PROCEDURE — 3048F LDL-C <100 MG/DL: CPT | Performed by: PHYSICIAN ASSISTANT

## 2024-10-03 PROCEDURE — 3079F DIAST BP 80-89 MM HG: CPT | Performed by: PHYSICIAN ASSISTANT

## 2024-10-03 PROCEDURE — 1123F ACP DISCUSS/DSCN MKR DOCD: CPT | Performed by: PHYSICIAN ASSISTANT

## 2024-10-03 PROCEDURE — 51798 US URINE CAPACITY MEASURE: CPT | Performed by: PHYSICIAN ASSISTANT

## 2024-10-03 RX ORDER — FLUCONAZOLE 100 MG/1
100 TABLET ORAL DAILY
Qty: 2 TABLET | Refills: 1 | Status: SHIPPED | OUTPATIENT
Start: 2024-10-03 | End: 2024-10-05

## 2024-10-03 ASSESSMENT — ENCOUNTER SYMPTOMS
NEUROLOGICAL NEGATIVE: 1
RESPIRATORY NEGATIVE: 1
PSYCHIATRIC NEGATIVE: 1
ENDOCRINE NEGATIVE: 1
GASTROINTESTINAL NEGATIVE: 1
EYES NEGATIVE: 1
MUSCULOSKELETAL NEGATIVE: 1
CARDIOVASCULAR NEGATIVE: 1
CONSTITUTIONAL NEGATIVE: 1
HEMATOLOGIC/LYMPHATIC NEGATIVE: 1
ALLERGIC/IMMUNOLOGIC NEGATIVE: 1

## 2024-10-03 ASSESSMENT — PAIN SCALES - GENERAL: PAINLEVEL: 5

## 2024-10-03 NOTE — PROGRESS NOTES
Subjective   Patient ID: Jerri Ramirez is a 69 y.o. female who presents for Follow-up.  HPI  Patient is a 69 yo female with OAB on trospium 60 mg seen for a follow up     Patient is doing well symptomatically.  She denies urinary frequency or urgency. She denies incontinence. She denies nocturia. She denies hematuria or dysuria     She is is satisfied with trospium.     Patient reports some vaginal itching a few weeks ago. Resolved after taking amoxicillin she had on hand. She denies vaginal discharge.     PVR 16 could not void  Review of Systems   Constitutional: Negative.    HENT: Negative.     Eyes: Negative.    Respiratory: Negative.     Cardiovascular: Negative.    Gastrointestinal: Negative.    Endocrine: Negative.    Genitourinary: Negative.    Musculoskeletal: Negative.    Skin: Negative.    Allergic/Immunologic: Negative.    Neurological: Negative.    Hematological: Negative.    Psychiatric/Behavioral: Negative.         Objective   Physical Exam  Constitutional:       General: She is not in acute distress.     Appearance: Normal appearance.   HENT:      Head: Normocephalic and atraumatic.      Nose: Nose normal.      Mouth/Throat:      Mouth: Mucous membranes are dry.   Cardiovascular:      Rate and Rhythm: Normal rate.   Pulmonary:      Effort: Pulmonary effort is normal.   Abdominal:      General: Abdomen is flat.      Palpations: Abdomen is soft.   Musculoskeletal:         General: Normal range of motion.      Cervical back: Normal range of motion and neck supple.   Skin:     General: Skin is warm and dry.   Neurological:      General: No focal deficit present.      Mental Status: She is alert and oriented to person, place, and time.   Psychiatric:         Mood and Affect: Mood normal.         Assessment/Plan   Problem List Items Addressed This Visit    None  Visit Diagnoses         Codes    Yeast infection    -  Primary B37.9    Relevant Medications    fluconazole (Diflucan) 100 mg tablet           Prescription of Diflucan given to keep on hand.     Continue Trospium due to improved symptoms and lack of sie effects.    Follow up in 1 year or sooner as needed        eBty Fischer PA-C 10/03/24 1:43 PM

## 2024-10-03 NOTE — PROGRESS NOTES
[employer]  Diet Recommendation 1: Heart Healthy  Diet Recommendation 2: Healthy Eating  Food Intolerance Avoidance: NKFA  Household Size: 2 Family Members  Interventions: Referral Number: 1st 6 Mo Referral 6 Mos  Interventions: Visit Number: 2 of 6 Visits - Max 6 Visits/Referral Each 6 Mo Period  Grains: 25-50% Whole  Fruit: 50-75% Fresh  Vegetables: % Fresh  Proteins: 1-2 Plant-based Items  Dairy: 25-50% Lowfat  Originating Site of Referral Order: [facility]  Initials of RD Assisting Today: CHARLES

## 2024-10-04 ENCOUNTER — APPOINTMENT (OUTPATIENT)
Dept: PHARMACY | Facility: HOSPITAL | Age: 69
End: 2024-10-04
Payer: MEDICARE

## 2024-10-04 PROCEDURE — RXMED WILLOW AMBULATORY MEDICATION CHARGE

## 2024-10-07 ENCOUNTER — TELEPHONE (OUTPATIENT)
Dept: PHARMACY | Facility: HOSPITAL | Age: 69
End: 2024-10-07
Payer: MEDICARE

## 2024-10-07 NOTE — TELEPHONE ENCOUNTER
Patient Assistance Program Application Status     Jerri Ramirez is a 69 y.o. female who was referred to the Clinical Pharmacy Team by Trina Floyd, DO     We are pleased to inform you that your application for assistance has been approved.    This approval is valid through October 4, 2025  as long as the following criteria continue to be satisfied:     Your medication (Farxiga) remains covered under your current insurance plan.   Your prescriber does not discontinue therapy.   You do not seek reimbursement from any other private or government-funded programs for the  medication.    Under this program, the pharmacy will first bill your insurance plan for your specified medication. The [employer] will then offset your copay balance, so that your out-of pocket expense for your medication will be $0.00.     Medication will be filled through [facility], and mailed to the patient. Contacted on the status of the approval. Provided the [facility] phone number, and made aware that they will be hearing from someone at [facility] to set up delivery for the prescriptions.     Please reach out to the Clinical Pharmacy Team if there are any further questions.     Follow up with Clinical Pharmacy Team - 1 year    Continue all meds under the continuation of care with the referring provider and clinical pharmacy team.    Verbal consent to manage patient's drug therapy was obtained from patient. They were informed they may decline to participate or withdraw from participation in pharmacy services at any time.

## 2024-10-08 ENCOUNTER — PHARMACY VISIT (OUTPATIENT)
Dept: PHARMACY | Facility: CLINIC | Age: 69
End: 2024-10-08
Payer: COMMERCIAL

## 2024-10-11 ENCOUNTER — TELEMEDICINE CLINICAL SUPPORT (OUTPATIENT)
Dept: CARDIOLOGY | Facility: CLINIC | Age: 69
End: 2024-10-11
Payer: MEDICARE

## 2024-10-11 ENCOUNTER — APPOINTMENT (OUTPATIENT)
Dept: PHARMACY | Facility: HOSPITAL | Age: 69
End: 2024-10-11
Payer: MEDICARE

## 2024-10-11 DIAGNOSIS — E11.9 DIABETES MELLITUS WITHOUT COMPLICATION (MULTI): Primary | ICD-10-CM

## 2024-10-11 PROCEDURE — 98967 PH1 ASSMT&MGMT NQHP 11-20: CPT | Performed by: DIETITIAN, REGISTERED

## 2024-10-11 NOTE — PROGRESS NOTES
Reason for Nutrition Visit:  Pt is a 69 y.o. female being seen for telehealth nutrition follow-up post CINEMA visit.    Virtual or Telephone Consent    A telephone visit (audio only) between the patient (at the originating site) and the provider (at the distant site) was utilized to provide this telehealth service.      Past Medical Hx:  Patient Active Problem List   Diagnosis    Acquired scoliosis    Acquired spondylolisthesis    Arthralgia of left wrist    Arthritis of left shoulder region    Arthritis of right shoulder region    Back pain    Back pain, lumbosacral    Bilateral low back pain    Cervical radiculopathy    Cervical spondylosis    Chronic lumbar radiculopathy    Constipation    Coronary artery disease involving native coronary artery of native heart without angina pectoris    Cubital tunnel syndrome, right    De Quervain's tenosynovitis, right    Decreased cardiac ejection fraction    Decreased range of motion of left shoulder    Degenerative disc disease, lumbar    Essential hypertension    GERD (gastroesophageal reflux disease)    Congestive heart failure    History of ST elevation myocardial infarction (STEMI)    Hyperlipidemia    Hypomagnesemia    ICD (implantable cardioverter-defibrillator) in place    Ischemic cardiomyopathy    Left foot pain    Left shoulder pain    Low back strain    Lower urinary tract symptoms (LUTS)    Lumbar spinal stenosis    Muscle weakness    Osteoarthritis of left knee    PAD (peripheral artery disease) (CMS-[facility])    Postural kyphosis of thoracolumbar region    Pre-diabetes    Primary osteoarthritis of right knee    Right carpal tunnel syndrome    Right hand paresthesia    Right shoulder pain    Scoliosis    Status post left knee replacement    Ventricular tachycardia, unspecified (Multi)    Shoulder pain    MARCY (acute kidney injury) (CMS-[facility])    Colon polyp    History of total hip replacement    Internal hemorrhoid    Iron deficiency anemia    Peptic ulcer    Acute  posthemorrhagic anemia    Urge incontinence of urine    Acute UTI    Arthritis of lumbar spine    Lumbar post-laminectomy syndrome    OA (osteoarthritis) of hip    Acquired genu recurvatum    Acute pancreatitis    Anemia    Arthritis of shoulder    Contusion of left wrist    Disorder of soft tissue    History of colonic polyps    History of hypertension    History of iron deficiency anemia    Hypokalemia    Impaired left ventricular function    Lipoma of back    Pulmonary congestion    Temporomandibular joint disorder    History of reverse total replacement of right shoulder joint    Diabetes mellitus without complication (Multi)      Daily Weight  10/03/24 : 108 kg (238 lb 3.2 oz)  09/27/24 : 109 kg (239 lb 14.4 oz)  08/13/24 : 113 kg (248 lb 4.8 oz)  08/05/24 : 111 kg (245 lb)  04/10/24 : 110 kg (242 lb)  03/28/24 : 114 kg (251 lb)     Weight change:  13# or 5% wt loss    Lab Results   Component Value Date    HGBA1C 6.6 (H) 08/13/2024    CHOL 176 08/13/2024    LDLCALC 98 08/13/2024    TRIG 134 08/13/2024    HDL 51.1 08/13/2024    LDLF 75 02/22/2023     Medications:  Current Diabetes Medications:  Farixga    New CINEMA Medications:  N/A    SMBG:   no monitors    Food and Nutrition Hx:  Pt is still struggling with sweet cravings and snacking on desserts. She does like pistachios and walnuts to snack on sometimes. She went to the [employer] life pant[employer] and got ground turkey. She mentions a lot of stress from her grandson who lives with her and has medical issues and also does not have a vehicle at this time. She tries to pray and spend time with her friend or watch [employer] sports to de stress. She has joined our zoom education class calls. She has been trying to use her stepper at home and will watch her shows. She is going to call about starting aerobic swim classes again.    24 Diet Recall:  Meal 1: peach and cherry yogurt  Meal 2: skipped  Snacks: 2 chocolate chip cookies  Meal 3: spaghetti  Snacks: 2 oatmeal raisin  cookies  Beverages: lemonade, water    Types of Activities: does aqua therapy about 3x per week, uses stepper at home daily and PT for her hand; uses a cane      Sleep duration/quality : 7+ hours and continuous sleep  Sleep disorders: none  Nutrition Goals/ Recommendations:  1) Always include a protein food with snacks to make you feel quinteros and reduce appetite. Try adding protein foods like peanut butter, nuts, low fat cheese, eggs, or greek yogurt to snacks. Suggested protein shakes like Glucerna as a meal replacement if pt doesn't want to cook.  2) Reminded pt to lean into her self care techniques like praying, being with loved ones and watching sports.  3) Keep up with using your stepper and try to sign up for aerobic swim classes.    Educational Handouts: ACLM Snacks    I will follow-up in two weeks

## 2024-10-15 ENCOUNTER — PATIENT MESSAGE (OUTPATIENT)
Dept: PRIMARY CARE | Facility: CLINIC | Age: 69
End: 2024-10-15
Payer: MEDICARE

## 2024-10-15 DIAGNOSIS — M54.50 BACK PAIN, LUMBOSACRAL: Primary | ICD-10-CM

## 2024-10-15 DIAGNOSIS — M96.1 LUMBAR POST-LAMINECTOMY SYNDROME: ICD-10-CM

## 2024-10-25 ENCOUNTER — TELEMEDICINE CLINICAL SUPPORT (OUTPATIENT)
Dept: CARDIOLOGY | Facility: CLINIC | Age: 69
End: 2024-10-25
Payer: MEDICARE

## 2024-10-25 DIAGNOSIS — E11.9 DIABETES MELLITUS WITHOUT COMPLICATION (MULTI): Primary | ICD-10-CM

## 2024-10-25 PROCEDURE — 98967 PH1 ASSMT&MGMT NQHP 11-20: CPT | Performed by: DIETITIAN, REGISTERED

## 2024-10-25 NOTE — PROGRESS NOTES
Reason for Nutrition Visit:  Pt is a 69 y.o. female being seen for telehealth nutrition follow-up post CINEMA visit.    Virtual or Telephone Consent    A telephone visit (audio only) between the patient (at the originating site) and the provider (at the distant site) was utilized to provide this telehealth service.      Past Medical Hx:  Patient Active Problem List   Diagnosis    Acquired scoliosis    Acquired spondylolisthesis    Arthralgia of left wrist    Arthritis of left shoulder region    Arthritis of right shoulder region    Back pain    Back pain, lumbosacral    Bilateral low back pain    Cervical radiculopathy    Cervical spondylosis    Chronic lumbar radiculopathy    Constipation    Coronary artery disease involving native coronary artery of native heart without angina pectoris    Cubital tunnel syndrome, right    De Quervain's tenosynovitis, right    Decreased cardiac ejection fraction    Decreased range of motion of left shoulder    Degenerative disc disease, lumbar    Essential hypertension    GERD (gastroesophageal reflux disease)    Congestive heart failure    History of ST elevation myocardial infarction (STEMI)    Hyperlipidemia    Hypomagnesemia    ICD (implantable cardioverter-defibrillator) in place    Ischemic cardiomyopathy    Left foot pain    Left shoulder pain    Low back strain    Lower urinary tract symptoms (LUTS)    Lumbar spinal stenosis    Muscle weakness    Osteoarthritis of left knee    PAD (peripheral artery disease) (CMS-[facility])    Postural kyphosis of thoracolumbar region    Pre-diabetes    Primary osteoarthritis of right knee    Right carpal tunnel syndrome    Right hand paresthesia    Right shoulder pain    Scoliosis    Status post left knee replacement    Ventricular tachycardia, unspecified (Multi)    Shoulder pain    MARCY (acute kidney injury) (CMS-[facility])    Colon polyp    History of total hip replacement    Internal hemorrhoid    Iron deficiency anemia    Peptic ulcer    Acute  posthemorrhagic anemia    Urge incontinence of urine    Acute UTI    Arthritis of lumbar spine    Lumbar post-laminectomy syndrome    OA (osteoarthritis) of hip    Acquired genu recurvatum    Acute pancreatitis    Anemia    Arthritis of shoulder    Contusion of left wrist    Disorder of soft tissue    History of colonic polyps    History of hypertension    History of iron deficiency anemia    Hypokalemia    Impaired left ventricular function    Lipoma of back    Pulmonary congestion    Temporomandibular joint disorder    History of reverse total replacement of right shoulder joint    Diabetes mellitus without complication (Multi)      Daily Weight  10/03/24 : 108 kg (238 lb 3.2 oz)  09/27/24 : 109 kg (239 lb 14.4 oz)  08/13/24 : 113 kg (248 lb 4.8 oz)  08/05/24 : 111 kg (245 lb)  04/10/24 : 110 kg (242 lb)  03/28/24 : 114 kg (251 lb)     Weight change:  13# or 5% wt loss    Lab Results   Component Value Date    HGBA1C 6.6 (H) 08/13/2024    CHOL 176 08/13/2024    LDLCALC 98 08/13/2024    TRIG 134 08/13/2024    HDL 51.1 08/13/2024    LDLF 75 02/22/2023     Medications:  Current Diabetes Medications:  Farixga    New CINEMA Medications:  N/A    SMBG:   no monitors    Food and Nutrition Hx:  Pt went to the eye doctor this week and reports her eye sight hasn't changed much from last year, which is good news. She has been trying to increase her veggie intake and notes her cravings for sugar has reduced. She will eat fruit or raisins if she is having a sweet craving. She did get a car and now able to drive herself to appointments.    24 Diet Recall:  Meal 1: skipped  Meal 2: skipped  Snack: one oatmeal cranberry cookie  Meal 3: turkey sandwich on wheat bread with lettuce, tomatoes, munster cheese and egg drop soup with sunchips  Beverages: water, decaf coffee with SG creamer, green tea and 1-2 ginger ale per week    Types of Activities: does aqua therapy about 3x per week, uses stepper at home daily and PT for her hand;  uses a cane      Sleep duration/quality : 7+ hours and continuous sleep  Sleep disorders: none    Nutrition Goals/ Recommendations:  1) Encouraged pt to join next education class on Monday with topic about mental health and nutrition.    Educational Handouts: n/a    I will follow-up in two weeks

## 2024-10-28 DIAGNOSIS — I10 ESSENTIAL HYPERTENSION: ICD-10-CM

## 2024-10-31 DIAGNOSIS — R09.81 NASAL CONGESTION: ICD-10-CM

## 2024-10-31 PROCEDURE — RXMED WILLOW AMBULATORY MEDICATION CHARGE

## 2024-10-31 RX ORDER — AMLODIPINE BESYLATE 10 MG/1
10 TABLET ORAL DAILY
Qty: 90 TABLET | Refills: 3 | Status: SHIPPED | OUTPATIENT
Start: 2024-10-31

## 2024-11-02 ENCOUNTER — PHARMACY VISIT (OUTPATIENT)
Dept: PHARMACY | Facility: CLINIC | Age: 69
End: 2024-11-02
Payer: COMMERCIAL

## 2024-11-04 RX ORDER — FLUTICASONE PROPIONATE 50 MCG
2 SPRAY, SUSPENSION (ML) NASAL DAILY
Qty: 16 G | Refills: 3 | Status: SHIPPED | OUTPATIENT
Start: 2024-11-04

## 2024-11-06 ENCOUNTER — CLINICAL SUPPORT (OUTPATIENT)
Dept: NUTRITION | Facility: CLINIC | Age: 69
End: 2024-11-06
Payer: MEDICARE

## 2024-11-06 NOTE — PROGRESS NOTES
[facility]  Diet Recommendation 1: Heart Healthy  Diet Recommendation 2: Healthy Eating  Food Intolerance Avoidance: NKFA  Household Size: 2 Family Members  Interventions: Referral Number: 1st 6 Mo Referral 6 Mos  Interventions: Visit Number: 3 of 6 Visits - Max 6 Visits/Referral Each 6 Mo Period  Grains: 0-25% Whole  Fruit: 25-50% Fresh  Vegetables: % Fresh  Proteins: 1-2 Plant-based Items  Dairy: 25-50% Lowfat  Originating Site of Referral Order: Elmo Cano  Initials of RD Assisting Today: CHARLES

## 2024-11-08 ENCOUNTER — APPOINTMENT (OUTPATIENT)
Dept: CARDIOLOGY | Facility: CLINIC | Age: 69
End: 2024-11-08
Payer: MEDICARE

## 2024-11-12 ENCOUNTER — APPOINTMENT (OUTPATIENT)
Dept: PRIMARY CARE | Facility: CLINIC | Age: 69
End: 2024-11-12
Payer: MEDICARE

## 2024-11-12 VITALS
HEART RATE: 84 BPM | SYSTOLIC BLOOD PRESSURE: 130 MMHG | RESPIRATION RATE: 18 BRPM | WEIGHT: 238 LBS | DIASTOLIC BLOOD PRESSURE: 80 MMHG | BODY MASS INDEX: 37.35 KG/M2 | OXYGEN SATURATION: 98 % | TEMPERATURE: 97 F | HEIGHT: 67 IN

## 2024-11-12 DIAGNOSIS — I50.9 HEART FAILURE, UNSPECIFIED HF CHRONICITY, UNSPECIFIED HEART FAILURE TYPE: ICD-10-CM

## 2024-11-12 DIAGNOSIS — R05.3 CHRONIC COUGH: Primary | ICD-10-CM

## 2024-11-12 DIAGNOSIS — K59.00 CONSTIPATION, UNSPECIFIED CONSTIPATION TYPE: ICD-10-CM

## 2024-11-12 PROCEDURE — 3044F HG A1C LEVEL LT 7.0%: CPT | Performed by: FAMILY MEDICINE

## 2024-11-12 PROCEDURE — G2211 COMPLEX E/M VISIT ADD ON: HCPCS | Performed by: FAMILY MEDICINE

## 2024-11-12 PROCEDURE — 1123F ACP DISCUSS/DSCN MKR DOCD: CPT | Performed by: FAMILY MEDICINE

## 2024-11-12 PROCEDURE — 99214 OFFICE O/P EST MOD 30 MIN: CPT | Performed by: FAMILY MEDICINE

## 2024-11-12 PROCEDURE — 1160F RVW MEDS BY RX/DR IN RCRD: CPT | Performed by: FAMILY MEDICINE

## 2024-11-12 PROCEDURE — 3075F SYST BP GE 130 - 139MM HG: CPT | Performed by: FAMILY MEDICINE

## 2024-11-12 PROCEDURE — 3048F LDL-C <100 MG/DL: CPT | Performed by: FAMILY MEDICINE

## 2024-11-12 PROCEDURE — 3079F DIAST BP 80-89 MM HG: CPT | Performed by: FAMILY MEDICINE

## 2024-11-12 PROCEDURE — 3060F POS MICROALBUMINURIA REV: CPT | Performed by: FAMILY MEDICINE

## 2024-11-12 PROCEDURE — 3008F BODY MASS INDEX DOCD: CPT | Performed by: FAMILY MEDICINE

## 2024-11-12 PROCEDURE — 1159F MED LIST DOCD IN RCRD: CPT | Performed by: FAMILY MEDICINE

## 2024-11-12 RX ORDER — BENZONATATE 200 MG/1
200 CAPSULE ORAL 3 TIMES DAILY PRN
Qty: 42 CAPSULE | Refills: 0 | Status: SHIPPED | OUTPATIENT
Start: 2024-11-12 | End: 2024-12-12

## 2024-11-12 RX ORDER — DOCUSATE SODIUM 250 MG
250 CAPSULE ORAL 2 TIMES DAILY
Qty: 180 CAPSULE | Refills: 3 | Status: SHIPPED | OUTPATIENT
Start: 2024-11-12

## 2024-11-12 ASSESSMENT — ENCOUNTER SYMPTOMS
OCCASIONAL FEELINGS OF UNSTEADINESS: 1
DEPRESSION: 0
LOSS OF SENSATION IN FEET: 0

## 2024-11-12 NOTE — PATIENT INSTRUCTIONS
Chronic cough- but better  - Albuterol as needed  - Benzonatate as needed  - Declined Chest CT, PULM, ECHO-but if cough unresolved please let me know for these referrals    *CONGRATULATIONS ON YOUR 10 LB WEIGHT LOSS!! *  - KEEP UP THE GOOD WORK  - Continue to work on healthy diet and exercise            Please follow up as scheduled FEB ( virtual or in office) or as needed.       ** If labs or imaging ordered at today's visit, all the non-urgent results will be discussed at your next visit    If you have been referred for a special test or to a specialist please call  [phone] to schedule an appointment.  If you have any further questions, or if develop new or worsened symptoms, please give our office a call at [phone].     Ways to Help Prevent Falls at Home    Quick Tips   ? Ask for help if you need it. Most people want to help!   ? Get up slowly after sitting or laying down   ? Wear a medical alert device or keep cell phone in your pocket   ? Use night lights, especially areas near a bathroom   ? Keep the items you use often within reach on a small stool or end table   ? Use an assistive device such as walker or cane, as directed by provider/physical therapy   ? Use a non-slip mat and grab bars in your bathroom. Look for home health sections for best options     Other Areas to Focus On   ? Exercise and nutrition: Regular exercise or taking a falls prevention class are great ways improve strength and balance. Don’t forget to stay hydrated and bring a snack!   ? Medicine side effects: Some medicines can make you sleepy or dizzy, which could cause a fall. Ask your healthcare provider about the side effects your medicines could cause. Be sure to let them know if you take any vitamins or supplements as well.   ? Tripping hazards: Remove items you could trip on, such as loose mats, rugs, cords, and clutter. Wear closed toe shoes with rubber soles.   ? Health and wellness: Get regular checkups with your  healthcare provider, plus routine vision and hearing screenings. Talk with your healthcare provider about:   o Your medicines and the possible side effects - bring them in a bag if that is easier!   o Problems with balance or feeling dizzy   o Ways to promote bone health, such as Vitamin D and calcium supplements   o Questions or concerns about falling     *Ask your healthcare team if you have questions     [facility], 2022

## 2024-11-12 NOTE — PROGRESS NOTES
"Subjective   Patient ID: Jerri Ramirez is a 69 y.o. female who presents for Follow-up (HTN visit).    HPI       HTN  BP at goal today in office.  Using medications without issues.  Denies CP, SOB, palpitations, change in vision, dizziness, N/V.    Cough- chronic  States overall has been better.  Still with slight productive cough.  Using albuterol inhaler which helps. has a history of congestive heart failure.  Has currently lost 10 pounds since in the last 3 months-with using the Farxiga states has with decreased appetite.  No chest pain, shortness of breath.  History of smoking in the past.  Has been with chronic left leg swelling with low back pain.  Getting physical therapy in the pool which helps  Using cane to walk        Review of Systems    All systems reviewed and neg if not noted in the HPI above       Objective   /80 (Patient Position: Sitting)   Pulse 84   Temp 36.1 °C (97 °F)   Resp 18   Ht 1.702 m (5' 7\")   Wt 108 kg (238 lb)   SpO2 98%   BMI 37.28 kg/m²     Physical Exam  Vitals reviewed.   Constitutional:       Appearance: Normal appearance.   HENT:      Head: Normocephalic.   Eyes:      Extraocular Movements: Extraocular movements intact.   Cardiovascular:      Rate and Rhythm: Normal rate and regular rhythm.      Heart sounds: Normal heart sounds. No murmur heard.  Pulmonary:      Effort: Pulmonary effort is normal. No respiratory distress.      Breath sounds: Normal breath sounds. No wheezing.   Abdominal:      General: Bowel sounds are normal. There is no distension.      Palpations: Abdomen is soft.   Skin:     General: Skin is warm and dry.   Neurological:      General: No focal deficit present.      Mental Status: She is alert and oriented to person, place, and time.   Psychiatric:         Mood and Affect: Mood normal.         Behavior: Behavior normal.         Thought Content: Thought content normal.         Judgment: Judgment normal.         Assessment/Plan   Problem List Items " Addressed This Visit             ICD-10-CM    Constipation K59.00    Relevant Medications    docusate sodium 250 mg capsule     Other Visit Diagnoses         Codes    Chronic cough    -  Primary  - Albuterol as needed  - Benzonatate as needed  - Declined Chest CT, PULM, ECHO-but if cough unresolved please let me know for these referrals R05.3    Relevant Medications    benzonatate (Tessalon) 200 mg capsule    Heart failure, unspecified HF chronicity, unspecified heart failure type     I50.9   -Continue follow-up with cardiology          Patient was identified as a fall risk. Risk prevention instructions provided.        Please follow up as scheduled FEB ( virtual or in office) or as needed.

## 2024-11-15 ENCOUNTER — TELEMEDICINE CLINICAL SUPPORT (OUTPATIENT)
Dept: CARDIOLOGY | Facility: CLINIC | Age: 69
End: 2024-11-15
Payer: MEDICARE

## 2024-11-15 DIAGNOSIS — E11.9 DIABETES MELLITUS WITHOUT COMPLICATION (MULTI): Primary | ICD-10-CM

## 2024-11-15 PROCEDURE — 98967 PH1 ASSMT&MGMT NQHP 11-20: CPT | Performed by: DIETITIAN, REGISTERED

## 2024-11-15 NOTE — PROGRESS NOTES
Reason for Nutrition Visit:  Pt is a 69 y.o. female being seen for telehealth nutrition follow-up post CINEMA visit.    Virtual or Telephone Consent    A telephone visit (audio only) between the patient (at the originating site) and the provider (at the distant site) was utilized to provide this telehealth service.      Past Medical Hx:  Patient Active Problem List   Diagnosis    Acquired scoliosis    Acquired spondylolisthesis    Arthralgia of left wrist    Arthritis of left shoulder region    Arthritis of right shoulder region    Back pain    Back pain, lumbosacral    Bilateral low back pain    Cervical radiculopathy    Cervical spondylosis    Chronic lumbar radiculopathy    Constipation    Coronary artery disease involving native coronary artery of native heart without angina pectoris    Cubital tunnel syndrome, right    De Quervain's tenosynovitis, right    Decreased cardiac ejection fraction    Decreased range of motion of left shoulder    Degenerative disc disease, lumbar    Essential hypertension    GERD (gastroesophageal reflux disease)    Congestive heart failure    History of ST elevation myocardial infarction (STEMI)    Hyperlipidemia    Hypomagnesemia    ICD (implantable cardioverter-defibrillator) in place    Ischemic cardiomyopathy    Left foot pain    Left shoulder pain    Low back strain    Lower urinary tract symptoms (LUTS)    Lumbar spinal stenosis    Muscle weakness    Osteoarthritis of left knee    PAD (peripheral artery disease) (CMS-[facility])    Postural kyphosis of thoracolumbar region    Pre-diabetes    Primary osteoarthritis of right knee    Right carpal tunnel syndrome    Right hand paresthesia    Right shoulder pain    Scoliosis    Status post left knee replacement    Ventricular tachycardia, unspecified (Multi)    Shoulder pain    MARCY (acute kidney injury) (CMS-[facility])    Colon polyp    History of total hip replacement    Internal hemorrhoid    Iron deficiency anemia    Peptic ulcer    Acute  posthemorrhagic anemia    Urge incontinence of urine    Acute UTI    Arthritis of lumbar spine    Lumbar post-laminectomy syndrome    OA (osteoarthritis) of hip    Acquired genu recurvatum    Acute pancreatitis    Anemia    Arthritis of shoulder    Contusion of left wrist    Disorder of soft tissue    History of colonic polyps    History of hypertension    History of iron deficiency anemia    Hypokalemia    Impaired left ventricular function    Lipoma of back    Pulmonary congestion    Temporomandibular joint disorder    History of reverse total replacement of right shoulder joint    Diabetes mellitus without complication (Multi)      Daily Weight  11/12/24 : 108 kg (238 lb)  10/03/24 : 108 kg (238 lb 3.2 oz)  09/27/24 : 109 kg (239 lb 14.4 oz)  08/13/24 : 113 kg (248 lb 4.8 oz)  08/05/24 : 111 kg (245 lb)  04/10/24 : 110 kg (242 lb)     Weight change:  13# or 5% wt loss    Lab Results   Component Value Date    HGBA1C 6.6 (H) 08/13/2024    CHOL 176 08/13/2024    LDLCALC 98 08/13/2024    TRIG 134 08/13/2024    HDL 51.1 08/13/2024    LDLF 75 02/22/2023     Medications:  Current Diabetes Medications:  Farixga    New CINEMA Medications:  N/A    SMBG:   no monitors    Food and Nutrition Hx:  Pt says her body is feeling sore today and she cancelled her swim therapy today. She went swimming twice this week and also went to hand therapy. She's been using exercises at home more to help with balance/core strength. She saw her PCP on Tuesday and reports she has lost 10#. She's going to a luncheon/card party today for a .     24 Diet Recall:  Meal 1: skipped  Meal 2: skipped  Meal 3: homemade boneless skinless chicken thighs with celery, carrots and dumplings made with croissant rolls  Snack: skinny pop, 2 oatmeal cranberry cookies, miniature snickers bar  Beverages: water, decaf coffee with SG creamer, green tea and 1-2 ginger ale per week    Types of Activities: does aqua therapy about 3x per week, uses stepper at  home daily and PT for her hand; uses a cane      Sleep duration/quality : 7+ hours and continuous sleep  Sleep disorders: none    Nutrition Goals/ Recommendations:  1) Pt would like to be signed up for upcoming  Health Talk on Diabetes and Mental Health.    Educational Handouts: n/a    I will follow-up in three weeks

## 2024-11-19 ENCOUNTER — HOSPITAL ENCOUNTER (OUTPATIENT)
Dept: CARDIOLOGY | Facility: CLINIC | Age: 69
Discharge: HOME | End: 2024-11-19
Payer: MEDICARE

## 2024-11-19 ENCOUNTER — TELEPHONE (OUTPATIENT)
Dept: PRIMARY CARE | Facility: CLINIC | Age: 69
End: 2024-11-19
Payer: MEDICARE

## 2024-11-19 DIAGNOSIS — I47.20 VT (VENTRICULAR TACHYCARDIA) (MULTI): ICD-10-CM

## 2024-11-19 DIAGNOSIS — K59.00 CONSTIPATION, UNSPECIFIED CONSTIPATION TYPE: ICD-10-CM

## 2024-11-19 DIAGNOSIS — Z95.810 PRESENCE OF AUTOMATIC CARDIOVERTER/DEFIBRILLATOR (AICD): ICD-10-CM

## 2024-11-19 PROCEDURE — 93296 REM INTERROG EVL PM/IDS: CPT

## 2024-11-19 RX ORDER — DOCUSATE SODIUM 250 MG
250 CAPSULE ORAL DAILY
Qty: 90 CAPSULE | Refills: 3 | Status: SHIPPED | OUTPATIENT
Start: 2024-11-19

## 2024-11-19 NOTE — TELEPHONE ENCOUNTER
Rep from pharmacy called in for verification on the Docusate medication because it is a higher dose and twice a day instead of once.  Please advise    Call back   Lexi from [facility]  [phone]  Ref: [id number]

## 2024-12-02 ENCOUNTER — HOSPITAL ENCOUNTER (OUTPATIENT)
Dept: CARDIOLOGY | Facility: CLINIC | Age: 69
Discharge: HOME | End: 2024-12-02
Payer: MEDICARE

## 2024-12-02 DIAGNOSIS — I47.20 VT (VENTRICULAR TACHYCARDIA) (MULTI): ICD-10-CM

## 2024-12-02 DIAGNOSIS — Z95.810 PRESENCE OF AUTOMATIC CARDIOVERTER/DEFIBRILLATOR (AICD): ICD-10-CM

## 2024-12-06 ENCOUNTER — TELEMEDICINE CLINICAL SUPPORT (OUTPATIENT)
Dept: CARDIOLOGY | Facility: CLINIC | Age: 69
End: 2024-12-06
Payer: MEDICARE

## 2024-12-06 DIAGNOSIS — E11.9 DIABETES MELLITUS WITHOUT COMPLICATION (MULTI): Primary | ICD-10-CM

## 2024-12-06 PROCEDURE — 97803 MED NUTRITION INDIV SUBSEQ: CPT | Mod: 95 | Performed by: DIETITIAN, REGISTERED

## 2024-12-06 PROCEDURE — 97803 MED NUTRITION INDIV SUBSEQ: CPT | Performed by: DIETITIAN, REGISTERED

## 2024-12-06 NOTE — PROGRESS NOTES
Reason for Nutrition Visit:  Pt is a 69 y.o. female being seen for telehealth nutrition follow-up post CINEMA visit.    Virtual or Telephone Consent    A telephone visit (audio only) between the patient (at the originating site) and the provider (at the distant site) was utilized to provide this telehealth service.      Past Medical Hx:  Patient Active Problem List   Diagnosis    Acquired scoliosis    Acquired spondylolisthesis    Arthralgia of left wrist    Arthritis of left shoulder region    Arthritis of right shoulder region    Back pain    Back pain, lumbosacral    Bilateral low back pain    Cervical radiculopathy    Cervical spondylosis    Chronic lumbar radiculopathy    Constipation    Coronary artery disease involving native coronary artery of native heart without angina pectoris    Cubital tunnel syndrome, right    De Quervain's tenosynovitis, right    Decreased cardiac ejection fraction    Decreased range of motion of left shoulder    Degenerative disc disease, lumbar    Essential hypertension    GERD (gastroesophageal reflux disease)    Congestive heart failure    History of ST elevation myocardial infarction (STEMI)    Hyperlipidemia    Hypomagnesemia    ICD (implantable cardioverter-defibrillator) in place    Ischemic cardiomyopathy    Left foot pain    Left shoulder pain    Low back strain    Lower urinary tract symptoms (LUTS)    Lumbar spinal stenosis    Muscle weakness    Osteoarthritis of left knee    PAD (peripheral artery disease) (CMS-[facility])    Postural kyphosis of thoracolumbar region    Pre-diabetes    Primary osteoarthritis of right knee    Right carpal tunnel syndrome    Right hand paresthesia    Right shoulder pain    Scoliosis    Status post left knee replacement    Ventricular tachycardia, unspecified (Multi)    Shoulder pain    MARCY (acute kidney injury) (CMS-[facility])    Colon polyp    History of total hip replacement    Internal hemorrhoid    Iron deficiency anemia    Peptic ulcer    Acute  posthemorrhagic anemia    Urge incontinence of urine    Acute UTI    Arthritis of lumbar spine    Lumbar post-laminectomy syndrome    OA (osteoarthritis) of hip    Acquired genu recurvatum    Acute pancreatitis    Anemia    Arthritis of shoulder    Contusion of left wrist    Disorder of soft tissue    History of colonic polyps    History of hypertension    History of iron deficiency anemia    Hypokalemia    Impaired left ventricular function    Lipoma of back    Pulmonary congestion    Temporomandibular joint disorder    History of reverse total replacement of right shoulder joint    Diabetes mellitus without complication (Multi)      Daily Weight  11/12/24 : 108 kg (238 lb)  10/03/24 : 108 kg (238 lb 3.2 oz)  09/27/24 : 109 kg (239 lb 14.4 oz)  08/13/24 : 113 kg (248 lb 4.8 oz)  08/05/24 : 111 kg (245 lb)  04/10/24 : 110 kg (242 lb)     Weight change:  13# or 5% wt loss    Lab Results   Component Value Date    HGBA1C 6.6 (H) 08/13/2024    CHOL 176 08/13/2024    LDLCALC 98 08/13/2024    TRIG 134 08/13/2024    HDL 51.1 08/13/2024    LDLF 75 02/22/2023     Medications:  Current Diabetes Medications:  Farixga    New CINEMA Medications:  N/A    SMBG:   no monitors    Food and Nutrition Hx:  Pt says she hasn't been swimming this week because of the weather. She signed up to go this Monday after her FFL visit. She mentions it will be challenging for her to go outside when its cold with her arthritis. She does keep up with stretching and using her peddler. She mentions its challenging to keep fresh fruit in the house and how it often ripens too quickly for her to eat. She does try to keep frozen fruits and vegetables in the house.    24 Hr Diet Recall:  Breakfast: skips  Lunch: skips  Snack: 2 windmill cookies and cup of tea  Dinner: turkey spaghetti  Snack: 1 cup of Sun Chips while watching the game and 1/2 shivam green tea  Beverages: water, decaf coffee with SG creamer, green tea and 1-2 ginger ale per week    Types of  Activities: does aqua therapy about 3x per week, uses stepper at home daily and PT for her hand; uses a cane      Sleep duration/quality : 7+ hours and continuous sleep  Sleep disorders: none    Nutrition Goals/ Recommendations:  1) Reminded pt of how SSB like green tea can quickly spike blood sugar.Advised to drink in small portions and less frequently. Encouraged pt to increase fruits and vegetables since her intake is low and these are rich sources of nutrients for her diet.    Educational Handouts: n/a

## 2024-12-09 ENCOUNTER — HOSPITAL ENCOUNTER (OUTPATIENT)
Dept: CARDIOLOGY | Facility: CLINIC | Age: 69
Discharge: HOME | End: 2024-12-09
Payer: MEDICARE

## 2024-12-09 ENCOUNTER — CLINICAL SUPPORT (OUTPATIENT)
Dept: NUTRITION | Facility: CLINIC | Age: 69
End: 2024-12-09
Payer: MEDICARE

## 2024-12-09 DIAGNOSIS — Z95.810 PRESENCE OF AUTOMATIC (IMPLANTABLE) CARDIAC DEFIBRILLATOR: ICD-10-CM

## 2024-12-09 DIAGNOSIS — I47.29 OTHER VENTRICULAR TACHYCARDIA: ICD-10-CM

## 2024-12-09 NOTE — PROGRESS NOTES
[employer] [facility] [employer]  Diet Recommendation 1: Heart Healthy  Diet Recommendation 2: Healthy Eating  Food Intolerance Avoidance: NKFA  Household Size: 2 Family Members  Interventions: Referral Number: 1st 6 Mo Referral 6 Mos  Interventions: Visit Number: 4 of 6 Visits - Max 6 Visits/Referral Each 6 Mo Period  Grains: 0-25% Whole  Fruit: 25-50% Fresh  Vegetables: % Fresh  Proteins: 1-2 Plant-based Items  Dairy: 25-50% Lowfat  Originating Site of Referral Order: Elmo Cano  Initials of RD Assisting Today: CHARLES

## 2024-12-30 ENCOUNTER — HOSPITAL ENCOUNTER (OUTPATIENT)
Dept: CARDIOLOGY | Facility: CLINIC | Age: 69
Discharge: HOME | End: 2024-12-30
Payer: MEDICARE

## 2024-12-30 DIAGNOSIS — I47.29 OTHER VENTRICULAR TACHYCARDIA: ICD-10-CM

## 2024-12-30 DIAGNOSIS — Z95.810 PRESENCE OF AUTOMATIC (IMPLANTABLE) CARDIAC DEFIBRILLATOR: ICD-10-CM

## 2025-01-06 ENCOUNTER — CLINICAL SUPPORT (OUTPATIENT)
Dept: NUTRITION | Facility: CLINIC | Age: 70
End: 2025-01-06
Payer: MEDICARE

## 2025-01-06 NOTE — PROGRESS NOTES
[facility]  Diet Recommendation 1: Heart Healthy  Diet Recommendation 2: Healthy Eating  Food Intolerance Avoidance: NKFA  Household Size: 2 Family Members  Interventions: Referral Number: 1st 6 Mo Referral 6 Mos  Interventions: Visit Number: 5 of 6 Visits - Max 6 Visits/Referral Each 6 Mo Period  Grains: 25-50% Whole  Fruit: 25-50% Fresh  Vegetables: % Fresh  Proteins: 1-2 Plant-based Items  Dairy: 25-50% Lowfat  Originating Site of Referral Order: Elmo Cano  Initials of RD Assisting Today: CHARLES

## 2025-01-07 ENCOUNTER — HOSPITAL ENCOUNTER (OUTPATIENT)
Dept: CARDIOLOGY | Facility: CLINIC | Age: 70
Discharge: HOME | End: 2025-01-07
Payer: MEDICARE

## 2025-01-07 DIAGNOSIS — I47.29 OTHER VENTRICULAR TACHYCARDIA: ICD-10-CM

## 2025-01-07 DIAGNOSIS — Z95.810 PRESENCE OF AUTOMATIC (IMPLANTABLE) CARDIAC DEFIBRILLATOR: ICD-10-CM

## 2025-01-08 DIAGNOSIS — N39.41 URGE INCONTINENCE OF URINE: ICD-10-CM

## 2025-01-08 RX ORDER — TROSPIUM CHLORIDE ER 60 MG/1
60 CAPSULE ORAL DAILY
Qty: 90 CAPSULE | Refills: 3 | Status: SHIPPED | OUTPATIENT
Start: 2025-01-08 | End: 2026-01-08

## 2025-01-14 ENCOUNTER — APPOINTMENT (OUTPATIENT)
Dept: CARDIOLOGY | Facility: CLINIC | Age: 70
End: 2025-01-14
Payer: MEDICARE

## 2025-01-22 ENCOUNTER — HOSPITAL ENCOUNTER (OUTPATIENT)
Dept: CARDIOLOGY | Facility: CLINIC | Age: 70
Discharge: HOME | End: 2025-01-22
Payer: MEDICARE

## 2025-01-22 DIAGNOSIS — R93.1 DECREASED CARDIAC EJECTION FRACTION: ICD-10-CM

## 2025-01-22 DIAGNOSIS — I47.29 OTHER VENTRICULAR TACHYCARDIA: ICD-10-CM

## 2025-01-22 DIAGNOSIS — Z95.810 PRESENCE OF AUTOMATIC (IMPLANTABLE) CARDIAC DEFIBRILLATOR: ICD-10-CM

## 2025-01-22 RX ORDER — DAPAGLIFLOZIN 10 MG/1
10 TABLET, FILM COATED ORAL
Qty: 90 TABLET | Refills: 3 | Status: SHIPPED | OUTPATIENT
Start: 2025-01-22

## 2025-01-23 ENCOUNTER — PHARMACY VISIT (OUTPATIENT)
Dept: PHARMACY | Facility: CLINIC | Age: 70
End: 2025-01-23
Payer: COMMERCIAL

## 2025-01-23 DIAGNOSIS — I50.9 HEART FAILURE, UNSPECIFIED HF CHRONICITY, UNSPECIFIED HEART FAILURE TYPE: ICD-10-CM

## 2025-01-23 PROCEDURE — RXMED WILLOW AMBULATORY MEDICATION CHARGE

## 2025-01-27 RX ORDER — FUROSEMIDE 20 MG/1
20 TABLET ORAL DAILY
Qty: 90 TABLET | Refills: 3 | Status: SHIPPED | OUTPATIENT
Start: 2025-01-27

## 2025-02-04 ENCOUNTER — HOSPITAL ENCOUNTER (OUTPATIENT)
Dept: CARDIOLOGY | Facility: CLINIC | Age: 70
Discharge: HOME | End: 2025-02-04
Payer: MEDICARE

## 2025-02-04 DIAGNOSIS — I47.29 OTHER VENTRICULAR TACHYCARDIA: ICD-10-CM

## 2025-02-04 DIAGNOSIS — Z95.810 PRESENCE OF AUTOMATIC (IMPLANTABLE) CARDIAC DEFIBRILLATOR: ICD-10-CM

## 2025-02-06 ENCOUNTER — CLINICAL SUPPORT (OUTPATIENT)
Dept: NUTRITION | Facility: CLINIC | Age: 70
End: 2025-02-06
Payer: MEDICARE

## 2025-02-06 NOTE — PROGRESS NOTES
[facility]  Diet Recommendation 1: Heart Healthy  Diet Recommendation 2: Sodium, Low  Diet Recommendation 3: Diabetes  Food Intolerance Avoidance: NKFA  Household Size: 2 Family Members  Interventions: Referral Number: 1st 6 Mo Referral 6 Mos  Interventions: Visit Number: 6 of 6 Visits - Max 6 Visits/Referral Each 6 Mo Period  Education Today: [employer] Meals  Recipes Today: verbal  Grains: 25-50% Whole  Fruit: Fresh - 100%  Vegetables: Fresh - 100%  Proteins: 0 Plant-based Items  Dairy: Lowfat - 100%  Relevant [facility] Inpatient Discharge Items: kalyani dr for new referral  Originating Site of Referral Order: Trina Floyd DO  Initials of RD Assisting Today: DON

## 2025-02-07 ENCOUNTER — TELEPHONE (OUTPATIENT)
Dept: PRIMARY CARE | Facility: CLINIC | Age: 70
End: 2025-02-07
Payer: MEDICARE

## 2025-02-07 DIAGNOSIS — Z59.41 FOOD INSECURITY: ICD-10-CM

## 2025-02-07 DIAGNOSIS — Z13.9 ENCOUNTER FOR SCREENING INVOLVING SOCIAL DETERMINANTS OF HEALTH (SDOH): Primary | ICD-10-CM

## 2025-02-07 SDOH — ECONOMIC STABILITY - FOOD INSECURITY: FOOD INSECURITY: Z59.41

## 2025-02-07 NOTE — TELEPHONE ENCOUNTER
----- Message from Carrie PLATT sent at 2025  1:16 PM EST -----  Regarding: [employer] Referral  [facility]!  Would it be possible for this patient to get a new referral to visit the [employer] Market at [facility]?  Their last referral has .    Kind regards,  Carrie Art

## 2025-02-11 ENCOUNTER — APPOINTMENT (OUTPATIENT)
Dept: PRIMARY CARE | Facility: CLINIC | Age: 70
End: 2025-02-11
Payer: MEDICARE

## 2025-02-11 ENCOUNTER — HOSPITAL ENCOUNTER (OUTPATIENT)
Dept: CARDIOLOGY | Facility: CLINIC | Age: 70
Discharge: HOME | End: 2025-02-11
Payer: MEDICARE

## 2025-02-11 ENCOUNTER — OFFICE VISIT (OUTPATIENT)
Dept: CARDIOLOGY | Facility: CLINIC | Age: 70
End: 2025-02-11
Payer: MEDICARE

## 2025-02-11 VITALS
SYSTOLIC BLOOD PRESSURE: 133 MMHG | DIASTOLIC BLOOD PRESSURE: 78 MMHG | HEART RATE: 85 BPM | BODY MASS INDEX: 37.43 KG/M2 | WEIGHT: 239 LBS | OXYGEN SATURATION: 96 %

## 2025-02-11 DIAGNOSIS — Z01.818 PREOP TESTING: ICD-10-CM

## 2025-02-11 DIAGNOSIS — I47.20 VT (VENTRICULAR TACHYCARDIA) (MULTI): ICD-10-CM

## 2025-02-11 DIAGNOSIS — Z95.810 PRESENCE OF AUTOMATIC CARDIOVERTER/DEFIBRILLATOR (AICD): ICD-10-CM

## 2025-02-11 PROCEDURE — 1159F MED LIST DOCD IN RCRD: CPT | Performed by: INTERNAL MEDICINE

## 2025-02-11 PROCEDURE — 1036F TOBACCO NON-USER: CPT | Performed by: INTERNAL MEDICINE

## 2025-02-11 PROCEDURE — 99214 OFFICE O/P EST MOD 30 MIN: CPT | Performed by: INTERNAL MEDICINE

## 2025-02-11 PROCEDURE — 93010 ELECTROCARDIOGRAM REPORT: CPT | Performed by: INTERNAL MEDICINE

## 2025-02-11 PROCEDURE — 1126F AMNT PAIN NOTED NONE PRSNT: CPT | Performed by: INTERNAL MEDICINE

## 2025-02-11 PROCEDURE — 3078F DIAST BP <80 MM HG: CPT | Performed by: INTERNAL MEDICINE

## 2025-02-11 PROCEDURE — 1123F ACP DISCUSS/DSCN MKR DOCD: CPT | Performed by: INTERNAL MEDICINE

## 2025-02-11 PROCEDURE — 3075F SYST BP GE 130 - 139MM HG: CPT | Performed by: INTERNAL MEDICINE

## 2025-02-11 PROCEDURE — 93005 ELECTROCARDIOGRAM TRACING: CPT

## 2025-02-11 ASSESSMENT — COLUMBIA-SUICIDE SEVERITY RATING SCALE - C-SSRS
6. HAVE YOU EVER DONE ANYTHING, STARTED TO DO ANYTHING, OR PREPARED TO DO ANYTHING TO END YOUR LIFE?: NO
1. IN THE PAST MONTH, HAVE YOU WISHED YOU WERE DEAD OR WISHED YOU COULD GO TO SLEEP AND NOT WAKE UP?: NO
2. HAVE YOU ACTUALLY HAD ANY THOUGHTS OF KILLING YOURSELF?: NO

## 2025-02-11 ASSESSMENT — PATIENT HEALTH QUESTIONNAIRE - PHQ9
1. LITTLE INTEREST OR PLEASURE IN DOING THINGS: NOT AT ALL
2. FEELING DOWN, DEPRESSED OR HOPELESS: NOT AT ALL
SUM OF ALL RESPONSES TO PHQ9 QUESTIONS 1 AND 2: 0

## 2025-02-11 ASSESSMENT — ENCOUNTER SYMPTOMS
LOSS OF SENSATION IN FEET: 1
DEPRESSION: 0
OCCASIONAL FEELINGS OF UNSTEADINESS: 1

## 2025-02-11 ASSESSMENT — PAIN SCALES - GENERAL: PAINLEVEL_OUTOF10: 0-NO PAIN

## 2025-02-11 NOTE — PROGRESS NOTES
Referred by Dr. Sanchez ref. provider found provider found for No chief complaint on file.       Jerri Ramirez is a 69 y.o. year old female patient with h/o ICM with HFpEF, HTN, DLP. She is s/p subQ ICD for primary prevention implant (LVEF 30-35%) by Dr. Rodgers in 2017. Her device is on advisory and and had battery depletion error alerts. Device needs to undergo change out. Was referred to me for the procedure.     PMHx/PSHx: As above    FamHx: unremarkable     Allergies:  Allergies   Allergen Reactions    Lisinopril Swelling and Angioedema    Lisinopril-Hydrochlorothiazide Other    Tramadol Other     irritates pancreas        Review of Systems    Constitutional: not feeling tired.   Eyes: no eyesight problems.   ENT: no hearing loss and no nosebleeds.   Cardiovascular: no intermittent leg claudication and as noted in HPI.   Respiratory: no chronic cough and no shortness of breath.   Gastrointestinal: no change in bowel habits and no blood in stools.   Genitourinary: no urinary frequency and no hematuria.   Skin: no skin rashes.   Neurological: no seizures and no frequent falls.   Psychiatric: no depression and not suicidal.   All other systems have been reviewed and are negative for complaint.     Outpatient Medications:  Current Outpatient Medications   Medication Instructions    albuterol 90 mcg/actuation inhaler USE 2 INHALATIONS EVERY 4 HOURS IF NEEDED FOR WHEEZING OR SHORTNESS OF BREATH    amLODIPine (NORVASC) 10 mg, oral, Daily    aspirin 81 mg EC tablet 1 tablet, oral, Daily    atorvastatin (Lipitor) 80 mg tablet TAKE 1 TABLET AT BEDTIME    carvedilol (Coreg) 25 mg tablet TAKE 1 TABLET TWICE A DAY    diclofenac sodium (VOLTAREN) 2 g, Topical, 4 times daily, Apply to upper extremities, to affected area. Do not apply more than 8 GM daily to any one affected joint    docusate sodium 250 mg, oral, Daily    Farxiga 10 mg, oral, Daily before breakfast    fluticasone (Flonase) 50 mcg/actuation nasal spray 2 sprays,  "Each Nostril, Daily    furosemide (LASIX) 20 mg, oral, Daily, As directed    gabapentin (NEURONTIN) 1,200 mg, oral, 3 times daily    MagOx 400 mg, oral, Daily    omeprazole (PRILOSEC) 20 mg, oral, Daily before breakfast    trospium (SANCTURA XR) 60 mg, oral, Daily         Last Recorded Vitals:      8/5/2024    11:48 AM 8/13/2024    10:30 AM 8/13/2024    11:09 AM 9/27/2024    10:29 AM 10/3/2024    10:07 AM 10/3/2024    10:10 AM 11/12/2024    11:34 AM   Vitals   Systolic 146 148 126 145 150 130 130   Diastolic 95 86 70 86 85 83 80   BP Location    Right arm      Heart Rate  77  84 84  84   Temp  36.4 °C (97.6 °F)   35.9 °C (96.6 °F)  36.1 °C (97 °F)   Resp  15   16  18   Height  1.702 m (5' 7\")  1.702 m (5' 7\") 1.702 m (5' 7\")  1.702 m (5' 7\")   Weight (lb)  248.3  239.9 238.2  238   BMI  38.89 kg/m2  37.57 kg/m2 37.31 kg/m2  37.28 kg/m2   BSA (m2)  2.31 m2  2.27 m2 2.26 m2  2.26 m2   Visit Report  Report Report Report Report Report Report    Visit Vitals  OB Status Postmenopausal   Smoking Status Never        Physical Exam:  Constitutional: alert and in no acute distress.   Eyes: no erythema, swelling or discharge from the eye .   Neck: neck is supple, symmetric, trachea midline, no masses  and no thyromegaly .   Pulmonary: no increased work of breathing or signs of respiratory distress  and lungs clear to auscultation.    Cardiovascular: carotid pulses 2+ bilaterally with no bruit , JVP was normal, no thrills , regular rhythm, normal S1 and S2, no murmurs , pedal pulses 2+ bilaterally  and no edema .   Abdomen: abdomen non-tender, no masses  and no hepatomegaly .   Skin: skin warm and dry, normal skin turgor .   Psychiatric judgment and insight is normal  and oriented to person, place and time .        Assessment/Plan   Problem List Items Addressed This Visit    None      Jerri Ramirez is a 69 y.o. year old female patient with h/o ICM with HFpEF, HTN, DLP. She is s/p subQ ICD implant by Dr. Rodgers in 2017. Her " device is on advisory and and had battery depletion error alerts. Device needs to undergo change out. Was referred to me for the procedure.   Her current ECG shows NSR with narrow QRS and HR of 85 bpm. All the R/B/A of the procedure were discussed with the patient who expressed understanding and agrees to proceed. Will get her schedule for device change out.         Ritchie Navarrete MD  Cardiac Electrophysiology      Thank you very much for allowing me to participate in the care of this pleasant patient. Please do not hesitate to contact me with any further questions or concerns regarding his care.    **Disclaimer: This note was dictated by speech recognition, and every effort has been made to prevent any error in transcription, however minor errors may be present**

## 2025-02-17 LAB
ATRIAL RATE: 85 BPM
P AXIS: 47 DEGREES
P OFFSET: 202 MS
P ONSET: 145 MS
PR INTERVAL: 170 MS
Q ONSET: 230 MS
QRS COUNT: 14 BEATS
QRS DURATION: 76 MS
QT INTERVAL: 372 MS
QTC CALCULATION(BAZETT): 442 MS
QTC FREDERICIA: 417 MS
R AXIS: -40 DEGREES
T AXIS: 104 DEGREES
T OFFSET: 416 MS
VENTRICULAR RATE: 85 BPM

## 2025-02-25 ENCOUNTER — HOSPITAL ENCOUNTER (OUTPATIENT)
Dept: CARDIOLOGY | Facility: CLINIC | Age: 70
Discharge: HOME | End: 2025-02-25
Payer: MEDICARE

## 2025-02-25 DIAGNOSIS — Z95.810 PRESENCE OF AUTOMATIC CARDIOVERTER/DEFIBRILLATOR (AICD): ICD-10-CM

## 2025-02-25 DIAGNOSIS — I47.20 VT (VENTRICULAR TACHYCARDIA) (MULTI): ICD-10-CM

## 2025-02-25 DIAGNOSIS — Z01.818 PREOP TESTING: ICD-10-CM

## 2025-02-25 PROCEDURE — 93296 REM INTERROG EVL PM/IDS: CPT

## 2025-02-25 PROCEDURE — 93295 DEV INTERROG REMOTE 1/2/MLT: CPT | Performed by: INTERNAL MEDICINE

## 2025-02-28 PROCEDURE — RXMED WILLOW AMBULATORY MEDICATION CHARGE

## 2025-03-03 ENCOUNTER — PHARMACY VISIT (OUTPATIENT)
Dept: PHARMACY | Facility: CLINIC | Age: 70
End: 2025-03-03
Payer: COMMERCIAL

## 2025-03-03 DIAGNOSIS — M47.812 CERVICAL SPONDYLOSIS: ICD-10-CM

## 2025-03-03 RX ORDER — GABAPENTIN 600 MG/1
TABLET ORAL
Qty: 180 TABLET | Refills: 6 | Status: SHIPPED | OUTPATIENT
Start: 2025-03-03

## 2025-03-04 ENCOUNTER — HOSPITAL ENCOUNTER (OUTPATIENT)
Dept: CARDIOLOGY | Facility: CLINIC | Age: 70
Discharge: HOME | End: 2025-03-04
Payer: MEDICARE

## 2025-03-04 DIAGNOSIS — Z95.810 PRESENCE OF AUTOMATIC CARDIOVERTER/DEFIBRILLATOR (AICD): ICD-10-CM

## 2025-03-04 DIAGNOSIS — I47.20 VENTRICULAR TACHYCARDIA (MULTI): ICD-10-CM

## 2025-03-05 ENCOUNTER — CLINICAL SUPPORT (OUTPATIENT)
Dept: NUTRITION | Facility: CLINIC | Age: 70
End: 2025-03-05
Payer: MEDICARE

## 2025-03-05 NOTE — PROGRESS NOTES
[facility]  Diet Recommendation 1: Heart Healthy  Diet Recommendation 2: Sodium, Low  Diet Recommendation 3: Diabetes  Food Intolerance Avoidance: NKFA  Household Size: 2 Family Members  Interventions: Referral Number: 2nd 6 Mo Referral 1 yr (Referrals may not be consecutive)  Interventions: Visit Number: 1 of 6 Visits - Max 6 Visits/Referral Each 6 Mo Period  Education Today: Healthy Recipes  Recipes Today: verbal  Grains: 25-50% Whole  Fruit: Fresh - 100%  Vegetables: Fresh - 100%  Proteins: 0 Plant-based Items  Dairy: Lowfat - 100%  Relevant [facility] Inpatient Discharge Items: -  Originating Site of Referral Order: Trina Folyd DO  Initials of RD Assisting Today: DON

## 2025-03-17 ENCOUNTER — HOSPITAL ENCOUNTER (OUTPATIENT)
Dept: CARDIOLOGY | Facility: CLINIC | Age: 70
Discharge: HOME | End: 2025-03-17
Payer: MEDICARE

## 2025-03-17 DIAGNOSIS — I47.29 OTHER VENTRICULAR TACHYCARDIA: ICD-10-CM

## 2025-03-17 DIAGNOSIS — Z95.810 PRESENCE OF AUTOMATIC (IMPLANTABLE) CARDIAC DEFIBRILLATOR: ICD-10-CM

## 2025-03-25 ENCOUNTER — HOSPITAL ENCOUNTER (OUTPATIENT)
Dept: CARDIOLOGY | Facility: CLINIC | Age: 70
Discharge: HOME | End: 2025-03-25
Payer: MEDICARE

## 2025-03-25 DIAGNOSIS — M17.12 PRIMARY OSTEOARTHRITIS OF LEFT KNEE: ICD-10-CM

## 2025-03-25 DIAGNOSIS — Z95.810 PRESENCE OF AUTOMATIC (IMPLANTABLE) CARDIAC DEFIBRILLATOR: ICD-10-CM

## 2025-03-25 DIAGNOSIS — I47.29 OTHER VENTRICULAR TACHYCARDIA: ICD-10-CM

## 2025-03-25 RX ORDER — DICLOFENAC SODIUM 10 MG/G
GEL TOPICAL
Qty: 300 G | Refills: 0 | Status: SHIPPED | OUTPATIENT
Start: 2025-03-25

## 2025-03-31 ENCOUNTER — CLINICAL SUPPORT (OUTPATIENT)
Dept: NUTRITION | Facility: CLINIC | Age: 70
End: 2025-03-31
Payer: MEDICARE

## 2025-03-31 NOTE — PROGRESS NOTES
[facility] [employer]  Diet Recommendation 1: Heart Healthy  Diet Recommendation 2: Sodium, Low  Diet Recommendation 3: Diabetes  Food Intolerance Avoidance: NKFA  Household Size: 2 Family Members  Interventions: Referral Number: 2nd 6 Mo Referral 1 yr (Referrals may not be consecutive)  Interventions: Visit Number: 2 of 6 Visits - Max 6 Visits/Referral Each 6 Mo Period  Education Today: Healthy Recipes  Recipes Today: verbal  Grains: 25-50% Whole  Fruit: 25-50% Fresh  Vegetables: % Fresh  Proteins: 0 Plant-based Items  Dairy: 0-25% Lowfat  Relevant [facility] Inpatient Discharge Items: -  Originating Site of Referral Order: Trina Floyd DO  Initials of RD Assisting Today: DON

## 2025-04-03 ENCOUNTER — APPOINTMENT (OUTPATIENT)
Dept: NUTRITION | Facility: CLINIC | Age: 70
End: 2025-04-03
Payer: MEDICARE

## 2025-04-07 DIAGNOSIS — E78.5 HYPERLIPIDEMIA, UNSPECIFIED HYPERLIPIDEMIA TYPE: ICD-10-CM

## 2025-04-07 DIAGNOSIS — I25.10 CORONARY ARTERY DISEASE INVOLVING NATIVE CORONARY ARTERY OF NATIVE HEART WITHOUT ANGINA PECTORIS: ICD-10-CM

## 2025-04-07 DIAGNOSIS — I73.9 PAD (PERIPHERAL ARTERY DISEASE) (CMS-HCC): ICD-10-CM

## 2025-04-08 ENCOUNTER — HOSPITAL ENCOUNTER (OUTPATIENT)
Dept: CARDIOLOGY | Facility: CLINIC | Age: 70
Discharge: HOME | End: 2025-04-08
Payer: MEDICARE

## 2025-04-08 DIAGNOSIS — I47.29 OTHER VENTRICULAR TACHYCARDIA: ICD-10-CM

## 2025-04-08 DIAGNOSIS — Z95.810 PRESENCE OF AUTOMATIC (IMPLANTABLE) CARDIAC DEFIBRILLATOR: ICD-10-CM

## 2025-04-08 PROCEDURE — RXMED WILLOW AMBULATORY MEDICATION CHARGE

## 2025-04-09 ENCOUNTER — PHARMACY VISIT (OUTPATIENT)
Dept: PHARMACY | Facility: CLINIC | Age: 70
End: 2025-04-09
Payer: COMMERCIAL

## 2025-04-09 RX ORDER — ATORVASTATIN CALCIUM 80 MG/1
TABLET, FILM COATED ORAL
Qty: 90 TABLET | Refills: 3 | Status: SHIPPED | OUTPATIENT
Start: 2025-04-09

## 2025-04-10 ENCOUNTER — APPOINTMENT (OUTPATIENT)
Dept: NUTRITION | Facility: CLINIC | Age: 70
End: 2025-04-10
Payer: MEDICARE

## 2025-04-10 LAB
ANION GAP SERPL CALCULATED.4IONS-SCNC: 15 MMOL/L (CALC) (ref 7–17)
BUN SERPL-MCNC: 16 MG/DL (ref 7–25)
BUN/CREAT SERPL: ABNORMAL (CALC) (ref 6–22)
CALCIUM SERPL-MCNC: 9.9 MG/DL (ref 8.6–10.4)
CHLORIDE SERPL-SCNC: 103 MMOL/L (ref 98–110)
CO2 SERPL-SCNC: 24 MMOL/L (ref 20–32)
CREAT SERPL-MCNC: 0.77 MG/DL (ref 0.6–1)
EGFRCR SERPLBLD CKD-EPI 2021: 83 ML/MIN/1.73M2
ERYTHROCYTE [DISTWIDTH] IN BLOOD BY AUTOMATED COUNT: 13 % (ref 11–15)
GLUCOSE SERPL-MCNC: 114 MG/DL (ref 65–99)
HCT VFR BLD AUTO: 37.9 % (ref 35–45)
HGB BLD-MCNC: 12.2 G/DL (ref 11.7–15.5)
MCH RBC QN AUTO: 28.7 PG (ref 27–33)
MCHC RBC AUTO-ENTMCNC: 32.2 G/DL (ref 32–36)
MCV RBC AUTO: 89.2 FL (ref 80–100)
PLATELET # BLD AUTO: 315 THOUSAND/UL (ref 140–400)
PMV BLD REES-ECKER: 9.5 FL (ref 7.5–12.5)
POTASSIUM SERPL-SCNC: 4.5 MMOL/L (ref 3.5–5.3)
RBC # BLD AUTO: 4.25 MILLION/UL (ref 3.8–5.1)
SODIUM SERPL-SCNC: 142 MMOL/L (ref 135–146)
WBC # BLD AUTO: 6.9 THOUSAND/UL (ref 3.8–10.8)

## 2025-04-16 ENCOUNTER — PHARMACY VISIT (OUTPATIENT)
Dept: PHARMACY | Facility: CLINIC | Age: 70
End: 2025-04-16
Payer: COMMERCIAL

## 2025-04-16 ENCOUNTER — HOSPITAL ENCOUNTER (OUTPATIENT)
Facility: HOSPITAL | Age: 70
Setting detail: OUTPATIENT SURGERY
Discharge: HOME | End: 2025-04-16
Attending: INTERNAL MEDICINE | Admitting: INTERNAL MEDICINE
Payer: MEDICARE

## 2025-04-16 ENCOUNTER — APPOINTMENT (OUTPATIENT)
Dept: CARDIOLOGY | Facility: HOSPITAL | Age: 70
End: 2025-04-16
Payer: MEDICARE

## 2025-04-16 VITALS
DIASTOLIC BLOOD PRESSURE: 90 MMHG | BODY MASS INDEX: 37.37 KG/M2 | OXYGEN SATURATION: 100 % | SYSTOLIC BLOOD PRESSURE: 173 MMHG | RESPIRATION RATE: 16 BRPM | HEIGHT: 67 IN | WEIGHT: 238.1 LBS | HEART RATE: 72 BPM | TEMPERATURE: 98.4 F

## 2025-04-16 DIAGNOSIS — I25.5 ISCHEMIC CARDIOMYOPATHY: Primary | ICD-10-CM

## 2025-04-16 DIAGNOSIS — Z95.810 AICD (AUTOMATIC CARDIOVERTER/DEFIBRILLATOR) PRESENT: ICD-10-CM

## 2025-04-16 DIAGNOSIS — T82.118A ICD (IMPLANTABLE CARDIOVERTER-DEFIBRILLATOR) MALFUNCTION: ICD-10-CM

## 2025-04-16 DIAGNOSIS — I47.20 PAROXYSMAL VT: ICD-10-CM

## 2025-04-16 DIAGNOSIS — I73.9 PERIPHERAL VASCULAR DISEASE, UNSPECIFIED (CMS-HCC): ICD-10-CM

## 2025-04-16 LAB — BODY SURFACE AREA: 2.26 M2

## 2025-04-16 PROCEDURE — C1889 IMPLANT/INSERT DEVICE, NOC: HCPCS | Performed by: INTERNAL MEDICINE

## 2025-04-16 PROCEDURE — 99153 MOD SED SAME PHYS/QHP EA: CPT | Performed by: INTERNAL MEDICINE

## 2025-04-16 PROCEDURE — 7100000010 HC PHASE TWO TIME - EACH INCREMENTAL 1 MINUTE: Performed by: INTERNAL MEDICINE

## 2025-04-16 PROCEDURE — 99152 MOD SED SAME PHYS/QHP 5/>YRS: CPT | Performed by: INTERNAL MEDICINE

## 2025-04-16 PROCEDURE — 33241 REMOVE PULSE GENERATOR: CPT | Performed by: INTERNAL MEDICINE

## 2025-04-16 PROCEDURE — 33272 RMVL OF SUBQ DEFIBRILLATOR: CPT | Performed by: INTERNAL MEDICINE

## 2025-04-16 PROCEDURE — 7100000009 HC PHASE TWO TIME - INITIAL BASE CHARGE: Performed by: INTERNAL MEDICINE

## 2025-04-16 PROCEDURE — 33240 INSRT PULSE GEN W/SINGL LEAD: CPT | Performed by: INTERNAL MEDICINE

## 2025-04-16 PROCEDURE — 33270 INS/REP SUBQ DEFIBRILLATOR: CPT | Performed by: INTERNAL MEDICINE

## 2025-04-16 PROCEDURE — 2750000001 HC OR 275 NO HCPCS: Performed by: INTERNAL MEDICINE

## 2025-04-16 PROCEDURE — 99222 1ST HOSP IP/OBS MODERATE 55: CPT | Performed by: NURSE PRACTITIONER

## 2025-04-16 PROCEDURE — 2780000003 HC OR 278 NO HCPCS: Performed by: INTERNAL MEDICINE

## 2025-04-16 PROCEDURE — RXMED WILLOW AMBULATORY MEDICATION CHARGE

## 2025-04-16 PROCEDURE — 2500000004 HC RX 250 GENERAL PHARMACY W/ HCPCS (ALT 636 FOR OP/ED): Performed by: INTERNAL MEDICINE

## 2025-04-16 PROCEDURE — C1722 AICD, SINGLE CHAMBER: HCPCS | Performed by: INTERNAL MEDICINE

## 2025-04-16 PROCEDURE — C1781 MESH (IMPLANTABLE): HCPCS | Performed by: INTERNAL MEDICINE

## 2025-04-16 PROCEDURE — 33262 RMVL& REPLC PULSE GEN 1 LEAD: CPT | Performed by: INTERNAL MEDICINE

## 2025-04-16 DEVICE — SUBCUTANEOUS IMPLANTABLE CARDIOVERTER DEFIBRILLATOR
Type: IMPLANTABLE DEVICE | Site: AXILLA | Status: FUNCTIONAL
Brand: EMBLEM™ MRI S-ICD

## 2025-04-16 RX ORDER — ONDANSETRON HYDROCHLORIDE 2 MG/ML
4 INJECTION, SOLUTION INTRAVENOUS EVERY 8 HOURS PRN
Status: DISCONTINUED | OUTPATIENT
Start: 2025-04-16 | End: 2025-04-16 | Stop reason: HOSPADM

## 2025-04-16 RX ORDER — CEFAZOLIN SODIUM 2 G/50ML
2 SOLUTION INTRAVENOUS ONCE
Status: DISCONTINUED | OUTPATIENT
Start: 2025-04-16 | End: 2025-04-16 | Stop reason: HOSPADM

## 2025-04-16 RX ORDER — DEXTROSE 50 % IN WATER (D50W) INTRAVENOUS SYRINGE
25
Status: DISCONTINUED | OUTPATIENT
Start: 2025-04-16 | End: 2025-04-16 | Stop reason: HOSPADM

## 2025-04-16 RX ORDER — CEFADROXIL 500 MG/1
500 CAPSULE ORAL 2 TIMES DAILY
Status: DISCONTINUED | OUTPATIENT
Start: 2025-04-16 | End: 2025-04-16 | Stop reason: HOSPADM

## 2025-04-16 RX ORDER — CEFADROXIL 500 MG/1
500 CAPSULE ORAL 2 TIMES DAILY
Qty: 14 CAPSULE | Refills: 0 | Status: SHIPPED | OUTPATIENT
Start: 2025-04-16 | End: 2025-04-23

## 2025-04-16 RX ORDER — ONDANSETRON 4 MG/1
4 TABLET, FILM COATED ORAL EVERY 8 HOURS PRN
Status: DISCONTINUED | OUTPATIENT
Start: 2025-04-16 | End: 2025-04-16 | Stop reason: HOSPADM

## 2025-04-16 RX ORDER — ACETAMINOPHEN 160 MG/5ML
650 SOLUTION ORAL EVERY 4 HOURS PRN
Status: DISCONTINUED | OUTPATIENT
Start: 2025-04-16 | End: 2025-04-16 | Stop reason: HOSPADM

## 2025-04-16 RX ORDER — DEXTROSE 50 % IN WATER (D50W) INTRAVENOUS SYRINGE
12.5
Status: DISCONTINUED | OUTPATIENT
Start: 2025-04-16 | End: 2025-04-16 | Stop reason: HOSPADM

## 2025-04-16 RX ORDER — LIDOCAINE HYDROCHLORIDE 10 MG/ML
INJECTION, SOLUTION EPIDURAL; INFILTRATION; INTRACAUDAL; PERINEURAL AS NEEDED
Status: DISCONTINUED | OUTPATIENT
Start: 2025-04-16 | End: 2025-04-16 | Stop reason: HOSPADM

## 2025-04-16 RX ORDER — FENTANYL CITRATE 50 UG/ML
INJECTION, SOLUTION INTRAMUSCULAR; INTRAVENOUS AS NEEDED
Status: DISCONTINUED | OUTPATIENT
Start: 2025-04-16 | End: 2025-04-16 | Stop reason: HOSPADM

## 2025-04-16 RX ORDER — CEFAZOLIN SODIUM 1 G/50ML
SOLUTION INTRAVENOUS CONTINUOUS PRN
Status: COMPLETED | OUTPATIENT
Start: 2025-04-16 | End: 2025-04-16

## 2025-04-16 RX ORDER — OXYCODONE HYDROCHLORIDE 5 MG/1
5 TABLET ORAL EVERY 6 HOURS PRN
Refills: 0 | Status: DISCONTINUED | OUTPATIENT
Start: 2025-04-16 | End: 2025-04-16 | Stop reason: HOSPADM

## 2025-04-16 RX ORDER — ACETAMINOPHEN 325 MG/1
650 TABLET ORAL EVERY 4 HOURS PRN
Status: DISCONTINUED | OUTPATIENT
Start: 2025-04-16 | End: 2025-04-16 | Stop reason: HOSPADM

## 2025-04-16 RX ORDER — MIDAZOLAM HYDROCHLORIDE 1 MG/ML
INJECTION, SOLUTION INTRAMUSCULAR; INTRAVENOUS AS NEEDED
Status: DISCONTINUED | OUTPATIENT
Start: 2025-04-16 | End: 2025-04-16 | Stop reason: HOSPADM

## 2025-04-16 RX ORDER — ACETAMINOPHEN 650 MG/1
650 SUPPOSITORY RECTAL EVERY 4 HOURS PRN
Status: DISCONTINUED | OUTPATIENT
Start: 2025-04-16 | End: 2025-04-16 | Stop reason: HOSPADM

## 2025-04-16 ASSESSMENT — PAIN - FUNCTIONAL ASSESSMENT
PAIN_FUNCTIONAL_ASSESSMENT: 0-10

## 2025-04-16 ASSESSMENT — ENCOUNTER SYMPTOMS
EYES NEGATIVE: 1
ALLERGIC/IMMUNOLOGIC NEGATIVE: 1
NEUROLOGICAL NEGATIVE: 1
HEMATOLOGIC/LYMPHATIC NEGATIVE: 1
PSYCHIATRIC NEGATIVE: 1
CARDIOVASCULAR NEGATIVE: 1
CONSTITUTIONAL NEGATIVE: 1
GASTROINTESTINAL NEGATIVE: 1
MUSCULOSKELETAL NEGATIVE: 1
ENDOCRINE NEGATIVE: 1
RESPIRATORY NEGATIVE: 1

## 2025-04-16 ASSESSMENT — PAIN SCALES - GENERAL
PAINLEVEL_OUTOF10: 0 - NO PAIN

## 2025-04-16 NOTE — Clinical Note
Patient Clipped and Prepped: left chest, left abdomen, left neck and left subclavian. Prepped with ChloraPrep, a minimum of 3 minute dry time, longer if needed, no pooling noted, patient draped in sterile fashion.

## 2025-04-16 NOTE — Clinical Note
The GENERATOR, Odyssey Mobile Interaction S-ICD, MRI PULSE - [id number] device was inserted. The leads were placed into the connector and visually verified to be in correct position.

## 2025-04-16 NOTE — DISCHARGE INSTRUCTIONS
[facility]  Home-going Instructions after Sub-Q Defibrillator (ICD) Implant      START CEFADROXIL 500 MG TWICE DAILY STARTING TONIGHT 4/16/25. STOP DATE 4/23/25. THIS IS AN ANTIBIOTIC TO HELP PREVENT INFECTION FOLLOWING GENERATOR CHANGE OUT.     Incision:  Please keep your incisions dry and open to air for one week after implant (4/23/25)  After one week, you may wash the site with soap and water and remove paper steri strips that cover your incision.   Inspect your incision each day.  If you note increased redness, swelling, or drainage, or if you develop a fever, please call your doctor IMMEDIATELY.      Your Doctor:Dr. Navarrete    Telephone: [phone]    Pain:  It is normal for the area around the incision to be tender for a few weeks following surgery.  Pain relievers such as Tylenol or Motrin are usually sufficient for pain relief.  The pain should get better each day, if it gets worse, please contact your ICD doctor.    Activity - You may resume normal activity as you are able to do so.  Please avoid placing stress on the incisions, especially the incision that is over the ICD implantation site.      ID Card:  It is important that you carry your defibrillator ID card with you at all times.  Inform all doctors and healthcare providers that you have a defibrillator.    Electromagnetic Interference:  Microwave ovens are safe to use.  Cellular telephones should be held to the ear that is opposite your ICD.  Present your ICD ID card to airport security and ask to be hand searched.  The detector wand may be used below waist level and to inspect your shoes.  Read the patient booklet for more information.  You may call the device clinic or the patient services department of the ICD  with questions about specific electrical appliances and interference problems.    Please keep your defibrillator doctor informed about changes in your incision or how you are feeling.      It is your  responsibility to make and keep appointments. After each visit on your way out, make an appointment for your next visit. Please follow the recommendations of your doctor for the frequency of appointments.          [facility] [id number]   [address] [place] [address]    [facility] [address]  [address] [place] [address]    [facility] [place] [facility]  [address] [place] [address]    [facility] [address]   [address] [place] [address]     [facility]   [address] [place] [address]    [facility] 1st Floor  [address], [place] [address]    [facility] [address]   [address] [place] [address]    [facility] [address]  [address] [place] [address]    [facility]  [address]  [place], [place] [address]    [place] [facility] [address]   [address] [place] [address]     [facility] [address]  [address] [place] [address]    [facility] [address]   [address] [place] [address]     [facility] [address]  [address] [place] [address]    Appointment [phone]   Device Clinic: [phone] (this is Not an emergency number)      Frequent Questions from ICD Patients    What Does a Shock Feel Like?  Many patients describe a shock as a hard punch or kick to the chest.  The discomfort is over quickly and does not remain like an actual punch or kick.    What Should I Do If I Get a Shock?  Remain calm -your ICD is working.  If you are feeling well after your shock, call your ICD doctor.  If you are not feeling well, call 911 and seek emergency treatment.    Family members should also know what to do if you get a shock.  Establish an emergency plan with family members and friends.  Instruction in CPR is recommended.    Can I Travel?  -Always carry  your ICD identification card.  -Ask to be hand searched at security checkpoints, such as at the airport or your local courthouse.  Hand wands are not a substitute for a “pat down” search.  Surveillance wands may be used below your waist and on your shoes.  -If you are making an extended trip to another city, your ICD doctor may recommend a physician or hospital that you can contact.    Can I Drive?  -Your doctor will specify any driving restrictions.    Other Points:  -Always notify healthcare providers, such as dentists and podiatrists, that you have an ICD.  If any surgery is planned for you, the anesthesia personnel must know in advance about your ICD.    Other Sources of Information:  Device Clinic Nurses:  [phone]  Patient Services Department of the  of your ICD  The Internet holds an abundance of information.  Some internet sites of interest:  www.for; to (do) Centers  www.[employer]  www.[employer]  www.[employer]  [email or web address]/zapper/      This info is a general resource. It is not meant to replace your health care provider’s advice.  Ask your doctor or health care team any questions. Always follow their instructions.

## 2025-04-16 NOTE — H&P
History Of Present Illness  Jerri Ramirez is a 69 y.o. female presenting for S-ICD generator change out in the setting of battery depletion error on her device and device advisory. PMHx significant for CAD s/p MI 2017 with PCI LAD,  HFrEF with improved systolic fx, ICM s/p S-ICD 2017 by Dr. Rodgers,  HTN, DLD, DM2.   Past Medical History:  Medical History[1]     Past Surgical History:  Surgical History[2]       Social History:  Social History[3]    Family History:  Family History[4]     Allergies:  RX Allergies[5]     Home Medications:  Current Outpatient Medications   Medication Instructions    albuterol 90 mcg/actuation inhaler USE 2 INHALATIONS EVERY 4 HOURS IF NEEDED FOR WHEEZING OR SHORTNESS OF BREATH    amLODIPine (NORVASC) 10 mg, oral, Daily    aspirin 81 mg EC tablet 1 tablet, Daily    atorvastatin (Lipitor) 80 mg tablet TAKE 1 TABLET AT BEDTIME    carvedilol (Coreg) 25 mg tablet TAKE 1 TABLET TWICE A DAY    diclofenac sodium (Voltaren) 1 % gel USE AS INSTRUCTED BY YOUR PRESCRIBER    docusate sodium 250 mg, oral, Daily    Farxiga 10 mg, oral, Daily before breakfast    fluticasone (Flonase) 50 mcg/actuation nasal spray 2 sprays, Each Nostril, Daily    furosemide (LASIX) 20 mg, oral, Daily, As directed    gabapentin (Neurontin) 600 mg tablet TAKE 2 TABLETS(1200 MG) BY MOUTH THREE TIMES DAILY    MagOx 400 mg, oral, Daily    omeprazole (PRILOSEC) 20 mg, oral, Daily before breakfast    trospium (SANCTURA XR) 60 mg, oral, Daily       Inpatient Medications:  Scheduled Medications[6]  PRN Medications[7]  Continuous Medications[8]      Review of Systems   Constitutional: Negative.    HENT: Negative.     Eyes: Negative.    Respiratory: Negative.     Cardiovascular: Negative.    Gastrointestinal: Negative.    Endocrine: Negative.    Genitourinary: Negative.    Musculoskeletal: Negative.    Skin: Negative.    Allergic/Immunologic: Negative.    Neurological: Negative.    Hematological: Negative.   "  Psychiatric/Behavioral: Negative.            Physical Exam  General:  Patient is awake, alert, and oriented.  Patient is in no acute distress.  HEENT:  Pupils equal and reactive.  Normocephalic.  Moist mucosa.    Neck:  No JVD.   Cardiovascular:  Regular rate and rhythm.  Normal S1 and S2. No murmurs/rubs/gallops. Radial pulses 2+.   Pulmonary:  Clear to auscultation bilaterally.  Abdomen:  Soft. Non-tender.   Non-distended.  Positive bowel sounds.  Lower Extremities:  Pedal pulses 2+ No LE edema.  Neurologic:  Cranial nerves II-XII grossly intact.   No focal deficit.   Skin: Skin warm and dry, no lesions. Normal skin turgor.   Psychiatric: Normal affect.     Sedation Plan    ASA 3     Mallampati class: II.    Risks, benefits, and alternatives discussed with patient.         NPO since 2000 4/15/25    Last Recorded Vitals  Blood pressure 176/89, pulse 82, temperature 36.9 °C (98.4 °F), height 1.702 m (5' 7\"), weight 108 kg (238 lb 1.6 oz), SpO2 97%.         Vitals from the Past 24 Hours  Heart Rate:  [82]   Temp:  [36.9 °C (98.4 °F)]   BP: (176)/(89)   Height:  [170.2 cm (5' 7\")]   Weight:  [108 kg (238 lb 1.6 oz)]   SpO2:  [97 %]          Relevant Results    Labs    POCT Glucose:      POCT Urine Pregnancy:       CBC:   Recent Labs     04/09/25  1145 08/13/24  1222 02/07/24  1121 03/16/23  1114 02/22/23  1134 06/30/21  0955   WBC 6.9 8.0 7.3 7.6 6.3 6.8   HGB 12.2 12.8 12.9 12.1 12.0 11.5*   HCT 37.9 41.5 41.1 39.4 40.1 38.7    349 318 313 352 331   MCV 89.2 90 93 92 93 95     BMP/CMP:   Recent Labs     04/09/25  1145 08/13/24  1222 02/07/24  1121 03/16/23  1114 02/22/23  1134 04/08/22  0943 02/16/22  0000 06/29/20  1117 11/13/19  1451 10/20/19  1208 10/19/19  1702 10/19/19  0042 10/18/19  1635    143 142 139 143 139  --    < > 140   < > 131* 140 134*   K 4.5 4.3 4.4 4.2 4.4 4.7  --    < > 4.5   < > 4.8 2.2* 3.4*    105 104 103 107 104  --    < > 102   < > 101 115* 101   BUN 16 13 15 11 13 16  -- " "   < > 11   < > 10 8 10   CREATININE 0.77 0.71 0.83 0.81 0.72 0.90  --    < > 0.80   < > 0.74 0.46* 0.69   CO2 24 28 28 30 27 28  --    < > 27   < > 23 16* 20*   CALCIUM 9.9 9.9 10.1 9.7 9.6 9.9  --    < > 10.1   < > 8.1* 5.0* 7.5*   PROT  --   --  7.1  --  6.9  --   --   --  7.0  --  6.2* 3.7* 5.9*   BILITOT  --   --  0.4  --  0.3  --   --   --  0.5  --  0.6 0.4 0.7   ALKPHOS  --   --  119  --  112  --   --   --  94  --  82 52 72   ALT  --   --  16  --  16  --  14  --  10  --  10 7 11   AST  --   --  17  --  18  --  16  --  13  --  16 11 21   GLUCOSE 114* 105* 126* 111* 108* 119*  --    < > 83   < > 94 73* 133*    < > = values in this interval not displayed.      Magnesium:   Recent Labs     11/29/22  1137 11/13/19  1451 10/25/19  0620 10/24/19  0651 10/23/19  1700 10/23/19  0556   MG 1.70 1.47* 1.40* 1.50* 1.90 1.00*     Lipid Panel:   Recent Labs     08/13/24  1222 02/07/24  1121 02/22/23  1134 02/16/22  0000 10/19/19  0042 06/27/19  1129 04/23/18  0914   CHOL 176 175 150 157 127 165 183   HDL 51.1 52.5 53.0 48.6 42.3 56.8 75.4   CHHDL 3.4 3.3 2.8 3.2 3.0 2.9 2.4   VLDL 27 27 22 16 14 19 18   TRIG 134 137 108 80 70 97 92   NHDL 125 123  --   --   --   --   --      Cardiac       No lab exists for component: \"CK\", \"CKMBP\"   Hemoglobin A1C:   Recent Labs     08/13/24  1222 02/07/24  1121 02/22/23  1134   HGBA1C 6.6* 6.8* 6.2*     TSH/ Free T4:   Recent Labs     02/07/24  1121 02/22/23  1134 10/18/17  1245   TSH 1.09 1.41 1.70     Iron:   Recent Labs     08/13/24  1222 04/08/22  0943 11/03/17  0911 10/16/17  1058   * 114* 163* 558*     Coag:     ABO: No results found for: \"ABO\"    Past Cardiology Tests (Last 3 Years):    EKG:  Recent Labs     02/17/25  1033 04/03/23  1702 04/08/22  0903   ATRRATE 85 79 77   VENTRATE 85 79 77   PRINT 170 192 170   QRSDUR 76 86 76   QTCFRED 417 421 415   QTCCALCB 442 440 432     Encounter Date: 02/11/25   ECG 12 lead (Clinic Performed)   Result Value    Ventricular Rate 85    " Atrial Rate 85    WV Interval 170    QRS Duration 76    QT Interval 372    QTC Calculation(Bazett) 442    P Axis 47    R Axis -40    T Axis 104    QRS Count 14    Q Onset 230    P Onset 145    P Offset 202    T Offset 416    QTC Fredericia 417    Narrative    Normal sinus rhythm  Left axis deviation  Anterior infarct (cited on or before 29-JUN-2020)  Abnormal ECG  When compared with ECG of 03-APR-2023 17:02,  Fusion complexes are no longer Present  Confirmed by Ricthie Navarrete ([id number]) on 2/17/2025 11:01:45 AM     Echo:  Echocardiogram:   Echocardiogram     Narrative  [facility], [address] [place] [address]  Tel [phone] and Fax [phone]    TRANSTHORACIC ECHOCARDIOGRAM REPORT      Patient Name:     DENAE RUFF Reading Physician:   [id number] Bunny Burks DO  Study Date:       2/18/2021      Referring Physician: BI VERGARA  MRN/PID:          [id number]       PCP:                 Rosina Guardado MD  Accession/Order#: [id number]   Department Location: [facility]  YOB: 1955      Fellow:  Gender:           F              Nurse:  Admit Date:                      Sonographer:         Deja Chaves [facility]  Admission Status: Outpatient     Additional Staff:  Height:           167.64 cm      CC Report to:  Weight:           117.48 kg      Study Type:          Echocardiogram  BSA:              2.23 m2  Blood Pressure: 127 /86 mmHg    Diagnosis/ICD: I50.40-Unspecified combined systolic (congestive) and diastolic  (congestive) heart failure (CHF)  Indication:    Heart Failure  Procedure/CPT: Echo Complete w Full [id number]    Patient History:  Pacer/Defib:       AICD  Pertinent History: CAD and Hyperlipidemia.    Study Detail: The following Echo studies were performed: 2D, M-Mode, Doppler and  color flow. Technically challenging study due to body habitus.  Definity used as a contrast agent for endocardial border  definition and agitated saline used as a contrast agent  for  intraseptal flow evaluation. Total contrast used for this  procedure was 3.0 mL via IV push.      PHYSICIAN INTERPRETATION:  Left Ventricle: The left ventricular systolic function is mildly to moderately decreased, with an estimated ejection fraction of 40-45%. Wall motion is abnormal. The left ventricular cavity size is normal. Spectral Doppler shows an impaired relaxation pattern of left ventricular diastolic filling. There is an elevated mean left atrial pressure.  LV Wall Scoring:  The entire apex and mid and apical anterior wall are hypokinetic.    Left Atrium: The left atrium is normal in size.  Right Ventricle: The right ventricle is normal in size. There is normal right ventricular global systolic function.  Right Atrium: The right atrium is mildly dilated.  Aortic Valve: The aortic valve is trileaflet. There is no evidence of aortic valve regurgitation. The peak instantaneous gradient of the aortic valve is 9.5 mmHg.  Mitral Valve: The mitral valve is normal in structure. There is no evidence of mitral valve regurgitation.  Tricuspid Valve: The tricuspid valve is structurally normal. There is trace tricuspid regurgitation. The Doppler estimated RVSP is mildly elevated at 36.4 mmHg.  Pulmonic Valve: The pulmonic valve is structurally normal. There is no indication of pulmonic valve regurgitation.  Pericardium: There is no pericardial effusion noted.  Aorta: The aortic root is normal. There is no dilatation of the ascending aorta.  Systemic Veins: The inferior vena cava appears to be of normal size. There is IVC inspiratory collapse greater than 50%.  In comparison to the previous echocardiogram(s): Compared with study from 10/17/2017, LVEF has improved (previously 30-35%).      CONCLUSIONS:  1. The left ventricular systolic function is mildly to moderately decreased with a 40-45% estimated ejection fraction.  2. Entire apex and mid and apical anterior wall are abnormal.  3. Spectral Doppler shows an  impaired relaxation pattern of left ventricular diastolic filling.  4. There is an elevated mean left atrial pressure.  5. Mildly elevated RVSP.    QUANTITATIVE DATA SUMMARY:  2D MEASUREMENTS:  Normal Ranges:  LAs:           3.93 cm    (2.7-4.0cm)  IVSd:          1.34 cm    (0.6-1.1cm)  LVPWd:         1.26 cm    (0.6-1.1cm)  LVIDd:         5.09 cm    (3.9-5.9cm)  LVIDs:         3.84 cm  LV Mass Index: 120.7 g/m2  LV % FS        24.5 %    LA VOLUME:  Normal Ranges:  LA Vol A4C:        74.4 ml    (22+/-6mL/m2)  LA Vol A2C:        57.5 ml  LA Vol BP:         67.0 ml  LA Vol Index A4C:  33.3 ml/m2  LA Vol Index A2C:  25.7 ml/m2  LA Vol Index BP:   30.0 ml/m2  LA Area A4C:       23.1 cm2  LA Area A2C:       19.8 cm2  LA Major Axis A4C: 6.1 cm  LA Major Axis A2C: 5.8 cm  LA Volume Index:   29.7 ml/m2  LA Vol A4C:        70.0 ml  LA Vol A2C:        56.5 ml    M-MODE MEASUREMENTS:  Normal Ranges:  Ao Root: 3.20 cm (2.0-3.7cm)    AORTA MEASUREMENTS:  Normal Ranges:  Asc Ao, d: 3.10 cm (2.1-3.4cm)    LV SYSTOLIC FUNCTION BY 2D PLANIMETRY (MOD):  Normal Ranges:  EF-A4C View: 58.8 % (>55%)  EF-A2C View: 59.2 %  EF-Biplane:  60.3 %    LV DIASTOLIC FUNCTION:  Normal Ranges:  MV Peak E:        0.83 m/s    (0.7-1.2 m/s)  MV Peak A:        1.15 m/s    (0.42-0.7 m/s)  E/A Ratio:        0.72        (1.0-2.2)  MV e'             0.05 m/s    (>8.0)  MV lateral e'     0.06 m/s  MV medial e'      0.04 m/s  MV A Dur:         154.56 msec  E/e' Ratio:       16.55       (<8.0)  PulmV Sys Parth:    42.69 cm/s  PulmV Dean Parth:   28.30 cm/s  PulmV S/D Parth:    1.51  PulmV A Revs Parth: 34.08 cm/s  PulmV A Revs Dur: 96.89 msec    MITRAL VALVE:  Normal Ranges:  MV Vmax:    1.21 m/s (<1.3m/s)  MV peak P.9 mmHg (<5mmHg)  MV mean P.6 mmHg (<2mmHg)  MV VTI:     27.90 cm (10-13cm)  MV DT:      180 msec (150-240msec)    AORTIC VALVE:  Normal Ranges:  AoV Vmax:      1.54 m/s (<1.7m/s)  AoV Peak P.5 mmHg (<20mmHg)  LVOT Max Parth:  1.23 m/s  "(<1.1m/s)  LVOT VTI:      23.31 cm  LVOT Diameter: 2.22 cm  (1.8-2.4cm)  AoV Area,Vmax: 3.10 cm2 (2.5-4.5cm2)    RIGHT VENTRICLE:  RV 1   3.3 cm  RV 2   3.2 cm  RV 3   7.3 cm  TAPSE: 23.0 mm    TRICUSPID VALVE/RVSP:  Normal Ranges:  Peak TR Velocity: 2.89 m/s  Est. RA Pressure: 3 mmHg.  RV Syst Pressure: 36.4 mmHg (< 30mmHg)  IVC Diam:         1.70 cm    PULMONIC VALVE:  Normal Ranges:  PV Accel Time: 78 msec  (>120ms)  PV Max Parth:    1.1 m/s  (0.6-0.9m/s)  PV Max P.4 mmHg    Pulmonary Veins:  PulmV A Revs Dur: 96.89 msec  PulmV A Revs Parth: 34.08 cm/s  PulmV Dean Parth:   28.30 cm/s  PulmV S/D Parth:    1.51  PulmV Sys Parth:    42.69 cm/s    AORTA:  Asc Ao Diam 3.14 cm      [id number] Bunnyjenn Burks DO  Electronically signed on 2021 at 11:31:27 AM      Wall Scoring            Ejection Fractions:  No results found for: \"EF\"  Cath:  Coronary Angiography: No results found for this or any previous visit from the past 1800 days.    Right Heart Cath: No results found for this or any previous visit from the past 1800 days.    Stress Test:  Nuclear:No results found for this or any previous visit from the past 1800 days.    Metabolic Stress: No results found for this or any previous visit from the past 1800 days.    Cardiac Imaging:  Cardiac Scoring: No results found for this or any previous visit from the past 1800 days.    Cardiac MRI: No results found for this or any previous visit from the past 1800 days.         Assessment/Plan  Assessment/Plan   Assessment & Plan  Ischemic cardiomyopathy    ICD (implantable cardioverter-defibrillator) malfunction        -S-ICD generator change out with Dr. Navarrete on 25  -prophylaxis antibiotic prior to implant  -plan to dc home later today pending clinical course on prophylactic antibiotics         NP discussed with Dr. Navarrete regarding plan of care/ discharge plan      I spent 30 minutes in the professional and overall care of this patient.      Yadira Mack, RASHEL-CNP     "     [1]   Past Medical History:  Diagnosis Date    Abnormal finding of blood chemistry, unspecified 11/06/2019    Abnormal blood chemistry    Acute kidney failure, unspecified     Acute kidney injury    Acute posthemorrhagic anemia     Postoperative anemia due to acute blood loss    Adjustment disorder with depressed mood 11/13/2019    Grief reaction    Atherosclerotic heart disease of native coronary artery without angina pectoris 11/16/2017    Coronary artery disease    Bilateral primary osteoarthritis of knee     Osteoarthritis of both knees    Body mass index (BMI)40.0-44.9, adult 02/12/2021    Body mass index (BMI) of 40.0 to 44.9 in adult    Contusion of left wrist, initial encounter 11/21/2019    Contusion of left wrist, initial encounter    Dermatitis, unspecified 01/12/2018    Dermatitis of external ear    Encounter for follow-up examination after completed treatment for conditions other than malignant neoplasm 07/30/2021    Status post orthopedic surgery, follow-up exam    Encounter for other preprocedural examination 05/20/2021    Pre-operative clearance    Encounter for other screening for malignant neoplasm of breast 10/30/2020    Screening for breast cancer    Encounter for other screening for malignant neoplasm of breast 04/20/2017    Breast cancer screening    History of falling 11/20/2019    H/O fall    History of falling 11/13/2019    H/O fall    Hypomagnesemia     Hypomagnesemia    Myalgia, other site 10/26/2017    Myofascial pain    Nondisplaced transverse fracture of left patella, subsequent encounter for closed fracture with nonunion 10/28/2018    Closed nondisplaced transverse fracture of left patella with nonunion, subsequent encounter    Obesity, unspecified 10/31/2017    Obesity (BMI 30.0-34.9)    Other hemorrhoids     Internal hemorrhoid    Other injury of unspecified body region, initial encounter 01/08/2018    Hematoma    Other specified acquired deformities of right lower leg      Acquired genu recurvatum of right knee    Other specified symptoms and signs involving the circulatory and respiratory systems 02/12/2021    Chest congestion    Overweight     Overweight    Pain in unspecified hand 11/20/2019    Hand pain    Pain in unspecified hip 10/26/2017    Hip pain    Pain in unspecified knee 06/02/2016    Knee pain    Personal history of colonic polyps     History of colonic polyps    Personal history of diseases of the blood and blood-forming organs and certain disorders involving the immune mechanism     History of iron deficiency anemia    Personal history of other benign neoplasm 06/22/2016    History of other benign neoplasm    Personal history of other diseases of the circulatory system     History of cardiac disorder    Personal history of other diseases of the circulatory system 10/10/2016    History of hypertension    Personal history of other diseases of the digestive system 11/13/2019    History of acute pancreatitis    Personal history of other diseases of the musculoskeletal system and connective tissue 12/31/2013    History of neck pain    Personal history of other diseases of the musculoskeletal system and connective tissue 10/26/2017    History of low back pain    Personal history of other diseases of the musculoskeletal system and connective tissue 06/22/2016    History of temporomandibular joint disorder    Personal history of other diseases of the musculoskeletal system and connective tissue 09/20/2021    History of low back pain    Personal history of other endocrine, nutritional and metabolic disease 11/13/2019    History of hypokalemia    Personal history of other endocrine, nutritional and metabolic disease     History of dehydration    Personal history of other specified conditions 06/27/2019    History of abdominal pain    Personal history of peptic ulcer disease     History of peptic ulcer    Presence of left artificial knee joint 01/30/2020    History of left knee  replacement    Radiculopathy, lumbar region 2019    Lumbar radicular pain    Reaction to severe stress, unspecified 2019    Situational stress    Spondylolysis, cervical region     Cervical spondylolysis    Unspecified symptoms and signs involving the genitourinary system 2018    Lower urinary tract symptoms (LUTS)    Unspecified systolic (congestive) heart failure 2021    NYHA class 3 heart failure with reduced ejection fraction    Urge incontinence 2016    Sensory urge incontinence   [2]   Past Surgical History:  Procedure Laterality Date     SECTION, CLASSIC  2013     Section    CORONARY ANGIOPLASTY WITH STENT PLACEMENT  2017    Cath Stent Placement    CT ANGIO AORTA AND BILATERAL ILIOFEMORAL RUN OFF INCLUDING WITHOUT CONTRAST IF PERFORMED  2017    CT AORTA AND BILATERAL ILIOFEMORAL RUNOFF ANGIOGRAM W AND/OR WO IV CONTRAST 2017 [facility]    FOOT SURGERY  2013    Foot Surgery    GALLBLADDER SURGERY  2013    Gallbladder Surgery    HIP SURGERY  2013    Hip Surgery    KNEE SURGERY  2017    Knee Surgery    OTHER SURGICAL HISTORY  2020    Shoulder replacement   [3]   Social History  Tobacco Use    Smoking status: Never    Smokeless tobacco: Never   Substance Use Topics    Alcohol use: Never    Drug use: Yes     Types: Marijuana   [4]   Family History  Problem Relation Name Age of Onset    Coronary artery disease Sibling      Sudden death Sibling          sudden cardiac death (SCD)    Throat cancer Sibling     [5]   Allergies  Allergen Reactions    Lisinopril Swelling and Angioedema    Lisinopril-Hydrochlorothiazide Other    Tramadol Other     irritates pancreas   [6]   Scheduled medications   Medication Dose Route Frequency    ceFAZolin  2 g intravenous Once   [7]   PRN medications   Medication    dextrose    dextrose    glucagon    glucagon   [8]   Continuous Medications   Medication Dose Last Rate

## 2025-04-16 NOTE — NURSING NOTE
Home going instructions specific to procedure given. Easily arousable; responding appropriately. Vs +/- 20% of pre procedure status. Significant complications are absent. Ambulates without dizziness/age appropriate activity, pulse ox above or equal to 92% on room air/ordered oxygen treatment. Care Plan Complete. Discharge to home accompanied by (friend). Discharged via wheelchair.

## 2025-04-18 ENCOUNTER — HOSPITAL ENCOUNTER (OUTPATIENT)
Dept: CARDIOLOGY | Facility: CLINIC | Age: 70
Discharge: HOME | End: 2025-04-18
Payer: MEDICARE

## 2025-04-18 DIAGNOSIS — R93.1 DECREASED CARDIAC EJECTION FRACTION: ICD-10-CM

## 2025-04-18 DIAGNOSIS — I50.9 HEART FAILURE, UNSPECIFIED HF CHRONICITY, UNSPECIFIED HEART FAILURE TYPE: ICD-10-CM

## 2025-04-18 DIAGNOSIS — Z95.810 ICD (IMPLANTABLE CARDIOVERTER-DEFIBRILLATOR) IN PLACE: ICD-10-CM

## 2025-04-18 DIAGNOSIS — I47.29 OTHER VENTRICULAR TACHYCARDIA: ICD-10-CM

## 2025-04-18 DIAGNOSIS — Z95.810 PRESENCE OF AUTOMATIC (IMPLANTABLE) CARDIAC DEFIBRILLATOR: ICD-10-CM

## 2025-04-18 RX ORDER — CARVEDILOL 25 MG/1
25 TABLET ORAL 2 TIMES DAILY
Qty: 180 TABLET | Refills: 0 | Status: SHIPPED | OUTPATIENT
Start: 2025-04-18

## 2025-05-01 ENCOUNTER — CLINICAL SUPPORT (OUTPATIENT)
Dept: NUTRITION | Facility: CLINIC | Age: 70
End: 2025-05-01
Payer: MEDICARE

## 2025-05-01 NOTE — PROGRESS NOTES
[employer]  Diet Recommendation 1: Heart Healthy  Diet Recommendation 2: Sodium, Low  Diet Recommendation 3: Diabetes  Food Intolerance Avoidance: NKFA  Household Size: 2 Family Members  Interventions: Referral Number: 2nd 6 Mo Referral 1 yr (Referrals may not be consecutive)  Interventions: Visit Number: 3 of 6 Visits - Max 6 Visits/Referral Each 6 Mo Period  Education Today: Healthy Recipes  Recipes Today: verbal  Grains: 0-25% Whole  Fruit: % Fresh  Vegetables: Fresh - 100%  Proteins: 1-2 Plant-based Items  Dairy: 0-25% Lowfat  Relevant [facility] [employer] Inpatient Discharge Items: no dairy taken  Originating Site of Referral Order: Trina Floyd DO  Initials of RD Assisting Today: DON

## 2025-05-02 ENCOUNTER — TELEMEDICINE (OUTPATIENT)
Dept: PRIMARY CARE | Facility: CLINIC | Age: 70
End: 2025-05-02
Payer: MEDICARE

## 2025-05-02 DIAGNOSIS — R09.89 CHEST CONGESTION: ICD-10-CM

## 2025-05-02 PROCEDURE — 1036F TOBACCO NON-USER: CPT | Performed by: NURSE PRACTITIONER

## 2025-05-02 PROCEDURE — 1160F RVW MEDS BY RX/DR IN RCRD: CPT | Performed by: NURSE PRACTITIONER

## 2025-05-02 PROCEDURE — 1159F MED LIST DOCD IN RCRD: CPT | Performed by: NURSE PRACTITIONER

## 2025-05-02 PROCEDURE — 99213 OFFICE O/P EST LOW 20 MIN: CPT | Performed by: NURSE PRACTITIONER

## 2025-05-02 PROCEDURE — 1123F ACP DISCUSS/DSCN MKR DOCD: CPT | Performed by: NURSE PRACTITIONER

## 2025-05-02 RX ORDER — ALBUTEROL SULFATE 90 UG/1
2 INHALANT RESPIRATORY (INHALATION) EVERY 4 HOURS PRN
Qty: 8.5 G | Refills: 0 | Status: SHIPPED | OUTPATIENT
Start: 2025-05-02

## 2025-05-02 NOTE — PROGRESS NOTES
Subjective   Patient ID: Jerri Ramirez is a 69 y.o. female who presents for Med Refill.    Virtual or Telephone Consent    An interactive audio and video telecommunication system which permits real time communications between the patient (at the originating site) and provider (at the distant site) was utilized to provide this telehealth service.   Verbal consent was requested and obtained from Jerri Ramirez on this date, 05/02/25 for a telehealth visit and the patient's location was confirmed at the time of the visit.  Patient is located in [place]  DISCLAIMER:   In preparing for this visit and writing this note, I reviewed previous electronic medical records (labs, imaging and medical charts) of the patient available in the physician portal. Significant findings which helped in decision making are recorded in this encounter charting.  All allergies were reviewed with the patient and all medications reconciled with   the patient.    Ran out of albuterol hfa.  Needs refill of script - got script and was [facility] but will take 2 weeks to get in the mail.  She is totally out of medication.  She denies shortness of breath or wheezing currently  She needs script sent to local pharmacy while she is waiting for mail          Med Refill         Review of Systems   All other systems reviewed and are negative.      Objective   There were no vitals taken for this visit.    Physical Exam  Constitutional:       General: She is not in acute distress.     Appearance: Normal appearance.   HENT:      Head: Normocephalic and atraumatic.      Right Ear: External ear normal.      Left Ear: External ear normal.      Nose: Nose normal.      Mouth/Throat:      Mouth: Mucous membranes are moist.   Eyes:      Extraocular Movements: Extraocular movements intact.      Conjunctiva/sclera: Conjunctivae normal.      Pupils: Pupils are equal, round, and reactive to light.   Pulmonary:      Effort: Pulmonary effort is normal.    Neurological:      Mental Status: She is alert and oriented to person, place, and time.   Psychiatric:         Mood and Affect: Mood normal.         Behavior: Behavior normal.         Thought Content: Thought content normal.         Judgment: Judgment normal.         Assessment/Plan   Problem List Items Addressed This Visit           ICD-10-CM    Chest congestion R09.89    Following PCP and cardiology  Waiting for script in mail  Albuterol hfa sent to local pharmacy

## 2025-05-05 RX ORDER — ALBUTEROL SULFATE 90 UG/1
2 INHALANT RESPIRATORY (INHALATION)
Qty: 24 G | Refills: 0 | Status: SHIPPED | OUTPATIENT
Start: 2025-05-05

## 2025-05-12 ENCOUNTER — HOSPITAL ENCOUNTER (OUTPATIENT)
Dept: CARDIOLOGY | Facility: CLINIC | Age: 70
Discharge: HOME | End: 2025-05-12
Payer: MEDICARE

## 2025-05-12 DIAGNOSIS — I47.29 OTHER VENTRICULAR TACHYCARDIA: ICD-10-CM

## 2025-05-12 DIAGNOSIS — Z95.810 CARDIAC DEFIBRILLATOR IN PLACE: Primary | ICD-10-CM

## 2025-05-12 DIAGNOSIS — I47.20 VENTRICULAR TACHYCARDIA (MULTI): ICD-10-CM

## 2025-05-12 DIAGNOSIS — Z95.810 PRESENCE OF AUTOMATIC (IMPLANTABLE) CARDIAC DEFIBRILLATOR: ICD-10-CM

## 2025-05-15 PROCEDURE — RXMED WILLOW AMBULATORY MEDICATION CHARGE

## 2025-05-16 ENCOUNTER — HOSPITAL ENCOUNTER (OUTPATIENT)
Dept: CARDIOLOGY | Facility: CLINIC | Age: 70
Discharge: HOME | End: 2025-05-16
Payer: MEDICARE

## 2025-05-16 DIAGNOSIS — I47.20 PAROXYSMAL VT: ICD-10-CM

## 2025-05-16 DIAGNOSIS — Z95.810 AICD (AUTOMATIC CARDIOVERTER/DEFIBRILLATOR) PRESENT: ICD-10-CM

## 2025-05-16 PROCEDURE — 93261 INTERROGATE SUBQ DEFIB: CPT | Performed by: NURSE PRACTITIONER

## 2025-05-16 PROCEDURE — 93261 INTERROGATE SUBQ DEFIB: CPT

## 2025-05-17 ENCOUNTER — PHARMACY VISIT (OUTPATIENT)
Dept: PHARMACY | Facility: CLINIC | Age: 70
End: 2025-05-17
Payer: COMMERCIAL

## 2025-05-23 ENCOUNTER — APPOINTMENT (OUTPATIENT)
Dept: PRIMARY CARE | Facility: CLINIC | Age: 70
End: 2025-05-23
Payer: MEDICARE

## 2025-05-25 ENCOUNTER — APPOINTMENT (OUTPATIENT)
Dept: PRIMARY CARE | Facility: CLINIC | Age: 70
End: 2025-05-25
Payer: MEDICARE

## 2025-05-28 ENCOUNTER — OFFICE VISIT (OUTPATIENT)
Dept: URGENT CARE | Age: 70
End: 2025-05-28
Payer: MEDICARE

## 2025-05-28 VITALS
RESPIRATION RATE: 18 BRPM | HEART RATE: 80 BPM | TEMPERATURE: 97.7 F | OXYGEN SATURATION: 96 % | SYSTOLIC BLOOD PRESSURE: 146 MMHG | DIASTOLIC BLOOD PRESSURE: 75 MMHG

## 2025-05-28 DIAGNOSIS — E66.812 CLASS 2 SEVERE OBESITY WITH SERIOUS COMORBIDITY AND BODY MASS INDEX (BMI) OF 38.0 TO 38.9 IN ADULT, UNSPECIFIED OBESITY TYPE: ICD-10-CM

## 2025-05-28 DIAGNOSIS — S46.812A STRAIN OF LEFT TRAPEZIUS MUSCLE, INITIAL ENCOUNTER: Primary | ICD-10-CM

## 2025-05-28 DIAGNOSIS — M25.511 ACUTE PAIN OF RIGHT SHOULDER: ICD-10-CM

## 2025-05-28 DIAGNOSIS — S46.812A TRAPEZIUS MUSCLE STRAIN, LEFT, INITIAL ENCOUNTER: ICD-10-CM

## 2025-05-28 DIAGNOSIS — E66.813 CLASS 3 SEVERE OBESITY WITH SERIOUS COMORBIDITY AND BODY MASS INDEX (BMI) OF 40.0 TO 44.9 IN ADULT, UNSPECIFIED OBESITY TYPE: ICD-10-CM

## 2025-05-28 DIAGNOSIS — J01.00 ACUTE NON-RECURRENT MAXILLARY SINUSITIS: ICD-10-CM

## 2025-05-28 DIAGNOSIS — E66.01 CLASS 2 SEVERE OBESITY WITH SERIOUS COMORBIDITY AND BODY MASS INDEX (BMI) OF 38.0 TO 38.9 IN ADULT, UNSPECIFIED OBESITY TYPE: ICD-10-CM

## 2025-05-28 RX ORDER — KETOROLAC TROMETHAMINE 30 MG/ML
30 INJECTION, SOLUTION INTRAMUSCULAR; INTRAVENOUS ONCE
Status: COMPLETED | OUTPATIENT
Start: 2025-05-28 | End: 2025-05-28

## 2025-05-28 RX ORDER — CYCLOBENZAPRINE HCL 10 MG
10 TABLET ORAL NIGHTLY PRN
Qty: 10 TABLET | Refills: 0 | Status: SHIPPED | OUTPATIENT
Start: 2025-05-28 | End: 2025-06-07

## 2025-05-28 RX ORDER — AMOXICILLIN AND CLAVULANATE POTASSIUM 875; 125 MG/1; MG/1
875 TABLET, FILM COATED ORAL 2 TIMES DAILY
Qty: 20 TABLET | Refills: 0 | Status: SHIPPED | OUTPATIENT
Start: 2025-05-28

## 2025-05-28 RX ADMIN — KETOROLAC TROMETHAMINE 30 MG: 30 INJECTION, SOLUTION INTRAMUSCULAR; INTRAVENOUS at 15:20

## 2025-05-28 ASSESSMENT — PAIN SCALES - GENERAL: PAINLEVEL_OUTOF10: 7

## 2025-05-28 NOTE — PROGRESS NOTES
Subjective   Patient ID: Jerri Ramirez is a 69 y.o. female. They present today with a chief complaint of Neck Pain (Head pain in the back and LT side of neck/face over than a week).    History of Present Illness  HPI    Past Medical History  Allergies as of 05/28/2025 - Reviewed 05/28/2025   Allergen Reaction Noted    Lisinopril Swelling and Angioedema 03/20/2023    Lisinopril-hydrochlorothiazide Other 08/02/2016    Tramadol Other 08/02/2016       Prescriptions Prior to Admission[1]     Medical History[2]    Surgical History[3]     reports that she has never smoked. She has never used smokeless tobacco. She reports current drug use. Drug: Marijuana. She reports that she does not drink alcohol.    Review of Systems  Review of Systems                               Objective    Vitals:    05/28/25 1511   BP: 146/75   BP Location: Left arm   Patient Position: Sitting   Pulse: 80   Resp: 18   Temp: 36.5 °C (97.7 °F)   TempSrc: Oral   SpO2: 96%     No LMP recorded. Patient is postmenopausal.    Physical Exam    Procedures    Point of Care Test & Imaging Results from this visit  No results found for this visit on 05/28/25.   Imaging  No results found.    Cardiology, Vascular, and Other Imaging  No other imaging results found for the past 2 days      Diagnostic study results (if any) were reviewed by BETH Rodrigues.    Assessment/Plan   Allergies, medications, history, and pertinent labs/EKGs/Imaging reviewed by BETH Rodrigues.     Medical Decision Making  ***    Orders and Diagnoses  Diagnoses and all orders for this visit:  Strain of left trapezius muscle, initial encounter  -     ketorolac (Toradol) injection 30 mg  Acute pain of right shoulder  -     cyclobenzaprine (Flexeril) 10 mg tablet; Take 1 tablet (10 mg) by mouth as needed at bedtime for muscle spasms for up to 10 days.  Acute non-recurrent maxillary sinusitis  -     amoxicillin-clavulanate (Augmentin) 875-125 mg tablet; Take 1 tablet  (875 mg) by mouth 2 times a day.      Return to Urgent care if symptoms return or progress  Follow up with PCP in 1-2 weeks     Patient disposition: Home    Electronically signed by BETH Rodrigues  3:43 PM           [1] (Not in a hospital admission)  [2]   Past Medical History:  Diagnosis Date    Abnormal finding of blood chemistry, unspecified 11/06/2019    Abnormal blood chemistry    Acute kidney failure, unspecified     Acute kidney injury    Acute posthemorrhagic anemia     Postoperative anemia due to acute blood loss    Adjustment disorder with depressed mood 11/13/2019    Grief reaction    Atherosclerotic heart disease of native coronary artery without angina pectoris 11/16/2017    Coronary artery disease    Bilateral primary osteoarthritis of knee     Osteoarthritis of both knees    Body mass index (BMI)40.0-44.9, adult 02/12/2021    Body mass index (BMI) of 40.0 to 44.9 in adult    Contusion of left wrist, initial encounter 11/21/2019    Contusion of left wrist, initial encounter    Dermatitis, unspecified 01/12/2018    Dermatitis of external ear    Encounter for follow-up examination after completed treatment for conditions other than malignant neoplasm 07/30/2021    Status post orthopedic surgery, follow-up exam    Encounter for other preprocedural examination 05/20/2021    Pre-operative clearance    Encounter for other screening for malignant neoplasm of breast 10/30/2020    Screening for breast cancer    Encounter for other screening for malignant neoplasm of breast 04/20/2017    Breast cancer screening    History of falling 11/20/2019    H/O fall    History of falling 11/13/2019    H/O fall    Hypomagnesemia     Hypomagnesemia    ICD (implantable cardioverter-defibrillator) malfunction 04/16/2025    Myalgia, other site 10/26/2017    Myofascial pain    Nondisplaced transverse fracture of left patella, subsequent encounter for closed fracture with nonunion 10/28/2018    Closed nondisplaced  transverse fracture of left patella with nonunion, subsequent encounter    Obesity, unspecified 10/31/2017    Obesity (BMI 30.0-34.9)    Other hemorrhoids     Internal hemorrhoid    Other injury of unspecified body region, initial encounter 01/08/2018    Hematoma    Other specified acquired deformities of right lower leg     Acquired genu recurvatum of right knee    Other specified symptoms and signs involving the circulatory and respiratory systems 02/12/2021    Chest congestion    Overweight     Overweight    Pain in unspecified hand 11/20/2019    Hand pain    Pain in unspecified hip 10/26/2017    Hip pain    Pain in unspecified knee 06/02/2016    Knee pain    Personal history of colonic polyps     History of colonic polyps    Personal history of diseases of the blood and blood-forming organs and certain disorders involving the immune mechanism     History of iron deficiency anemia    Personal history of other benign neoplasm 06/22/2016    History of other benign neoplasm    Personal history of other diseases of the circulatory system     History of cardiac disorder    Personal history of other diseases of the circulatory system 10/10/2016    History of hypertension    Personal history of other diseases of the digestive system 11/13/2019    History of acute pancreatitis    Personal history of other diseases of the musculoskeletal system and connective tissue 12/31/2013    History of neck pain    Personal history of other diseases of the musculoskeletal system and connective tissue 10/26/2017    History of low back pain    Personal history of other diseases of the musculoskeletal system and connective tissue 06/22/2016    History of temporomandibular joint disorder    Personal history of other diseases of the musculoskeletal system and connective tissue 09/20/2021    History of low back pain    Personal history of other endocrine, nutritional and metabolic disease 11/13/2019    History of hypokalemia    Personal  history of other endocrine, nutritional and metabolic disease     History of dehydration    Personal history of other specified conditions 2019    History of abdominal pain    Personal history of peptic ulcer disease     History of peptic ulcer    Presence of left artificial knee joint 2020    History of left knee replacement    Radiculopathy, lumbar region 2019    Lumbar radicular pain    Reaction to severe stress, unspecified 2019    Situational stress    Spondylolysis, cervical region     Cervical spondylolysis    Unspecified symptoms and signs involving the genitourinary system 2018    Lower urinary tract symptoms (LUTS)    Unspecified systolic (congestive) heart failure 2021    NYHA class 3 heart failure with reduced ejection fraction    Urge incontinence 2016    Sensory urge incontinence   [3]   Past Surgical History:  Procedure Laterality Date    CARDIAC ELECTROPHYSIOLOGY PROCEDURE Left 2025    Procedure: ICD SUBQ Generator Change Out;  Surgeon: Ritchie Navarrete MD;  Location: [facility];  Service: Electrophysiology;  Laterality: Left;  [employer]     SECTION, CLASSIC  2013     Section    CORONARY ANGIOPLASTY WITH STENT PLACEMENT  2017    Cath Stent Placement    CT ANGIO AORTA AND BILATERAL ILIOFEMORAL RUN OFF INCLUDING WITHOUT CONTRAST IF PERFORMED  2017    CT AORTA AND BILATERAL ILIOFEMORAL RUNOFF ANGIOGRAM W AND/OR WO IV CONTRAST 2017 [facility]    FOOT SURGERY  2013    Foot Surgery    GALLBLADDER SURGERY  2013    Gallbladder Surgery    HIP SURGERY  2013    Hip Surgery    KNEE SURGERY  2017    Knee Surgery    OTHER SURGICAL HISTORY  2020    Shoulder replacement      history of other diseases of the musculoskeletal system and connective tissue 2013    History of neck pain    Personal history of other diseases of the musculoskeletal system and connective tissue 10/26/2017    History of low back pain    Personal history of other diseases of the musculoskeletal system and connective tissue 2016    History of temporomandibular joint disorder    Personal history of other diseases of the musculoskeletal system and connective tissue 2021    History of low back pain    Personal history of other endocrine, nutritional and metabolic disease 2019    History of hypokalemia    Personal history of other endocrine, nutritional and metabolic disease     History of dehydration    Personal history of other specified conditions 2019    History of abdominal pain    Personal history of peptic ulcer disease     History of peptic ulcer    Presence of left artificial knee joint 2020    History of left knee replacement    Radiculopathy, lumbar region 2019    Lumbar radicular pain    Reaction to severe stress, unspecified 2019    Situational stress    Spondylolysis, cervical region     Cervical spondylolysis    Unspecified symptoms and signs involving the genitourinary system 2018    Lower urinary tract symptoms (LUTS)    Unspecified systolic (congestive) heart failure 2021    NYHA class 3 heart failure with reduced ejection fraction    Urge incontinence 2016    Sensory urge incontinence   [3]   Past Surgical History:  Procedure Laterality Date    CARDIAC ELECTROPHYSIOLOGY PROCEDURE Left 2025    Procedure: ICD SUBQ Generator Change Out;  Surgeon: Ritchie Navarrete MD;  Location: [facility] Cardiac Cath Lab;  Service: Electrophysiology;  Laterality: Left;  [employer]     SECTION, CLASSIC  2013     Section    CORONARY ANGIOPLASTY WITH STENT PLACEMENT  2017    Cath Stent Placement    CT ANGIO AORTA AND BILATERAL  ILIOFEMORAL RUN OFF INCLUDING WITHOUT CONTRAST IF PERFORMED  2/20/2017    CT AORTA AND BILATERAL ILIOFEMORAL RUNOFF ANGIOGRAM W AND/OR WO IV CONTRAST 2/20/2017 [facility]    FOOT SURGERY  12/31/2013    Foot Surgery    GALLBLADDER SURGERY  12/31/2013    Gallbladder Surgery    HIP SURGERY  12/31/2013    Hip Surgery    KNEE SURGERY  11/16/2017    Knee Surgery    OTHER SURGICAL HISTORY  09/26/2020    Shoulder replacement

## 2025-06-02 ENCOUNTER — CLINICAL SUPPORT (OUTPATIENT)
Dept: NUTRITION | Facility: CLINIC | Age: 70
End: 2025-06-02
Payer: MEDICARE

## 2025-06-02 NOTE — PROGRESS NOTES
[employer]  Diet Recommendation 1: Heart Healthy  Diet Recommendation 2: Sodium, Low  Diet Recommendation 3: Diabetes  Food Intolerance Avoidance: NKFA  Household Size: 2 Family Members  Interventions: Referral Number: 2nd 6 Mo Referral 1 yr (Referrals may not be consecutive)  Interventions: Visit Number: 4 of 6 Visits - Max 6 Visits/Referral Each 6 Mo Period  Education Today: Healthy Recipes  Recipes Today: -  Grains: 25-50% Whole  Fruit: 50-75% Fresh  Vegetables: Fresh - 100%  Proteins: 0 Plant-based Items  Dairy: 0-25% Lowfat  Relevant [employer] Inpatient Discharge Items: no dairy taken  Originating Site of Referral Order: Trina Floyd DO  Initials of RD Assisting Today: DON

## 2025-06-04 PROBLEM — E66.813 CLASS 3 SEVERE OBESITY WITH SERIOUS COMORBIDITY AND BODY MASS INDEX (BMI) OF 40.0 TO 44.9 IN ADULT, UNSPECIFIED OBESITY TYPE: Status: ACTIVE | Noted: 2025-06-04

## 2025-06-04 ASSESSMENT — ENCOUNTER SYMPTOMS
RESPIRATORY NEGATIVE: 1
EYES NEGATIVE: 1
SINUS PRESSURE: 1
ENDOCRINE NEGATIVE: 1
PSYCHIATRIC NEGATIVE: 1
GASTROINTESTINAL NEGATIVE: 1
HEMATOLOGIC/LYMPHATIC NEGATIVE: 1
NECK PAIN: 1
RHINORRHEA: 1
ALLERGIC/IMMUNOLOGIC NEGATIVE: 1
SINUS PAIN: 1
FATIGUE: 1
CARDIOVASCULAR NEGATIVE: 1
NEUROLOGICAL NEGATIVE: 1

## 2025-06-10 DIAGNOSIS — N39.41 URGE INCONTINENCE OF URINE: ICD-10-CM

## 2025-06-17 ENCOUNTER — HOSPITAL ENCOUNTER (OUTPATIENT)
Dept: CARDIOLOGY | Facility: CLINIC | Age: 70
Discharge: HOME | End: 2025-06-17
Payer: MEDICARE

## 2025-06-17 DIAGNOSIS — I47.29 OTHER VENTRICULAR TACHYCARDIA: ICD-10-CM

## 2025-06-17 DIAGNOSIS — Z95.810 PRESENCE OF AUTOMATIC (IMPLANTABLE) CARDIAC DEFIBRILLATOR: ICD-10-CM

## 2025-06-17 PROCEDURE — 93296 REM INTERROG EVL PM/IDS: CPT

## 2025-06-18 DIAGNOSIS — N39.41 URGE INCONTINENCE OF URINE: ICD-10-CM

## 2025-06-18 RX ORDER — TROSPIUM CHLORIDE ER 60 MG/1
60 CAPSULE ORAL DAILY
Qty: 90 CAPSULE | Refills: 3 | Status: SHIPPED | OUTPATIENT
Start: 2025-06-18 | End: 2025-06-18 | Stop reason: SDUPTHER

## 2025-06-18 RX ORDER — TROSPIUM CHLORIDE ER 60 MG/1
60 CAPSULE ORAL DAILY
Qty: 5 CAPSULE | Refills: 0 | Status: SHIPPED | OUTPATIENT
Start: 2025-06-18 | End: 2025-06-23

## 2025-06-20 PROCEDURE — RXMED WILLOW AMBULATORY MEDICATION CHARGE

## 2025-06-21 ENCOUNTER — PHARMACY VISIT (OUTPATIENT)
Dept: PHARMACY | Facility: CLINIC | Age: 70
End: 2025-06-21
Payer: COMMERCIAL

## 2025-06-26 ENCOUNTER — OFFICE VISIT (OUTPATIENT)
Dept: PAIN MEDICINE | Facility: HOSPITAL | Age: 70
End: 2025-06-26
Payer: MEDICARE

## 2025-06-26 DIAGNOSIS — M54.16 LUMBAR RADICULOPATHY: ICD-10-CM

## 2025-06-26 PROCEDURE — 1036F TOBACCO NON-USER: CPT | Performed by: ANESTHESIOLOGY

## 2025-06-26 PROCEDURE — 99214 OFFICE O/P EST MOD 30 MIN: CPT | Performed by: ANESTHESIOLOGY

## 2025-06-26 NOTE — PROGRESS NOTES
Subjective   Patient ID: Jerri Ramirez is a 69 y.o. female presenting with complaint of bilateral lower back.    HPI:   Jerri Ramirez is a 69 y.o. female presenting with complaint of bilateral lower back.  Patient describes her pain as radiating down bilateral lower extremities.  Patient is status post bilateral L2 lumbar transforaminal epidural steroid injection on 8/5/2024 for similar pain issue, and says that that provided her with excellent relief, over 90% relief and it lasted 6 months or more.  Patient says that her pain was exacerbated again about 2 weeks ago, when she feels as though she may have pulled a muscle while overexerting herself. Patient describes his pain as 9/10 intensity and achy in nature.       Review of Systems   13-point ROS done and negative except for HPI.     Current Outpatient Medications   Medication Instructions    albuterol 90 mcg/actuation inhaler 2 puffs, inhalation, 4 times daily RT    albuterol 90 mcg/actuation inhaler 2 puffs, inhalation, Every 4 hours PRN    amLODIPine (NORVASC) 10 mg, oral, Daily    amoxicillin-clavulanate (Augmentin) 875-125 mg tablet 875 mg, oral, 2 times daily    aspirin 81 mg EC tablet 1 tablet, Daily    atorvastatin (Lipitor) 80 mg tablet TAKE 1 TABLET AT BEDTIME    carvedilol (COREG) 25 mg, oral, 2 times daily    cyclobenzaprine (FLEXERIL) 10 mg, oral, Nightly PRN    diclofenac sodium (Voltaren) 1 % gel USE AS INSTRUCTED BY YOUR PRESCRIBER    docusate sodium 250 mg, oral, Daily    Farxiga 10 mg, oral, Daily before breakfast    fluticasone (Flonase) 50 mcg/actuation nasal spray 2 sprays, Each Nostril, Daily    furosemide (LASIX) 20 mg, oral, Daily, As directed    gabapentin (Neurontin) 600 mg tablet TAKE 2 TABLETS(1200 MG) BY MOUTH THREE TIMES DAILY    MagOx 400 mg, oral, Daily    omeprazole (PRILOSEC) 20 mg, oral, Daily before breakfast       Medical History[1]     Surgical History[2]     Family History[3]     RX Allergies[4]     Objective     There  were no vitals filed for this visit.     Physical Exam  General: NAD, well groomed, well nourished  Eyes: Non-icteric sclera, EOMI  Ears, Nose, Mouth, and Throat: External ears and nose appear to be without deformity or rash. No lesions or masses noted. Hearing is grossly intact.   Neck: Trachea midline  Respiratory: Nonlabored breathing   Cardiovascular: Peripheral edema noted in bilateral lower extremities   Skin: No rashes or open lesions/ulcers identified on skin.    Psychiatric: Alert, orientation to person, place, and time. Cooperative.    Imaging personally reviewed and independently interpreted  Narrative & Impression   MRN: [id number]  Patient Name: DENAE RAMIREZ     STUDY:  SPINE, LUMBOSACRAL  MIN 4 VIEWS     INDICATION:  Lumbar spinal stenosis  M48.061: Lumbar spinal stenosis.     COMPARISON:  April 23, 2019     ACCESSION NUMBER(S):  [id number]     ORDERING CLINICIAN:  MOON QUINONEZ     FINDINGS:  Lumbar fusion changes with laminectomy. There is L3-S1 fusion  hardware with the vertebroplasty anteriorly at L3.     Advanced degenerative changes noted.     Spinal stimulator seen. No pathologic motion.     IMPRESSION:  Postsurgical and advanced degenerative changes of the lumbar spine as  above, grossly similar to previous exam.       Assessment/Plan   Denae Ramirez is a 69 y.o. female presenting with complaint of bilateral lower back.  Patient describes her pain as radiating down bilateral lower extremities.     Plan:  -Patient to be scheduled for repeat bilateral L2 lumbar transforaminal epidural steroid injection under fluoroscopy.     Patient is no longer on blood thinners.    Medical Necessity  The patient has failed conservative treatment including different classes of medications and physical therapy.  Patient continues to rate their pain as moderate to severe, affecting quality of sleep, quality of life and  significantly impairing daily activities (ADLs)   We discussed  the risks, benefits and  alternatives of the procedure including but not limited to: Lack of efficacy , Transiently worsening pain , Bleeding, Infection , and Nerve Damage and patient is amicable to the plan      Follow up: As needed     The patient was invited to contact us back anytime with any questions or concerns and follow-up with us in the office as needed.     Diagnoses and all orders for this visit:  Lumbar radiculopathy  -     FL pain management; Future  -     Transforaminal; Future  Other orders  -     NPO Diet Except: Sips with meds; Effective now; Standing  -     Height and weight; Standing  -     Insert and maintain peripheral IV; Standing  -     Saline lock IV; Standing  -     POCT Glucose; Standing  -     Type And Screen; Standing  -     Adult diet Regular; Standing  -     Vital Signs; Standing  -     Neuro checks; Standing  -     Notify physician - Standard Parameters; Standing  -     Continue IV fluids ordered pre-procedure; Standing  -     Prior to Discharge O2 Weaning; Standing  -     Pulse oximetry, continuous; Standing      This note was generated with the aid of dictation software, there may be typos despite my attempts at proofreading.           [1]   Past Medical History:  Diagnosis Date    Abnormal finding of blood chemistry, unspecified 11/06/2019    Abnormal blood chemistry    Acute kidney failure, unspecified     Acute kidney injury    Acute posthemorrhagic anemia     Postoperative anemia due to acute blood loss    Adjustment disorder with depressed mood 11/13/2019    Grief reaction    Atherosclerotic heart disease of native coronary artery without angina pectoris 11/16/2017    Coronary artery disease    Bilateral primary osteoarthritis of knee     Osteoarthritis of both knees    Body mass index (BMI)40.0-44.9, adult 02/12/2021    Body mass index (BMI) of 40.0 to 44.9 in adult    Contusion of left wrist, initial encounter 11/21/2019    Contusion of left wrist, initial encounter    Dermatitis, unspecified  01/12/2018    Dermatitis of external ear    Encounter for follow-up examination after completed treatment for conditions other than malignant neoplasm 07/30/2021    Status post orthopedic surgery, follow-up exam    Encounter for other preprocedural examination 05/20/2021    Pre-operative clearance    Encounter for other screening for malignant neoplasm of breast 10/30/2020    Screening for breast cancer    Encounter for other screening for malignant neoplasm of breast 04/20/2017    Breast cancer screening    History of falling 11/20/2019    H/O fall    History of falling 11/13/2019    H/O fall    Hypomagnesemia     Hypomagnesemia    ICD (implantable cardioverter-defibrillator) malfunction 04/16/2025    Myalgia, other site 10/26/2017    Myofascial pain    Nondisplaced transverse fracture of left patella, subsequent encounter for closed fracture with nonunion 10/28/2018    Closed nondisplaced transverse fracture of left patella with nonunion, subsequent encounter    Obesity, unspecified 10/31/2017    Obesity (BMI 30.0-34.9)    Other hemorrhoids     Internal hemorrhoid    Other injury of unspecified body region, initial encounter 01/08/2018    Hematoma    Other specified acquired deformities of right lower leg     Acquired genu recurvatum of right knee    Other specified symptoms and signs involving the circulatory and respiratory systems 02/12/2021    Chest congestion    Overweight     Overweight    Pain in unspecified hand 11/20/2019    Hand pain    Pain in unspecified hip 10/26/2017    Hip pain    Pain in unspecified knee 06/02/2016    Knee pain    Personal history of colonic polyps     History of colonic polyps    Personal history of diseases of the blood and blood-forming organs and certain disorders involving the immune mechanism     History of iron deficiency anemia    Personal history of other benign neoplasm 06/22/2016    History of other benign neoplasm    Personal history of other diseases of the circulatory  system     History of cardiac disorder    Personal history of other diseases of the circulatory system 10/10/2016    History of hypertension    Personal history of other diseases of the digestive system 11/13/2019    History of acute pancreatitis    Personal history of other diseases of the musculoskeletal system and connective tissue 12/31/2013    History of neck pain    Personal history of other diseases of the musculoskeletal system and connective tissue 10/26/2017    History of low back pain    Personal history of other diseases of the musculoskeletal system and connective tissue 06/22/2016    History of temporomandibular joint disorder    Personal history of other diseases of the musculoskeletal system and connective tissue 09/20/2021    History of low back pain    Personal history of other endocrine, nutritional and metabolic disease 11/13/2019    History of hypokalemia    Personal history of other endocrine, nutritional and metabolic disease     History of dehydration    Personal history of other specified conditions 06/27/2019    History of abdominal pain    Personal history of peptic ulcer disease     History of peptic ulcer    Presence of left artificial knee joint 01/30/2020    History of left knee replacement    Radiculopathy, lumbar region 04/24/2019    Lumbar radicular pain    Reaction to severe stress, unspecified 06/30/2019    Situational stress    Spondylolysis, cervical region     Cervical spondylolysis    Unspecified symptoms and signs involving the genitourinary system 09/07/2018    Lower urinary tract symptoms (LUTS)    Unspecified systolic (congestive) heart failure 03/03/2021    NYHA class 3 heart failure with reduced ejection fraction    Urge incontinence 06/24/2016    Sensory urge incontinence   [2]   Past Surgical History:  Procedure Laterality Date    CARDIAC ELECTROPHYSIOLOGY PROCEDURE Left 4/16/2025    Procedure: ICD SUBQ Generator Change Out;  Surgeon: Ritchie Navarrete MD;  Location: [facility]  Cardiac Cath Lab;  Service: Electrophysiology;  Laterality: Left;  [employer]     SECTION, CLASSIC  2013     Section    CORONARY ANGIOPLASTY WITH STENT PLACEMENT  2017    Cath Stent Placement    CT ANGIO AORTA AND BILATERAL ILIOFEMORAL RUN OFF INCLUDING WITHOUT CONTRAST IF PERFORMED  2017    CT AORTA AND BILATERAL ILIOFEMORAL RUNOFF ANGIOGRAM W AND/OR WO IV CONTRAST 2017 [facility]    FOOT SURGERY  2013    Foot Surgery    GALLBLADDER SURGERY  2013    Gallbladder Surgery    HIP SURGERY  2013    Hip Surgery    KNEE SURGERY  2017    Knee Surgery    OTHER SURGICAL HISTORY  2020    Shoulder replacement   [3]   Family History  Problem Relation Name Age of Onset    Coronary artery disease Sibling      Sudden death Sibling          sudden cardiac death (SCD)    Throat cancer Sibling     [4]   Allergies  Allergen Reactions    Lisinopril Swelling and Angioedema    Lisinopril-Hydrochlorothiazide Other    Tramadol Other     irritates pancreas      intact

## 2025-06-27 ENCOUNTER — TELEPHONE (OUTPATIENT)
Dept: PRIMARY CARE | Facility: CLINIC | Age: 70
End: 2025-06-27
Payer: MEDICARE

## 2025-06-27 NOTE — TELEPHONE ENCOUNTER
[facility] called stating [facility] need [facility] to call regarding a medication you prescribe trospium 60 mg 24 hour. Patient stated the pharmacy has questions about medication will not fill it

## 2025-06-30 ENCOUNTER — HOSPITAL ENCOUNTER (OUTPATIENT)
Facility: HOSPITAL | Age: 70
Discharge: HOME | End: 2025-06-30
Payer: MEDICARE

## 2025-06-30 VITALS
WEIGHT: 247 LBS | RESPIRATION RATE: 17 BRPM | BODY MASS INDEX: 38.77 KG/M2 | SYSTOLIC BLOOD PRESSURE: 147 MMHG | OXYGEN SATURATION: 98 % | HEART RATE: 86 BPM | TEMPERATURE: 98.4 F | HEIGHT: 67 IN | DIASTOLIC BLOOD PRESSURE: 82 MMHG

## 2025-06-30 DIAGNOSIS — M54.16 LUMBAR RADICULOPATHY: ICD-10-CM

## 2025-06-30 PROCEDURE — 2550000001 HC RX 255 CONTRASTS: Performed by: ANESTHESIOLOGY

## 2025-06-30 PROCEDURE — 64483 NJX AA&/STRD TFRM EPI L/S 1: CPT | Performed by: ANESTHESIOLOGY

## 2025-06-30 PROCEDURE — 2500000004 HC RX 250 GENERAL PHARMACY W/ HCPCS (ALT 636 FOR OP/ED): Performed by: ANESTHESIOLOGY

## 2025-06-30 PROCEDURE — 64483 NJX AA&/STRD TFRM EPI L/S 1: CPT | Mod: 50 | Performed by: ANESTHESIOLOGY

## 2025-06-30 PROCEDURE — 2720000007 HC OR 272 NO HCPCS

## 2025-06-30 RX ORDER — MIDAZOLAM HYDROCHLORIDE 1 MG/ML
INJECTION, SOLUTION INTRAMUSCULAR; INTRAVENOUS
Status: COMPLETED | OUTPATIENT
Start: 2025-06-30 | End: 2025-06-30

## 2025-06-30 RX ORDER — DEXAMETHASONE SODIUM PHOSPHATE 10 MG/ML
INJECTION INTRAMUSCULAR; INTRAVENOUS
Status: COMPLETED | OUTPATIENT
Start: 2025-06-30 | End: 2025-06-30

## 2025-06-30 RX ORDER — LIDOCAINE HYDROCHLORIDE 5 MG/ML
INJECTION, SOLUTION INFILTRATION; INTRAVENOUS
Status: COMPLETED | OUTPATIENT
Start: 2025-06-30 | End: 2025-06-30

## 2025-06-30 RX ADMIN — IOHEXOL 2 ML: 240 INJECTION, SOLUTION INTRATHECAL; INTRAVASCULAR; INTRAVENOUS; ORAL at 08:52

## 2025-06-30 RX ADMIN — LIDOCAINE HYDROCHLORIDE 8 ML: 5 INJECTION, SOLUTION INFILTRATION at 08:51

## 2025-06-30 RX ADMIN — MIDAZOLAM 2 MG: 1 INJECTION INTRAMUSCULAR; INTRAVENOUS at 08:43

## 2025-06-30 RX ADMIN — DEXAMETHASONE SODIUM PHOSPHATE 10 MG: 10 INJECTION, SOLUTION INTRAMUSCULAR; INTRAVENOUS at 08:51

## 2025-06-30 ASSESSMENT — PAIN - FUNCTIONAL ASSESSMENT
PAIN_FUNCTIONAL_ASSESSMENT: WONG-BAKER FACES
PAIN_FUNCTIONAL_ASSESSMENT: WONG-BAKER FACES
PAIN_FUNCTIONAL_ASSESSMENT: 0-10

## 2025-06-30 ASSESSMENT — PAIN SCALES - GENERAL
PAINLEVEL_OUTOF10: 0 - NO PAIN
PAINLEVEL_OUTOF10: 0 - NO PAIN
PAINLEVEL_OUTOF10: 8
PAINLEVEL_OUTOF10: 5 - MODERATE PAIN
PAINLEVEL_OUTOF10: 0 - NO PAIN

## 2025-06-30 NOTE — H&P
HISTORY AND PHYSICAL UPDATE FOR PROCEDURE    History Of Present Illness  Jerri Ramirez is a 69 y.o. female presenting with back pain.  Here for Bilateral lumbar transforaminal epidural steroid injections, likely L2    This note is intended to be an update to the H&P from the last office consult note. Please refer to the last pain management consult note for full H&P.    she denies any recent antibiotic use or infections, she denies any blood thinner use , and she denies contrast or local anesthetic allergies     Pre-sedation evaluation:  ASA Classification: ASA II    Mallampati score: Class II    Past Medical History  Medical History[1]    Surgical History  Surgical History[2]     Social History  She reports that she quit smoking about 25 years ago. Her smoking use included cigarettes. She started smoking about 54 years ago. She has a 7.3 pack-year smoking history. She has never used smokeless tobacco. She reports that she does not currently use alcohol. She reports current drug use. Frequency: 5.00 times per week. Drug: Marijuana.    Family History  Family History[3]     Allergies  Lisinopril, Lisinopril-hydrochlorothiazide, and Tramadol    Review of Systems   12 point ROS done and negative except for the above.   Physical Exam     General: NAD, well groomed, well nourished  Eyes: Non-icteric sclera, EOMI  Ears, Nose, Mouth, and Throat: External ears and nose appear to be without deformity or rash. No lesions or masses noted. Hearing is grossly intact.   Neck: Trachea midline  Respiratory: Nonlabored breathing   Cardiovascular: No peripheral edema   Skin: No rashes or open lesions/ulcers identified on skin.    Last Recorded Vitals  There were no vitals taken for this visit.    Relevant Results  Current Outpatient Medications   Medication Instructions    albuterol 90 mcg/actuation inhaler 2 puffs, inhalation, 4 times daily RT    albuterol 90 mcg/actuation inhaler 2 puffs, inhalation, Every 4 hours PRN    amLODIPine  "(NORVASC) 10 mg, oral, Daily    amoxicillin-clavulanate (Augmentin) 875-125 mg tablet 875 mg, oral, 2 times daily    aspirin 81 mg EC tablet 1 tablet, Daily    atorvastatin (Lipitor) 80 mg tablet TAKE 1 TABLET AT BEDTIME    carvedilol (COREG) 25 mg, oral, 2 times daily    cyclobenzaprine (FLEXERIL) 10 mg, oral, Nightly PRN    diclofenac sodium (Voltaren) 1 % gel USE AS INSTRUCTED BY YOUR PRESCRIBER    docusate sodium 250 mg, oral, Daily    Farxiga 10 mg, oral, Daily before breakfast    fluticasone (Flonase) 50 mcg/actuation nasal spray 2 sprays, Each Nostril, Daily    furosemide (LASIX) 20 mg, oral, Daily, As directed    gabapentin (Neurontin) 600 mg tablet TAKE 2 TABLETS(1200 MG) BY MOUTH THREE TIMES DAILY    MagOx 400 mg, oral, Daily    omeprazole (PRILOSEC) 20 mg, oral, Daily before breakfast      Lab Results   Component Value Date    WBC 6.9 04/09/2025    HGB 12.2 04/09/2025    HCT 37.9 04/09/2025    MCV 89.2 04/09/2025     04/09/2025      Lab Results   Component Value Date    INR 1.0 08/03/2018    INR 1.1 12/13/2017    PROTIME 11.4 08/03/2018    PROTIME 12.4 12/13/2017     No results found for: \"PTT\"  Lab Results   Component Value Date    GLUCOSE 114 (H) 04/09/2025    CALCIUM 9.9 04/09/2025     04/09/2025    K 4.5 04/09/2025    CO2 24 04/09/2025     04/09/2025    BUN 16 04/09/2025    CREATININE 0.77 04/09/2025       === 11/10/17 ===    MRI LUMBAR SPINE WO CONTRAST    - Impression -  1. Artifact from the fusion devices obscure the lateral recesses and  foramina; there questionable left-sided disc protrusions at L2-3 and  L3-4 stenosing the lateral recesses and to lesser degree spinal canal  and foramina; CT myelogram may be helpful for further evaluation.  2. Additional degenerative changes elsewhere as described.  3. Probable right renal cyst.       Assessment/Plan   Jerri aRmirez is a 69 y.o. F who presents for bilateral Lumbar transforaminal epidural steroid injection.     Risks, benefits, " alternatives discussed. All questions answered to the best of my ability. Patient agrees to proceed. Consent signed and patient marked appropriately.    -We will proceed with planned procedure  -Discussed with the patient that once appropriate from a recovery standpoint, they should continue physical therapy exercises at least 2-3 times per week for best long term outcomes  - discussed with the patient that if they take blood thinners, they may resume them in 24hrs        Avinash Agosto MD  Interventional Pain Fellow, PGY-5  [facility]         [1]   Past Medical History:  Diagnosis Date    Abnormal finding of blood chemistry, unspecified 11/06/2019    Abnormal blood chemistry    Acute kidney failure, unspecified     Acute kidney injury    Acute posthemorrhagic anemia     Postoperative anemia due to acute blood loss    Adjustment disorder with depressed mood 11/13/2019    Grief reaction    Atherosclerotic heart disease of native coronary artery without angina pectoris 11/16/2017    Coronary artery disease    Bilateral primary osteoarthritis of knee     Osteoarthritis of both knees    Body mass index (BMI)40.0-44.9, adult 02/12/2021    Body mass index (BMI) of 40.0 to 44.9 in adult    Contusion of left wrist, initial encounter 11/21/2019    Contusion of left wrist, initial encounter    Dermatitis, unspecified 01/12/2018    Dermatitis of external ear    Encounter for follow-up examination after completed treatment for conditions other than malignant neoplasm 07/30/2021    Status post orthopedic surgery, follow-up exam    Encounter for other preprocedural examination 05/20/2021    Pre-operative clearance    Encounter for other screening for malignant neoplasm of breast 10/30/2020    Screening for breast cancer    Encounter for other screening for malignant neoplasm of breast 04/20/2017    Breast cancer screening    History of falling 11/20/2019    H/O fall    History of falling 11/13/2019    H/O  fall    Hypomagnesemia     Hypomagnesemia    ICD (implantable cardioverter-defibrillator) malfunction 04/16/2025    Myalgia, other site 10/26/2017    Myofascial pain    Nondisplaced transverse fracture of left patella, subsequent encounter for closed fracture with nonunion 10/28/2018    Closed nondisplaced transverse fracture of left patella with nonunion, subsequent encounter    Obesity, unspecified 10/31/2017    Obesity (BMI 30.0-34.9)    Other hemorrhoids     Internal hemorrhoid    Other injury of unspecified body region, initial encounter 01/08/2018    Hematoma    Other specified acquired deformities of right lower leg     Acquired genu recurvatum of right knee    Other specified symptoms and signs involving the circulatory and respiratory systems 02/12/2021    Chest congestion    Overweight     Overweight    Pain in unspecified hand 11/20/2019    Hand pain    Pain in unspecified hip 10/26/2017    Hip pain    Pain in unspecified knee 06/02/2016    Knee pain    Personal history of colonic polyps     History of colonic polyps    Personal history of diseases of the blood and blood-forming organs and certain disorders involving the immune mechanism     History of iron deficiency anemia    Personal history of other benign neoplasm 06/22/2016    History of other benign neoplasm    Personal history of other diseases of the circulatory system     History of cardiac disorder    Personal history of other diseases of the circulatory system 10/10/2016    History of hypertension    Personal history of other diseases of the digestive system 11/13/2019    History of acute pancreatitis    Personal history of other diseases of the musculoskeletal system and connective tissue 12/31/2013    History of neck pain    Personal history of other diseases of the musculoskeletal system and connective tissue 10/26/2017    History of low back pain    Personal history of other diseases of the musculoskeletal system and connective tissue  2016    History of temporomandibular joint disorder    Personal history of other diseases of the musculoskeletal system and connective tissue 2021    History of low back pain    Personal history of other endocrine, nutritional and metabolic disease 2019    History of hypokalemia    Personal history of other endocrine, nutritional and metabolic disease     History of dehydration    Personal history of other specified conditions 2019    History of abdominal pain    Personal history of peptic ulcer disease     History of peptic ulcer    Presence of left artificial knee joint 2020    History of left knee replacement    Radiculopathy, lumbar region 2019    Lumbar radicular pain    Reaction to severe stress, unspecified 2019    Situational stress    Spondylolysis, cervical region     Cervical spondylolysis    Unspecified symptoms and signs involving the genitourinary system 2018    Lower urinary tract symptoms (LUTS)    Unspecified systolic (congestive) heart failure 2021    NYHA class 3 heart failure with reduced ejection fraction    Urge incontinence 2016    Sensory urge incontinence   [2]   Past Surgical History:  Procedure Laterality Date    CARDIAC ELECTROPHYSIOLOGY PROCEDURE Left 2025    Procedure: ICD SUBQ Generator Change Out;  Surgeon: Ritchie Navarrete MD;  Location: [facility];  Service: Electrophysiology;  Laterality: Left;  [employer]     SECTION, CLASSIC  2013     Section    CORONARY ANGIOPLASTY WITH STENT PLACEMENT  2017    Cath Stent Placement    CT ANGIO AORTA AND BILATERAL ILIOFEMORAL RUN OFF INCLUDING WITHOUT CONTRAST IF PERFORMED  2017    CT AORTA AND BILATERAL ILIOFEMORAL RUNOFF ANGIOGRAM W AND/OR WO IV CONTRAST 2017 [facility]    FOOT SURGERY  2013    Foot Surgery    GALLBLADDER SURGERY  2013    Gallbladder Surgery    HIP SURGERY  2013    Hip Surgery    KNEE  SURGERY  11/16/2017    Knee Surgery    OTHER SURGICAL HISTORY  09/26/2020    Shoulder replacement   [3]   Family History  Problem Relation Name Age of Onset    Coronary artery disease Sibling      Sudden death Sibling          sudden cardiac death (SCD)    Throat cancer Sibling

## 2025-06-30 NOTE — DISCHARGE INSTRUCTIONS
DISCHARGE INSTRUCTIONS FOR INJECTIONS     You underwent bilateral Lumbar transforaminal epidural steroid injection today    After most injections, it is recommended that you relax and limit your activity for the remainder of the day unless you have been told otherwise by your pain physician.  You should not drive a car, operate machinery, or make important legal decisions unless otherwise directed by your pain physician.  You may resume your normal activity, including exercise, tomorrow.      Keep a written pain diary of how much pain relief you experienced following the injection procedure and the length of time of pain relief you experienced pain relief. Following diagnostic injections like medial branch nerve blocks, sacroiliac joint blocks, stellate ganglion injections and other blocks, it is very important you record the specific amount of pain relief you experienced immediately after the injectionand how long it lasted. Your doctor will ask you for this information at your follow up visit.     For all injections, please keep the injection site dry and inspect the site for a couple of days. You may remove the Band-Aid the day of the injection at any time.     Some discomfort, bruising or slight swelling may occur at the injection site. This is not abnormal if it occurs.  If needed you may:    -Take over the counter medication such as Tylenol or Motrin.   -Apply an ice pack for 30 minutes, 2 to 3 times a day for the first 24 hours.     You may shower today; no soaking baths, hot tubs, whirlpools or swimming pools for two days.      If you are given steroids in your injection, it may take 3-5 days for the steroid medication to take effect. You may notice a worsening of your symptoms for 1-2 days after the injection. This is not abnormal.  You may use acetaminophen, ibuprofen, or prescription medication that your doctor may have prescribed for you if you need to do so.     A few common side effects of steroids  include facial flushing, sweating, restlessness, irritability,difficulty sleeping, increase in blood sugar, and increased blood pressure. If you have diabetes, please monitor your blood sugar at least once a day for at least 5 days. If you have poorly controlled high blood pressure, monitoryour blood pressure for at least 2 days and contact your primary care physician if these numbers are unusually high for you.      If you take aspirin or non-steroidal anti-inflammatory drugs (examples are Motrin, Advil, ibuprofen, Naprosyn, Voltaren, Relafen, etc.) you may restart these this evening, but stop taking it 3 days before your next appointment, unless instructed otherwiseby your physician.      You do not need to discontinue non-aspirin-containing pain medications prior to an injection (examples: Celebrex, tramadol, hydrocodone and acetaminophen).      If you take a blood thinning medication (Coumadin, Lovenox, Fragmin,Ticlid, Plavix, Pradaxa, etc.), please discuss this with your primary care physician/cardiologist and your pain physician. These medications MUST be discontinued before you can have an injection safely, without the risk of uncontrolled bleeding. If these medications are not discontinued for an appropriate period of time, you will not be able to receivean injection. Please adhere to instructions given to you about when to restart your blood thinning medication. If you have any questions please reach out to our team.    If you are taking Coumadin, please have your INR checked the morning of your procedure and bring the result to your appointment unless otherwise instructed. If your INR is over 1.2, your injection may need to be rescheduled to avoid uncontrolled bleeding from the needle placement.     Call [facility]  and ask for Pain Management at [phone] between 8am-4pm Monday - Friday if you are experiencing the following:    If you received an epidural or spinal injection:    -Headache that doesnot  go away with medicine, is worse when sitting or standing up, and is greatly relieved upon lying down.   -Severe pain worse than or different than your baseline pain.   -Chills or fever (101º F or greater).   -Drainage or signs of infection at the injection site     Go directly to the Emergency Department if you are experiencing the following and received an epidural or spinal injection:   -Abrupt weakness or progressive weakness in your legs that starts after you leave the clinic.   -Abrupt severe or worsening numbness in your legs.   -Inability to urinate after the injection or loss of bowel or bladder control without the urge to defecate or urinate.     If you have a clinical question that cannot wait until your next appointment, please call [phone] between 8am-4pm Monday - Friday or send a [employer] message. We do our best to return all non-emergency messages within 24 hours, Monday - Friday. A nurse or physician will return your message. You may also try calling and they will do their best to answer your question(s):  - Dr. Sarkis Tolbert's nurse ([phone])  - Dr. Anai Quintero/Dr. Garcia's nurse ([phone])  - Dr. Nikhil Koroma/Olga's nurse ([phone])     If you need to cancel an appointment, please call the scheduling staff at [phone] during normal business hours or leave a message at least 24 hours in advance.     If you are going to be sedated for your next procedure, you MUST have responsible adult who can legally drive accompany you home. You cannot eat or drink for at least eight hours prior to the planned procedure if you are going to receive sedation. You may take your non-blood thinning medications with a small sip of water.

## 2025-07-02 ENCOUNTER — APPOINTMENT (OUTPATIENT)
Dept: PRIMARY CARE | Facility: CLINIC | Age: 70
End: 2025-07-02
Payer: MEDICARE

## 2025-07-02 VITALS
WEIGHT: 250 LBS | HEART RATE: 73 BPM | OXYGEN SATURATION: 99 % | SYSTOLIC BLOOD PRESSURE: 128 MMHG | TEMPERATURE: 97 F | BODY MASS INDEX: 39.24 KG/M2 | RESPIRATION RATE: 12 BRPM | DIASTOLIC BLOOD PRESSURE: 58 MMHG | HEIGHT: 67 IN

## 2025-07-02 DIAGNOSIS — Z12.31 SCREENING MAMMOGRAM FOR BREAST CANCER: ICD-10-CM

## 2025-07-02 DIAGNOSIS — Z78.0 ASYMPTOMATIC MENOPAUSE: ICD-10-CM

## 2025-07-02 DIAGNOSIS — E66.812 CLASS 2 SEVERE OBESITY DUE TO EXCESS CALORIES WITH SERIOUS COMORBIDITY AND BODY MASS INDEX (BMI) OF 39.0 TO 39.9 IN ADULT: ICD-10-CM

## 2025-07-02 DIAGNOSIS — I50.9 HEART FAILURE, UNSPECIFIED HF CHRONICITY, UNSPECIFIED HEART FAILURE TYPE: ICD-10-CM

## 2025-07-02 DIAGNOSIS — Z13.89 ENCOUNTER FOR SCREENING FOR OTHER DISORDER: ICD-10-CM

## 2025-07-02 DIAGNOSIS — Z12.11 SCREEN FOR COLON CANCER: ICD-10-CM

## 2025-07-02 DIAGNOSIS — Z00.00 ENCOUNTER FOR MEDICARE ANNUAL WELLNESS EXAM: Primary | ICD-10-CM

## 2025-07-02 DIAGNOSIS — E11.9 DIABETES MELLITUS WITHOUT COMPLICATION: ICD-10-CM

## 2025-07-02 DIAGNOSIS — E66.01 CLASS 2 SEVERE OBESITY DUE TO EXCESS CALORIES WITH SERIOUS COMORBIDITY AND BODY MASS INDEX (BMI) OF 39.0 TO 39.9 IN ADULT: ICD-10-CM

## 2025-07-02 DIAGNOSIS — N39.41 URGE INCONTINENCE OF URINE: Primary | ICD-10-CM

## 2025-07-02 DIAGNOSIS — H26.9 CATARACT, UNSPECIFIED CATARACT TYPE, UNSPECIFIED LATERALITY: ICD-10-CM

## 2025-07-02 PROCEDURE — 1036F TOBACCO NON-USER: CPT | Performed by: FAMILY MEDICINE

## 2025-07-02 PROCEDURE — 1159F MED LIST DOCD IN RCRD: CPT | Performed by: FAMILY MEDICINE

## 2025-07-02 PROCEDURE — 1160F RVW MEDS BY RX/DR IN RCRD: CPT | Performed by: FAMILY MEDICINE

## 2025-07-02 PROCEDURE — G0439 PPPS, SUBSEQ VISIT: HCPCS | Performed by: FAMILY MEDICINE

## 2025-07-02 PROCEDURE — G0446 INTENS BEHAVE THER CARDIO DX: HCPCS | Performed by: FAMILY MEDICINE

## 2025-07-02 PROCEDURE — G0444 DEPRESSION SCREEN ANNUAL: HCPCS | Performed by: FAMILY MEDICINE

## 2025-07-02 PROCEDURE — 99214 OFFICE O/P EST MOD 30 MIN: CPT | Performed by: FAMILY MEDICINE

## 2025-07-02 PROCEDURE — 3008F BODY MASS INDEX DOCD: CPT | Performed by: FAMILY MEDICINE

## 2025-07-02 PROCEDURE — 1158F ADVNC CARE PLAN TLK DOCD: CPT | Performed by: FAMILY MEDICINE

## 2025-07-02 PROCEDURE — 1170F FXNL STATUS ASSESSED: CPT | Performed by: FAMILY MEDICINE

## 2025-07-02 PROCEDURE — 3074F SYST BP LT 130 MM HG: CPT | Performed by: FAMILY MEDICINE

## 2025-07-02 PROCEDURE — 99397 PER PM REEVAL EST PAT 65+ YR: CPT | Performed by: FAMILY MEDICINE

## 2025-07-02 PROCEDURE — 3078F DIAST BP <80 MM HG: CPT | Performed by: FAMILY MEDICINE

## 2025-07-02 PROCEDURE — G0447 BEHAVIOR COUNSEL OBESITY 15M: HCPCS | Performed by: FAMILY MEDICINE

## 2025-07-02 RX ORDER — TROSPIUM CHLORIDE ER 60 MG/1
CAPSULE ORAL
COMMUNITY
Start: 2025-06-27

## 2025-07-02 RX ORDER — TROSPIUM CHLORIDE ER 60 MG/1
60 CAPSULE ORAL
Qty: 90 CAPSULE | Refills: 3 | Status: SHIPPED | OUTPATIENT
Start: 2025-07-02 | End: 2025-07-03 | Stop reason: SDUPTHER

## 2025-07-02 RX ORDER — FLUCONAZOLE 100 MG/1
TABLET ORAL
COMMUNITY
Start: 2025-02-02 | End: 2025-07-02 | Stop reason: WASHOUT

## 2025-07-02 ASSESSMENT — PATIENT HEALTH QUESTIONNAIRE - PHQ9
1. LITTLE INTEREST OR PLEASURE IN DOING THINGS: NOT AT ALL
SUM OF ALL RESPONSES TO PHQ9 QUESTIONS 1 AND 2: 0
2. FEELING DOWN, DEPRESSED OR HOPELESS: NOT AT ALL
2. FEELING DOWN, DEPRESSED OR HOPELESS: NOT AT ALL
1. LITTLE INTEREST OR PLEASURE IN DOING THINGS: NOT AT ALL
SUM OF ALL RESPONSES TO PHQ9 QUESTIONS 1 AND 2: 0

## 2025-07-02 ASSESSMENT — ACTIVITIES OF DAILY LIVING (ADL)
TAKING_MEDICATION: INDEPENDENT
MANAGING_FINANCES: INDEPENDENT
GROCERY_SHOPPING: INDEPENDENT
BATHING: INDEPENDENT
DOING_HOUSEWORK: INDEPENDENT
DRESSING: INDEPENDENT

## 2025-07-02 NOTE — PROGRESS NOTES
"Subjective   Reason for Visit: Jerri Ramirez is an 69 y.o. female here for a Medicare Wellness visit.     Past Medical, Surgical, and Family History reviewed and updated in chart.    Reviewed all medications by prescribing practitioner or clinical pharmacist (such as prescriptions, OTCs, herbal therapies and supplements) and documented in the medical record.    HPI    Patient Care Team:  Trina Floyd DO as PCP - General  [facility]  Reginald Lerner MD as PCP - Cardiology (Cardiology)         Health Maintenance:  -   Colonoscopy: 11/15/2022- 3 yr repeat due- ordered  -   Mammogram: 2022- ordered  -   PAP: N/A  -   Bone density DEXA: 4/27/2023- ordered  -   Hep C screening 8/13/2024  Immunizations due: Tdap    Chronic low back pain  Follow-up by pain management  Was with epidural steroid injections.  Getting physical therapy  Using gabapentin    CHF-LVEF 30 to 35%  Followed by cardiology.  Status post subQ ICD  Using Farxiga, Coreg, furosemide    Diabetes  Hemoglobin A1c 6.6  With Farxiga 10 mg  Hx of pancreatitis     HTN  BP at goal today in office.  Using medications without issues.  Denies CP, SOB, palpitations, change in vision, dizziness, N/V.    OA in the hands- Getting OT      Review of Systems  All systems reviewed and neg if not noted in the HPI above     Objective   Vitals:  /80 (Patient Position: Sitting)   Pulse 73   Temp 36.1 °C (97 °F)   Resp 12   Ht 1.702 m (5' 7\")   Wt 113 kg (250 lb)   SpO2 99%   BMI 39.16 kg/m²       Physical Exam  Constitutional:       Appearance: Normal appearance.   HENT:      Head: Normocephalic.      Right Ear: External ear normal.      Left Ear: External ear normal.      Nose: Nose normal.      Mouth/Throat:      Pharynx: Oropharynx is clear.   Eyes:      Extraocular Movements: Extraocular movements intact.   Neck:      Thyroid: No thyroid mass, thyromegaly or thyroid tenderness.      Vascular: No carotid bruit. "   Cardiovascular:      Rate and Rhythm: Normal rate and regular rhythm.      Heart sounds: Normal heart sounds. No murmur heard.  Pulmonary:      Effort: Pulmonary effort is normal. No respiratory distress.      Breath sounds: Normal breath sounds. No wheezing.   Abdominal:      General: Bowel sounds are normal.      Palpations: Abdomen is soft. There is no mass.      Tenderness: There is no abdominal tenderness.   Musculoskeletal:         General: Normal range of motion.      Cervical back: Normal range of motion.      Right lower leg: No edema.      Left lower leg: No edema.   Skin:     General: Skin is warm and dry.      Findings: No rash.   Neurological:      General: No focal deficit present.      Mental Status: She is alert and oriented to person, place, and time.      Motor: No weakness.   Psychiatric:         Mood and Affect: Mood normal.         Behavior: Behavior normal.         Assessment & Plan  Encounter for Medicare annual wellness exam         Encounter for screening for other disorder         Diabetes mellitus without complication    Orders:    CBC and Auto Differential; Future    Comprehensive Metabolic Panel; Future    Albumin-Creatinine Ratio, Urine Random; Future    Hemoglobin A1C; Future    Lipid Panel; Future    TSH with reflex to Free T4 if abnormal; Future    Referral to Ophthalmology; Future    Heart failure, unspecified HF chronicity, unspecified heart failure type    Orders:    CBC and Auto Differential; Future    Comprehensive Metabolic Panel; Future    Lipid Panel; Future    TSH with reflex to Free T4 if abnormal; Future    Screening mammogram for breast cancer    Orders:    BI mammo bilateral screening tomosynthesis; Future    Screen for colon cancer    Orders:    Colonoscopy Screening; High Risk Patient; Future    Asymptomatic menopause    Orders:    XR DEXA bone density; Future    Cataract, unspecified cataract type, unspecified laterality    Orders:    Referral to Ophthalmology;  Future    Class 2 severe obesity due to excess calories with serious comorbidity and body mass index (BMI) of 39.0 to 39.9 in adult                Cardiac Risk Assessment  15 - 20 minutes were spent discussing Cardiovascular risk and, if needed, lifestyle modifications were recommended, including nutritional choices, exercise, and elimination of habits contributing to risk.   Aspirin use/disuse was discussed following the guidelines below:  low dose ASA ( mg) should be considered:    If prior Heart Attack/Stroke/Peripheral vascular disease:  Generally recommend daily low dose aspirin unless extremely high bleeding risk (e.g., gastrointestinal).    If no prior Heart Attack/Stroke/Peripheral vascular disease:              Age over 70: Do not use Aspirin for prevention    Age less than 70 and 10-year cardiovascular disease risk is >20%: use low dose Aspirin for prevention.                 Depression Screening  5 - 10 minutes were spent screening for depression.    Obesity Counseling  15 - 20 minutes were spent counseling on diet and exercise interventions to address obesity and weight reduction.      Please follow up in   - 6months for got HTN/DM  - 1 yr for medicare wellness  - or as needed.

## 2025-07-02 NOTE — PATIENT INSTRUCTIONS
Health Maintenance:  -   Colonoscopy: 11/15/2022- 3 yr repeat due- ordered  -   Mammogram: 2022- ordered  -   PAP: N/A  -   Bone density DEXA: 4/27/2023- ordered  -   Hep C screening 8/13/2024  Immunizations due: Tdap- talk with your pharm      Dm  - Labs  - Referral for eye exam    Hand pain  - To call Dr. Galeana        Please follow up in   - 6months for got HTN/DM  - 1 yr for medicare wellness  - or as needed.       ** If labs or imaging ordered at today's visit, all the non-urgent results will be discussed at your next visit    If you have been referred for a special test or to a specialist please call  [phone] to schedule an appointment.  If you have any further questions, or if develop new or worsened symptoms, please give our office a call at [phone].

## 2025-07-02 NOTE — ASSESSMENT & PLAN NOTE
Orders:    CBC and Auto Differential; Future    Comprehensive Metabolic Panel; Future    Albumin-Creatinine Ratio, Urine Random; Future    Hemoglobin A1C; Future    Lipid Panel; Future    TSH with reflex to Free T4 if abnormal; Future    Referral to Ophthalmology; Future

## 2025-07-03 ENCOUNTER — TELEMEDICINE (OUTPATIENT)
Dept: UROLOGY | Facility: CLINIC | Age: 70
End: 2025-07-03
Payer: MEDICARE

## 2025-07-03 DIAGNOSIS — N39.41 URGE INCONTINENCE OF URINE: ICD-10-CM

## 2025-07-03 LAB
ALBUMIN SERPL-MCNC: 4.4 G/DL (ref 3.6–5.1)
ALP SERPL-CCNC: 97 U/L (ref 37–153)
ALT SERPL-CCNC: 13 U/L (ref 6–29)
ANION GAP SERPL CALCULATED.4IONS-SCNC: 8 MMOL/L (CALC) (ref 7–17)
AST SERPL-CCNC: 14 U/L (ref 10–35)
BASOPHILS # BLD AUTO: 36 CELLS/UL (ref 0–200)
BASOPHILS NFR BLD AUTO: 0.4 %
BILIRUB SERPL-MCNC: 0.4 MG/DL (ref 0.2–1.2)
BUN SERPL-MCNC: 16 MG/DL (ref 7–25)
CALCIUM SERPL-MCNC: 9.8 MG/DL (ref 8.6–10.4)
CHLORIDE SERPL-SCNC: 103 MMOL/L (ref 98–110)
CHOLEST SERPL-MCNC: 171 MG/DL
CHOLEST/HDLC SERPL: 2.9 (CALC)
CO2 SERPL-SCNC: 28 MMOL/L (ref 20–32)
CREAT SERPL-MCNC: 0.77 MG/DL (ref 0.6–1)
EGFRCR SERPLBLD CKD-EPI 2021: 83 ML/MIN/1.73M2
EOSINOPHIL # BLD AUTO: 89 CELLS/UL (ref 15–500)
EOSINOPHIL NFR BLD AUTO: 1 %
ERYTHROCYTE [DISTWIDTH] IN BLOOD BY AUTOMATED COUNT: 13.3 % (ref 11–15)
EST. AVERAGE GLUCOSE BLD GHB EST-MCNC: 151 MG/DL
EST. AVERAGE GLUCOSE BLD GHB EST-SCNC: 8.4 MMOL/L
GLUCOSE SERPL-MCNC: 112 MG/DL (ref 65–99)
HBA1C MFR BLD: 6.9 %
HCT VFR BLD AUTO: 38.7 % (ref 35–45)
HDLC SERPL-MCNC: 58 MG/DL
HGB BLD-MCNC: 12.1 G/DL (ref 11.7–15.5)
LDLC SERPL CALC-MCNC: 91 MG/DL (CALC)
LYMPHOCYTES # BLD AUTO: 3587 CELLS/UL (ref 850–3900)
LYMPHOCYTES NFR BLD AUTO: 40.3 %
MCH RBC QN AUTO: 28.3 PG (ref 27–33)
MCHC RBC AUTO-ENTMCNC: 31.3 G/DL (ref 32–36)
MCV RBC AUTO: 90.4 FL (ref 80–100)
MONOCYTES # BLD AUTO: 392 CELLS/UL (ref 200–950)
MONOCYTES NFR BLD AUTO: 4.4 %
NEUTROPHILS # BLD AUTO: 4797 CELLS/UL (ref 1500–7800)
NEUTROPHILS NFR BLD AUTO: 53.9 %
NONHDLC SERPL-MCNC: 113 MG/DL (CALC)
PLATELET # BLD AUTO: 346 THOUSAND/UL (ref 140–400)
PMV BLD REES-ECKER: 9.1 FL (ref 7.5–12.5)
POTASSIUM SERPL-SCNC: 4.3 MMOL/L (ref 3.5–5.3)
PROT SERPL-MCNC: 7.1 G/DL (ref 6.1–8.1)
RBC # BLD AUTO: 4.28 MILLION/UL (ref 3.8–5.1)
SODIUM SERPL-SCNC: 139 MMOL/L (ref 135–146)
TRIGL SERPL-MCNC: 119 MG/DL
TSH SERPL-ACNC: 1.64 MIU/L (ref 0.4–4.5)
WBC # BLD AUTO: 8.9 THOUSAND/UL (ref 3.8–10.8)

## 2025-07-03 PROCEDURE — 1036F TOBACCO NON-USER: CPT | Performed by: PHYSICIAN ASSISTANT

## 2025-07-03 PROCEDURE — 99213 OFFICE O/P EST LOW 20 MIN: CPT | Performed by: PHYSICIAN ASSISTANT

## 2025-07-03 RX ORDER — TROSPIUM CHLORIDE ER 60 MG/1
60 CAPSULE ORAL
Qty: 30 CAPSULE | Refills: 1 | Status: SHIPPED | OUTPATIENT
Start: 2025-07-03 | End: 2026-07-03

## 2025-07-03 ASSESSMENT — ENCOUNTER SYMPTOMS
CARDIOVASCULAR NEGATIVE: 1
MUSCULOSKELETAL NEGATIVE: 1
RESPIRATORY NEGATIVE: 1
ENDOCRINE NEGATIVE: 1
CONSTITUTIONAL NEGATIVE: 1
HEMATOLOGIC/LYMPHATIC NEGATIVE: 1
EYES NEGATIVE: 1
GASTROINTESTINAL NEGATIVE: 1
NEUROLOGICAL NEGATIVE: 1
PSYCHIATRIC NEGATIVE: 1
ALLERGIC/IMMUNOLOGIC NEGATIVE: 1

## 2025-07-03 NOTE — PROGRESS NOTES
Subjective   Patient ID: Jerri Ramirez is a 69 y.o. female who presents for No chief complaint on file..  HPI  Patient is a 70 yo female with OAB on trospium 60 mg seen for a follow up     Patient is doing well symptomatically.  She reports some urinary frequency and urgency which started after starting Jardiance. She denies incontinence. She denies hematuria or dysuria     She is is satisfied with trospium.   Review of Systems   Constitutional: Negative.    HENT: Negative.     Eyes: Negative.    Respiratory: Negative.     Cardiovascular: Negative.    Gastrointestinal: Negative.    Endocrine: Negative.    Genitourinary: Negative.    Musculoskeletal: Negative.    Skin: Negative.    Allergic/Immunologic: Negative.    Neurological: Negative.    Hematological: Negative.    Psychiatric/Behavioral: Negative.         Objective   Physical Exam  Virtual visit  Assessment/Plan   Problem List Items Addressed This Visit           ICD-10-CM    Urge incontinence of urine N39.41    Relevant Medications    trospium (Sanctura XR) 60 mg 24 hour capsule     Refill sent to pharmacy.   I discussed third line option if urinary symptoms continue to be bothersome.     Follow up in 1 year or sooner as needed        Bety Fischer PA-C 07/03/25 3:36 PM

## 2025-07-08 ENCOUNTER — CLINICAL SUPPORT (OUTPATIENT)
Dept: NUTRITION | Facility: CLINIC | Age: 70
End: 2025-07-08
Payer: MEDICARE

## 2025-07-08 NOTE — PROGRESS NOTES
[employer]  Diet Recommendation 1: Heart Healthy  Diet Recommendation 2: Sodium, Low  Diet Recommendation 3: Diabetes  Food Intolerance Avoidance: NKFA  Household Size: 2 Family Members  Interventions: Referral Number: 2nd 6 Mo Referral 1 yr (Referrals may not be consecutive)  Interventions: Visit Number: 5 of 6 Visits - Max 6 Visits/Referral Each 6 Mo Period  Education Today: Healthy Recipes  Recipes Today: -  Grains: 0-25% Whole  Fruit: 25-50% Fresh  Vegetables: % Fresh  Proteins: 1-2 Plant-based Items  Dairy: 0-25% Lowfat  Relevant [employer] Inpatient Discharge Items: -  Originating Site of Referral Order: Trina Floyd DO  Initials of RD Assisting Today: DON

## 2025-07-14 PROCEDURE — RXMED WILLOW AMBULATORY MEDICATION CHARGE

## 2025-07-17 ENCOUNTER — PHARMACY VISIT (OUTPATIENT)
Dept: PHARMACY | Facility: CLINIC | Age: 70
End: 2025-07-17
Payer: COMMERCIAL

## 2025-07-17 DIAGNOSIS — I50.9 HEART FAILURE, UNSPECIFIED HF CHRONICITY, UNSPECIFIED HEART FAILURE TYPE: ICD-10-CM

## 2025-07-17 DIAGNOSIS — R93.1 DECREASED CARDIAC EJECTION FRACTION: ICD-10-CM

## 2025-07-17 DIAGNOSIS — Z95.810 ICD (IMPLANTABLE CARDIOVERTER-DEFIBRILLATOR) IN PLACE: ICD-10-CM

## 2025-07-17 DIAGNOSIS — E83.42 HYPOMAGNESEMIA: ICD-10-CM

## 2025-07-17 DIAGNOSIS — K59.00 CONSTIPATION, UNSPECIFIED CONSTIPATION TYPE: ICD-10-CM

## 2025-07-18 ENCOUNTER — OFFICE VISIT (OUTPATIENT)
Dept: ORTHOPEDIC SURGERY | Facility: HOSPITAL | Age: 70
End: 2025-07-18
Payer: MEDICARE

## 2025-07-18 DIAGNOSIS — M65.331 TRIGGER FINGER, RIGHT MIDDLE FINGER: ICD-10-CM

## 2025-07-18 DIAGNOSIS — M17.12 PRIMARY OSTEOARTHRITIS OF LEFT KNEE: ICD-10-CM

## 2025-07-18 PROCEDURE — 1160F RVW MEDS BY RX/DR IN RCRD: CPT | Performed by: ORTHOPAEDIC SURGERY

## 2025-07-18 PROCEDURE — 99212 OFFICE O/P EST SF 10 MIN: CPT | Performed by: ORTHOPAEDIC SURGERY

## 2025-07-18 PROCEDURE — 20550 NJX 1 TENDON SHEATH/LIGAMENT: CPT | Mod: RT | Performed by: ORTHOPAEDIC SURGERY

## 2025-07-18 PROCEDURE — 1159F MED LIST DOCD IN RCRD: CPT | Performed by: ORTHOPAEDIC SURGERY

## 2025-07-18 PROCEDURE — 99213 OFFICE O/P EST LOW 20 MIN: CPT | Performed by: ORTHOPAEDIC SURGERY

## 2025-07-18 PROCEDURE — 2500000004 HC RX 250 GENERAL PHARMACY W/ HCPCS (ALT 636 FOR OP/ED): Performed by: ORTHOPAEDIC SURGERY

## 2025-07-18 RX ORDER — DICLOFENAC SODIUM 10 MG/G
4 GEL TOPICAL 4 TIMES DAILY PRN
Qty: 4 G | Refills: 0 | Status: SHIPPED | OUTPATIENT
Start: 2025-07-18

## 2025-07-18 RX ORDER — CARVEDILOL 25 MG/1
25 TABLET ORAL 2 TIMES DAILY
Qty: 180 TABLET | Refills: 3 | Status: SHIPPED | OUTPATIENT
Start: 2025-07-18

## 2025-07-18 RX ORDER — MAGNESIUM OXIDE 400 MG
1 TABLET ORAL DAILY
Qty: 90 TABLET | Refills: 3 | Status: SHIPPED | OUTPATIENT
Start: 2025-07-18

## 2025-07-18 RX ADMIN — LIDOCAINE HYDROCHLORIDE 0.5 ML: 10 INJECTION, SOLUTION INFILTRATION; PERINEURAL at 17:20

## 2025-07-18 RX ADMIN — TRIAMCINOLONE ACETONIDE 20 MG: 40 INJECTION, SUSPENSION INTRA-ARTICULAR; INTRAMUSCULAR at 17:20

## 2025-07-18 ASSESSMENT — PAIN - FUNCTIONAL ASSESSMENT: PAIN_FUNCTIONAL_ASSESSMENT: NO/DENIES PAIN

## 2025-07-18 NOTE — Clinical Note
July 18, 2025     Trina Floyd DO  [address]  [phone]  [facility] [address]    Patient: Jerri Ramirez   YOB: 1955   Date of Visit: 7/18/2025       Dear Dr. Trina Floyd DO:    Thank you for referring Jerri Ramirez to me for evaluation. Below are my notes for this consultation.  If you have questions, please do not hesitate to call me. I look forward to following your patient along with you.       Sincerely,     Eran Galeana MD      CC: No Recipients  ______________________________________________________________________________________

## 2025-07-18 NOTE — LETTER
have not required use of the opposite hand for extension.  No interval trauma.  It does not wake her from sleep.  No similar problems in other digits.  No intervention so far.      PHYSICAL EXAM       She remains well-developed, well-nourished obese female in no acute distress.  She appears her stated age and has a pleasant affect.  Skin of the right hand and wrist is intact.  Incisions are well-healed and nontender.  No erythema, ecchymosis, or diffuse swelling.  Palpable flexor nodule and A1 tenderness of the right long finger.  Obvious spontaneous triggering.  Other digits have full, smooth motion.  No further discomfort with Finkelstein maneuver.  Sensation subjectively little decreased in the median distribution but clearly intact by blinded confrontation light touch.  Negative Tinel, Phalen, Durkan.  Cervical range of motion is good and does not cause discomfort in the hand.      IMAGING / LABS / EMGs    None today      PROCEDURE    Hand / UE Inj/Asp: R long A1 for trigger finger on 7/18/2025 5:20 PM  Indications: therapeutic  Details: 25 G needle, volar approach  Medications: 20 mg triamcinolone acetonide 40 mg/mL; 0.5 mL lidocaine 10 mg/mL (1 %)  Outcome: tolerated well, no immediate complications  Procedure, treatment alternatives, risks and benefits explained, specific risks discussed. Consent was given by the patient.             Electronically Signed      MIRELA Galeana MD      Orthopaedic Hand Surgery      [phone]

## 2025-07-18 NOTE — PROGRESS NOTES
CHIEF COMPLAINT         Right hand pain    ASSESSMENT + PLAN     ***        HISTORY OF PRESENT ILLNESS       Patient returns today, ***      PHYSICAL EXAM       ***      IMAGING / LABS / EMGs      ***      Electronically Signed      MIRELA Galeana MD      Orthopaedic Hand Surgery      [phone]   digits have full, smooth motion.  No further discomfort with Finkelstein maneuver.  Sensation subjectively little decreased in the median distribution but clearly intact by blinded confrontation light touch.  Negative Tinel, Phalen, Durkan.  Cervical range of motion is good and does not cause discomfort in the hand.      IMAGING / LABS / EMGs    None today      PROCEDURE    Hand / UE Inj/Asp: R long A1 for trigger finger on 7/18/2025 5:20 PM  Indications: therapeutic  Details: 25 G needle, volar approach  Medications: 20 mg triamcinolone acetonide 40 mg/mL; 0.5 mL lidocaine 10 mg/mL (1 %)  Outcome: tolerated well, no immediate complications  Procedure, treatment alternatives, risks and benefits explained, specific risks discussed. Consent was given by the patient.             Electronically Signed      MIRELA Galeana MD      Orthopaedic Hand Surgery      [phone]

## 2025-07-22 ENCOUNTER — APPOINTMENT (OUTPATIENT)
Dept: RADIOLOGY | Facility: CLINIC | Age: 70
End: 2025-07-22
Payer: MEDICARE

## 2025-08-04 RX ORDER — TRIAMCINOLONE ACETONIDE 40 MG/ML
20 INJECTION, SUSPENSION INTRA-ARTICULAR; INTRAMUSCULAR
Status: COMPLETED | OUTPATIENT
Start: 2025-07-18 | End: 2025-07-18

## 2025-08-04 RX ORDER — LIDOCAINE HYDROCHLORIDE 10 MG/ML
0.5 INJECTION, SOLUTION INFILTRATION; PERINEURAL
Status: COMPLETED | OUTPATIENT
Start: 2025-07-18 | End: 2025-07-18

## 2025-08-07 ENCOUNTER — CLINICAL SUPPORT (OUTPATIENT)
Dept: NUTRITION | Facility: CLINIC | Age: 70
End: 2025-08-07
Payer: MEDICARE

## 2025-08-07 ENCOUNTER — APPOINTMENT (OUTPATIENT)
Dept: OPHTHALMOLOGY | Facility: CLINIC | Age: 70
End: 2025-08-07
Payer: MEDICARE

## 2025-08-07 DIAGNOSIS — H25.813 COMBINED FORM OF AGE-RELATED CATARACT, BOTH EYES: Primary | ICD-10-CM

## 2025-08-07 DIAGNOSIS — H52.13 MYOPIC ASTIGMATISM OF BOTH EYES: ICD-10-CM

## 2025-08-07 DIAGNOSIS — H04.129 DRY EYE: ICD-10-CM

## 2025-08-07 DIAGNOSIS — H52.203 MYOPIC ASTIGMATISM OF BOTH EYES: ICD-10-CM

## 2025-08-07 PROCEDURE — 99203 OFFICE O/P NEW LOW 30 MIN: CPT | Performed by: OPHTHALMOLOGY

## 2025-08-07 ASSESSMENT — REFRACTION_WEARINGRX
OD_CYLINDER: -0.25
OD_AXIS: 120
OD_SPHERE: -1.75
OS_CYLINDER: -0.50
OS_SPHERE: -1.50
OD_ADD: +3.00
OS_ADD: +3.00
OS_AXIS: 002

## 2025-08-07 ASSESSMENT — VISUAL ACUITY
OS_BAT_MED: 20/30
METHOD: SNELLEN - LINEAR
OS_CC+: +2
OS_CC: 20/25
OD_CC+: -2
OD_CC: 20/20
OD_BAT_MED: 20/30

## 2025-08-07 ASSESSMENT — ENCOUNTER SYMPTOMS
HEMATOLOGIC/LYMPHATIC NEGATIVE: 0
CARDIOVASCULAR NEGATIVE: 0
NEUROLOGICAL NEGATIVE: 0
GASTROINTESTINAL NEGATIVE: 0
PSYCHIATRIC NEGATIVE: 0
ALLERGIC/IMMUNOLOGIC NEGATIVE: 0
ENDOCRINE NEGATIVE: 0
EYES NEGATIVE: 0
CONSTITUTIONAL NEGATIVE: 0
MUSCULOSKELETAL NEGATIVE: 0
RESPIRATORY NEGATIVE: 0

## 2025-08-07 ASSESSMENT — TONOMETRY
IOP_METHOD: GOLDMANN APPLANATION
OS_IOP_MMHG: 18
OD_IOP_MMHG: 19

## 2025-08-07 ASSESSMENT — REFRACTION_MANIFEST
OD_ADD: +2.75
OS_SPHERE: -1.50
OS_ADD: +2.75
OS_CYLINDER: -0.50
OD_SPHERE: -1.50
OD_CYLINDER: SPHERE
OS_AXIS: 165

## 2025-08-07 ASSESSMENT — CUP TO DISC RATIO
OS_RATIO: 0.3
OD_RATIO: 0.3

## 2025-08-07 ASSESSMENT — SLIT LAMP EXAM - LIDS
COMMENTS: NORMAL
COMMENTS: NORMAL

## 2025-08-07 ASSESSMENT — EXTERNAL EXAM - RIGHT EYE: OD_EXAM: NORMAL

## 2025-08-07 ASSESSMENT — EXTERNAL EXAM - LEFT EYE: OS_EXAM: NORMAL

## 2025-08-07 NOTE — PROGRESS NOTES
[employer] [facility]  Diet Recommendation 1: Heart Healthy  Diet Recommendation 2: Sodium, Low  Diet Recommendation 3: Diabetes  Food Intolerance Avoidance: NKFA  Household Size: 2 Family Members  Interventions: Referral Number: 2nd 6 Mo Referral 1 yr (Referrals may not be consecutive)  Interventions: Visit Number: 6 of 6 Visits - Max 6 Visits/Referral Each 6 Mo Period  Education Today: Healthy Recipes  Follow Up Notes for Future Visits: kalyani dr for new referral  Recipes Today: -  Grains: 0-25% Whole  Fruit: % Fresh  Vegetables: % Fresh  Proteins: 0 Plant-based Items  Dairy: 0-25% Lowfat  Relevant [facility] Inpatient Discharge Items: -  Originating Site of Referral Order: Trina Floyd DO  Initials of RD Assisting Today: DON

## 2025-08-07 NOTE — PROGRESS NOTES
Assessment/Plan   Diagnoses and all orders for this visit:  Combined form of age-related cataract, both eyes  Cataract is still mild, corrects to 20/20    Myopic astigmatism of both eyes  New Glasses RX given.    Dry eye  Use Ats BID (sample given)    See in 9 months.

## 2025-08-08 ENCOUNTER — TELEPHONE (OUTPATIENT)
Dept: PRIMARY CARE | Facility: CLINIC | Age: 70
End: 2025-08-08
Payer: MEDICARE

## 2025-08-08 DIAGNOSIS — Z13.9 ENCOUNTER FOR SCREENING INVOLVING SOCIAL DETERMINANTS OF HEALTH (SDOH): Primary | ICD-10-CM

## 2025-08-08 NOTE — TELEPHONE ENCOUNTER
----- Message from Carrie PLATT sent at 2025 10:19 AM EDT -----  Regarding: [employer] [facility] [employer] referral  [facility]!  Would it be possible for this patient to get a new referral to visit the [employer] [facility] at [facility]?  Their last referral has .    Kind regards,  Carrie Art

## 2025-08-13 PROCEDURE — RXMED WILLOW AMBULATORY MEDICATION CHARGE

## 2025-08-15 ENCOUNTER — PHARMACY VISIT (OUTPATIENT)
Dept: PHARMACY | Facility: CLINIC | Age: 70
End: 2025-08-15
Payer: COMMERCIAL

## 2025-09-04 ENCOUNTER — CLINICAL SUPPORT (OUTPATIENT)
Dept: NUTRITION | Facility: CLINIC | Age: 70
End: 2025-09-04
Payer: MEDICARE

## 2025-09-08 ENCOUNTER — APPOINTMENT (OUTPATIENT)
Dept: PHARMACY | Facility: HOSPITAL | Age: 70
End: 2025-09-08
Payer: MEDICARE

## 2026-01-06 ENCOUNTER — APPOINTMENT (OUTPATIENT)
Dept: PRIMARY CARE | Facility: CLINIC | Age: 71
End: 2026-01-06
Payer: MEDICARE

## 2026-05-07 ENCOUNTER — APPOINTMENT (OUTPATIENT)
Dept: OPHTHALMOLOGY | Facility: CLINIC | Age: 71
End: 2026-05-07
Payer: MEDICARE

## 2026-07-07 ENCOUNTER — APPOINTMENT (OUTPATIENT)
Dept: PRIMARY CARE | Facility: CLINIC | Age: 71
End: 2026-07-07
Payer: MEDICARE

## (undated) DEVICE — ENVELOPE, ANTIBACTERIAL, AIGIS RX TYRX, ABSORBABLE, LRG